# Patient Record
Sex: FEMALE | Race: WHITE | NOT HISPANIC OR LATINO | Employment: FULL TIME | URBAN - METROPOLITAN AREA
[De-identification: names, ages, dates, MRNs, and addresses within clinical notes are randomized per-mention and may not be internally consistent; named-entity substitution may affect disease eponyms.]

---

## 2017-02-03 ENCOUNTER — HOSPITAL ENCOUNTER (OUTPATIENT)
Dept: MAMMOGRAPHY | Facility: HOSPITAL | Age: 62
Discharge: HOME | End: 2017-02-03

## 2017-02-13 ENCOUNTER — HOSPITAL ENCOUNTER (OUTPATIENT)
Dept: MAMMOGRAPHY | Facility: HOSPITAL | Age: 62
Discharge: HOME | End: 2017-02-13
Attending: GENERAL PRACTICE | Admitting: GENERAL PRACTICE

## 2018-01-19 ENCOUNTER — HOSPITAL ENCOUNTER (OUTPATIENT)
Dept: MAMMOGRAPHY | Facility: HOSPITAL | Age: 63
Discharge: HOME | End: 2018-01-19

## 2018-02-09 ENCOUNTER — HOSPITAL ENCOUNTER (OUTPATIENT)
Dept: MAMMOGRAPHY | Facility: HOSPITAL | Age: 63
Discharge: HOME | End: 2018-02-09
Attending: GENERAL PRACTICE | Admitting: GENERAL PRACTICE

## 2018-04-18 ENCOUNTER — HOSPITAL ENCOUNTER (OUTPATIENT)
Dept: URGENT CARE | Facility: CLINIC | Age: 63
Discharge: HOME | End: 2018-04-18
Attending: FAMILY MEDICINE | Admitting: FAMILY MEDICINE

## 2018-08-24 ENCOUNTER — HOSPITAL ENCOUNTER (OUTPATIENT)
Dept: OTHER | Facility: HOSPITAL | Age: 63
Discharge: HOME | End: 2018-08-24
Attending: ORTHOPAEDIC SURGERY | Admitting: ORTHOPAEDIC SURGERY

## 2018-08-24 LAB
ANION GAP SERPL CALC-SCNC: 10.5 MMOL/L (ref 10–20)
BASOPHILS # BLD AUTO: 0 10*3/UL (ref 0–0.2)
BASOPHILS NFR BLD AUTO: 1 % (ref 0–2)
BUN SERPL-MCNC: 14 MG/DL (ref 8–20)
BUN/CREAT SERPL: 20 (ref 5.4–26.2)
CALCIUM SERPL-MCNC: 9.5 MG/DL (ref 8.9–10.3)
CHLORIDE SERPL-SCNC: 100 MMOL/L (ref 101–111)
CONV CO2: 29 MMOL/L (ref 22–32)
CREAT UR-MCNC: 0.7 MG/DL (ref 0.4–1)
DIFFERENTIAL METHOD BLD: (no result)
EOSINOPHIL # BLD AUTO: 0.2 10*3/UL (ref 0–0.3)
EOSINOPHIL # BLD AUTO: 2 % (ref 0–3)
ERYTHROCYTE [DISTWIDTH] IN BLOOD BY AUTOMATED COUNT: 15.2 % (ref 11.5–14.5)
GLUCOSE SERPL-MCNC: 87 MG/DL (ref 65–99)
HCT VFR BLD AUTO: 37.3 % (ref 35–49)
HGB BLD-MCNC: 12.6 G/DL (ref 12–15)
LYMPHOCYTES # BLD AUTO: 3.2 10*3/UL (ref 0.8–4.8)
LYMPHOCYTES NFR BLD AUTO: 31 % (ref 18–42)
MCH RBC QN AUTO: 29.4 PG (ref 26–32)
MCHC RBC AUTO-ENTMCNC: 33.7 G/DL (ref 32–36)
MCV RBC AUTO: 87.1 FL (ref 80–94)
MONOCYTES # BLD AUTO: 0.7 10*3/UL (ref 0.1–1.3)
MONOCYTES NFR BLD AUTO: 7 % (ref 2–11)
NEUTROPHILS # BLD AUTO: 6.1 10*3/UL (ref 2.3–8.6)
NEUTROPHILS NFR BLD AUTO: 59 % (ref 50–75)
NRBC BLD AUTO-RTO: 0 /100{WBCS}
NRBC/RBC NFR BLD MANUAL: 0 10*3/UL
PLATELET # BLD AUTO: 355 10*3/UL (ref 150–450)
PMV BLD AUTO: 7.4 FL (ref 7.4–10.4)
POTASSIUM SERPL-SCNC: 3.5 MMOL/L (ref 3.6–5.1)
RBC # BLD AUTO: 4.29 10*6/UL (ref 4–5.4)
SODIUM SERPL-SCNC: 136 MMOL/L (ref 136–144)
WBC # BLD AUTO: 10.2 10*3/UL (ref 4.5–11.5)

## 2019-01-25 ENCOUNTER — HOSPITAL ENCOUNTER (OUTPATIENT)
Dept: MAMMOGRAPHY | Facility: HOSPITAL | Age: 64
Discharge: HOME | End: 2019-01-25

## 2019-02-06 ENCOUNTER — HOSPITAL ENCOUNTER (OUTPATIENT)
Dept: MAMMOGRAPHY | Facility: HOSPITAL | Age: 64
Discharge: HOME | End: 2019-02-06
Attending: GENERAL PRACTICE | Admitting: GENERAL PRACTICE

## 2019-02-22 ENCOUNTER — HOSPITAL ENCOUNTER (OUTPATIENT)
Dept: GENERAL RADIOLOGY | Facility: HOSPITAL | Age: 64
Discharge: HOME | End: 2019-02-22
Attending: GENERAL PRACTICE | Admitting: GENERAL PRACTICE

## 2019-07-10 ENCOUNTER — ON CAMPUS - OUTPATIENT (OUTPATIENT)
Dept: RURAL HOSPITAL 3 | Facility: HOSPITAL | Age: 64
End: 2019-07-10
Payer: COMMERCIAL

## 2019-07-10 DIAGNOSIS — K52.9 NONINFECTIVE GASTROENTERITIS AND COLITIS, UNSPECIFIED: ICD-10-CM

## 2019-07-10 DIAGNOSIS — Z86.010 PERSONAL HISTORY OF COLONIC POLYPS: ICD-10-CM

## 2019-07-10 DIAGNOSIS — K64.8 OTHER HEMORRHOIDS: ICD-10-CM

## 2019-07-10 PROCEDURE — 45380 COLONOSCOPY AND BIOPSY: CPT | Performed by: INTERNAL MEDICINE

## 2020-02-21 ENCOUNTER — TRANSCRIBE ORDERS (OUTPATIENT)
Dept: ADMINISTRATIVE | Facility: HOSPITAL | Age: 65
End: 2020-02-21

## 2020-02-21 DIAGNOSIS — Z12.31 SCREENING MAMMOGRAM, ENCOUNTER FOR: Primary | ICD-10-CM

## 2020-03-02 ENCOUNTER — HOSPITAL ENCOUNTER (OUTPATIENT)
Facility: HOSPITAL | Age: 65
Discharge: HOME | End: 2020-03-05
Attending: EMERGENCY MEDICINE | Admitting: INTERNAL MEDICINE

## 2020-03-02 ENCOUNTER — APPOINTMENT (OUTPATIENT)
Dept: GENERAL RADIOLOGY | Facility: HOSPITAL | Age: 65
End: 2020-03-02

## 2020-03-02 DIAGNOSIS — E78.2 MIXED HYPERLIPIDEMIA: ICD-10-CM

## 2020-03-02 DIAGNOSIS — I10 ESSENTIAL HYPERTENSION: ICD-10-CM

## 2020-03-02 DIAGNOSIS — R94.39 ABNORMAL NUCLEAR STRESS TEST: Primary | ICD-10-CM

## 2020-03-02 DIAGNOSIS — R07.9 CHEST PAIN, UNSPECIFIED TYPE: ICD-10-CM

## 2020-03-02 PROBLEM — E78.5 HYPERLIPIDEMIA: Status: ACTIVE | Noted: 2020-03-02

## 2020-03-02 PROBLEM — E66.9 OBESITY (BMI 30-39.9): Chronic | Status: ACTIVE | Noted: 2020-03-02

## 2020-03-02 PROBLEM — M85.80 OSTEOPENIA: Chronic | Status: ACTIVE | Noted: 2020-03-02

## 2020-03-02 PROBLEM — E78.5 HYPERLIPIDEMIA: Chronic | Status: ACTIVE | Noted: 2020-03-02

## 2020-03-02 LAB
ANION GAP SERPL CALCULATED.3IONS-SCNC: 16 MMOL/L (ref 5–15)
APTT PPP: 25.2 SECONDS (ref 24–31)
BASOPHILS # BLD AUTO: 0.1 10*3/MM3 (ref 0–0.2)
BASOPHILS NFR BLD AUTO: 1 % (ref 0–1.5)
BUN BLD-MCNC: 19 MG/DL (ref 8–23)
BUN/CREAT SERPL: 26.8 (ref 7–25)
CALCIUM SPEC-SCNC: 10.4 MG/DL (ref 8.6–10.5)
CHLORIDE SERPL-SCNC: 99 MMOL/L (ref 98–107)
CO2 SERPL-SCNC: 25 MMOL/L (ref 22–29)
CREAT BLD-MCNC: 0.71 MG/DL (ref 0.57–1)
D DIMER PPP FEU-MCNC: 0.79 MCGFEU/ML (ref 0.17–0.59)
DEPRECATED RDW RBC AUTO: 46.8 FL (ref 37–54)
EOSINOPHIL # BLD AUTO: 0.2 10*3/MM3 (ref 0–0.4)
EOSINOPHIL NFR BLD AUTO: 2.1 % (ref 0.3–6.2)
ERYTHROCYTE [DISTWIDTH] IN BLOOD BY AUTOMATED COUNT: 15 % (ref 12.3–15.4)
GFR SERPL CREATININE-BSD FRML MDRD: 83 ML/MIN/1.73
GLUCOSE BLD-MCNC: 99 MG/DL (ref 65–99)
HCT VFR BLD AUTO: 38.4 % (ref 34–46.6)
HGB BLD-MCNC: 12.9 G/DL (ref 12–15.9)
INR PPP: 1.03 (ref 0.9–1.1)
LYMPHOCYTES # BLD AUTO: 4.4 10*3/MM3 (ref 0.7–3.1)
LYMPHOCYTES NFR BLD AUTO: 39.8 % (ref 19.6–45.3)
MCH RBC QN AUTO: 29.5 PG (ref 26.6–33)
MCHC RBC AUTO-ENTMCNC: 33.7 G/DL (ref 31.5–35.7)
MCV RBC AUTO: 87.6 FL (ref 79–97)
MONOCYTES # BLD AUTO: 0.6 10*3/MM3 (ref 0.1–0.9)
MONOCYTES NFR BLD AUTO: 5.6 % (ref 5–12)
NEUTROPHILS # BLD AUTO: 5.7 10*3/MM3 (ref 1.7–7)
NEUTROPHILS NFR BLD AUTO: 51.5 % (ref 42.7–76)
NRBC BLD AUTO-RTO: 0.1 /100 WBC (ref 0–0.2)
NT-PROBNP SERPL-MCNC: 39.5 PG/ML (ref 5–900)
PLATELET # BLD AUTO: 377 10*3/MM3 (ref 140–450)
PMV BLD AUTO: 6.9 FL (ref 6–12)
POTASSIUM BLD-SCNC: 3.9 MMOL/L (ref 3.5–5.2)
PROTHROMBIN TIME: 10.8 SECONDS (ref 9.6–11.7)
RBC # BLD AUTO: 4.38 10*6/MM3 (ref 3.77–5.28)
SODIUM BLD-SCNC: 140 MMOL/L (ref 136–145)
TROPONIN T SERPL-MCNC: <0.01 NG/ML (ref 0–0.03)
WBC NRBC COR # BLD: 11.1 10*3/MM3 (ref 3.4–10.8)

## 2020-03-02 PROCEDURE — 80048 BASIC METABOLIC PNL TOTAL CA: CPT | Performed by: EMERGENCY MEDICINE

## 2020-03-02 PROCEDURE — 71045 X-RAY EXAM CHEST 1 VIEW: CPT

## 2020-03-02 PROCEDURE — 85730 THROMBOPLASTIN TIME PARTIAL: CPT | Performed by: EMERGENCY MEDICINE

## 2020-03-02 PROCEDURE — G0378 HOSPITAL OBSERVATION PER HR: HCPCS

## 2020-03-02 PROCEDURE — 85025 COMPLETE CBC W/AUTO DIFF WBC: CPT | Performed by: EMERGENCY MEDICINE

## 2020-03-02 PROCEDURE — 99284 EMERGENCY DEPT VISIT MOD MDM: CPT

## 2020-03-02 PROCEDURE — 83880 ASSAY OF NATRIURETIC PEPTIDE: CPT | Performed by: PHYSICIAN ASSISTANT

## 2020-03-02 PROCEDURE — 85610 PROTHROMBIN TIME: CPT | Performed by: EMERGENCY MEDICINE

## 2020-03-02 PROCEDURE — 93005 ELECTROCARDIOGRAM TRACING: CPT

## 2020-03-02 PROCEDURE — 93005 ELECTROCARDIOGRAM TRACING: CPT | Performed by: EMERGENCY MEDICINE

## 2020-03-02 PROCEDURE — 99219 PR INITIAL OBSERVATION CARE/DAY 50 MINUTES: CPT | Performed by: INTERNAL MEDICINE

## 2020-03-02 PROCEDURE — 85379 FIBRIN DEGRADATION QUANT: CPT | Performed by: PHYSICIAN ASSISTANT

## 2020-03-02 PROCEDURE — 84484 ASSAY OF TROPONIN QUANT: CPT | Performed by: EMERGENCY MEDICINE

## 2020-03-02 RX ORDER — HYDROCHLOROTHIAZIDE 50 MG/1
50 TABLET ORAL DAILY
COMMUNITY
End: 2020-03-05 | Stop reason: HOSPADM

## 2020-03-02 RX ORDER — SODIUM CHLORIDE 0.9 % (FLUSH) 0.9 %
10 SYRINGE (ML) INJECTION AS NEEDED
Status: DISCONTINUED | OUTPATIENT
Start: 2020-03-02 | End: 2020-03-05 | Stop reason: HOSPADM

## 2020-03-02 RX ORDER — ONDANSETRON 2 MG/ML
4 INJECTION INTRAMUSCULAR; INTRAVENOUS EVERY 6 HOURS PRN
Status: DISCONTINUED | OUTPATIENT
Start: 2020-03-02 | End: 2020-03-05 | Stop reason: HOSPADM

## 2020-03-02 RX ORDER — ACETAMINOPHEN 325 MG/1
650 TABLET ORAL EVERY 4 HOURS PRN
Status: DISCONTINUED | OUTPATIENT
Start: 2020-03-02 | End: 2020-03-05 | Stop reason: HOSPADM

## 2020-03-02 RX ORDER — POTASSIUM CHLORIDE 600 MG/1
20 CAPSULE, EXTENDED RELEASE ORAL DAILY
COMMUNITY
End: 2023-01-28

## 2020-03-02 RX ORDER — ACETAMINOPHEN 650 MG/1
650 SUPPOSITORY RECTAL EVERY 4 HOURS PRN
Status: DISCONTINUED | OUTPATIENT
Start: 2020-03-02 | End: 2020-03-05 | Stop reason: HOSPADM

## 2020-03-02 RX ORDER — POTASSIUM CHLORIDE 20 MEQ/1
20 TABLET, EXTENDED RELEASE ORAL ONCE
Status: DISCONTINUED | OUTPATIENT
Start: 2020-03-02 | End: 2020-03-02

## 2020-03-02 RX ORDER — ACETAMINOPHEN 160 MG/5ML
650 SOLUTION ORAL EVERY 4 HOURS PRN
Status: DISCONTINUED | OUTPATIENT
Start: 2020-03-02 | End: 2020-03-05 | Stop reason: HOSPADM

## 2020-03-02 RX ORDER — BISACODYL 10 MG
10 SUPPOSITORY, RECTAL RECTAL DAILY PRN
Status: DISCONTINUED | OUTPATIENT
Start: 2020-03-02 | End: 2020-03-05 | Stop reason: HOSPADM

## 2020-03-02 RX ORDER — CHOLECALCIFEROL (VITAMIN D3) 125 MCG
5 CAPSULE ORAL NIGHTLY PRN
Status: DISCONTINUED | OUTPATIENT
Start: 2020-03-02 | End: 2020-03-05 | Stop reason: HOSPADM

## 2020-03-02 RX ORDER — POTASSIUM CHLORIDE 20 MEQ/1
40 TABLET, EXTENDED RELEASE ORAL AS NEEDED
Status: DISCONTINUED | OUTPATIENT
Start: 2020-03-02 | End: 2020-03-05 | Stop reason: HOSPADM

## 2020-03-02 RX ORDER — HYDRALAZINE HYDROCHLORIDE 25 MG/1
25 TABLET, FILM COATED ORAL 2 TIMES DAILY
Status: DISCONTINUED | OUTPATIENT
Start: 2020-03-02 | End: 2020-03-05 | Stop reason: HOSPADM

## 2020-03-02 RX ORDER — IBUPROFEN 400 MG/1
800 TABLET ORAL
Status: DISCONTINUED | OUTPATIENT
Start: 2020-03-03 | End: 2020-03-03

## 2020-03-02 RX ORDER — NITROGLYCERIN 0.4 MG/1
0.4 TABLET SUBLINGUAL
Status: DISCONTINUED | OUTPATIENT
Start: 2020-03-02 | End: 2020-03-05 | Stop reason: HOSPADM

## 2020-03-02 RX ORDER — ALUMINA, MAGNESIA, AND SIMETHICONE 2400; 2400; 240 MG/30ML; MG/30ML; MG/30ML
15 SUSPENSION ORAL EVERY 6 HOURS PRN
Status: DISCONTINUED | OUTPATIENT
Start: 2020-03-02 | End: 2020-03-05 | Stop reason: HOSPADM

## 2020-03-02 RX ORDER — MAGNESIUM SULFATE HEPTAHYDRATE 40 MG/ML
2 INJECTION, SOLUTION INTRAVENOUS AS NEEDED
Status: DISCONTINUED | OUTPATIENT
Start: 2020-03-02 | End: 2020-03-05 | Stop reason: HOSPADM

## 2020-03-02 RX ORDER — MAGNESIUM SULFATE 1 G/100ML
1 INJECTION INTRAVENOUS AS NEEDED
Status: DISCONTINUED | OUTPATIENT
Start: 2020-03-02 | End: 2020-03-05 | Stop reason: HOSPADM

## 2020-03-02 RX ORDER — HYDROCHLOROTHIAZIDE 25 MG/1
50 TABLET ORAL DAILY
Status: DISCONTINUED | OUTPATIENT
Start: 2020-03-03 | End: 2020-03-05 | Stop reason: HOSPADM

## 2020-03-02 RX ORDER — ONDANSETRON 4 MG/1
4 TABLET, FILM COATED ORAL EVERY 6 HOURS PRN
Status: DISCONTINUED | OUTPATIENT
Start: 2020-03-02 | End: 2020-03-05 | Stop reason: HOSPADM

## 2020-03-02 RX ORDER — ASPIRIN 325 MG
325 TABLET ORAL ONCE
Status: COMPLETED | OUTPATIENT
Start: 2020-03-02 | End: 2020-03-02

## 2020-03-02 RX ORDER — PHENOL 1.4 %
600 AEROSOL, SPRAY (ML) MUCOUS MEMBRANE DAILY
COMMUNITY

## 2020-03-02 RX ORDER — SODIUM CHLORIDE 0.9 % (FLUSH) 0.9 %
10 SYRINGE (ML) INJECTION EVERY 12 HOURS SCHEDULED
Status: DISCONTINUED | OUTPATIENT
Start: 2020-03-02 | End: 2020-03-05 | Stop reason: HOSPADM

## 2020-03-02 RX ORDER — HYDRALAZINE HYDROCHLORIDE 25 MG/1
25 TABLET, FILM COATED ORAL 2 TIMES DAILY
COMMUNITY
End: 2023-01-28 | Stop reason: SDUPTHER

## 2020-03-02 RX ADMIN — ASPIRIN 325 MG ORAL TABLET 325 MG: 325 PILL ORAL at 18:43

## 2020-03-02 RX ADMIN — Medication 10 ML: at 22:08

## 2020-03-02 RX ADMIN — Medication 10 ML: at 18:44

## 2020-03-03 ENCOUNTER — APPOINTMENT (OUTPATIENT)
Dept: NUCLEAR MEDICINE | Facility: HOSPITAL | Age: 65
End: 2020-03-03

## 2020-03-03 ENCOUNTER — APPOINTMENT (OUTPATIENT)
Dept: CT IMAGING | Facility: HOSPITAL | Age: 65
End: 2020-03-03

## 2020-03-03 LAB
ANION GAP SERPL CALCULATED.3IONS-SCNC: 13 MMOL/L (ref 5–15)
BASOPHILS # BLD AUTO: 0 10*3/MM3 (ref 0–0.2)
BASOPHILS NFR BLD AUTO: 0.3 % (ref 0–1.5)
BH CV NUCLEAR PRIOR STUDY: 3
BH CV STRESS BP STAGE 1: NORMAL
BH CV STRESS BP STAGE 2: NORMAL
BH CV STRESS BP STAGE 3: NORMAL
BH CV STRESS COMMENTS STAGE 1: NORMAL
BH CV STRESS COMMENTS STAGE 2: NORMAL
BH CV STRESS DOSE REGADENOSON STAGE 1: 0.4
BH CV STRESS DURATION MIN STAGE 1: 0
BH CV STRESS DURATION MIN STAGE 2: 4
BH CV STRESS DURATION SEC STAGE 1: 10
BH CV STRESS DURATION SEC STAGE 2: 0
BH CV STRESS HR STAGE 1: 83
BH CV STRESS HR STAGE 2: 91
BH CV STRESS HR STAGE 3: 90
BH CV STRESS PROTOCOL 1: NORMAL
BH CV STRESS RECOVERY BP: NORMAL MMHG
BH CV STRESS RECOVERY HR: 82 BPM
BH CV STRESS STAGE 1: 1
BH CV STRESS STAGE 2: 2
BH CV STRESS STAGE 3: 3
BUN BLD-MCNC: 17 MG/DL (ref 8–23)
BUN/CREAT SERPL: 27 (ref 7–25)
CALCIUM SPEC-SCNC: 9.5 MG/DL (ref 8.6–10.5)
CHLORIDE SERPL-SCNC: 97 MMOL/L (ref 98–107)
CHOLEST SERPL-MCNC: 165 MG/DL (ref 0–200)
CO2 SERPL-SCNC: 24 MMOL/L (ref 22–29)
CREAT BLD-MCNC: 0.63 MG/DL (ref 0.57–1)
DEPRECATED RDW RBC AUTO: 46.8 FL (ref 37–54)
EOSINOPHIL # BLD AUTO: 0.2 10*3/MM3 (ref 0–0.4)
EOSINOPHIL NFR BLD AUTO: 2.2 % (ref 0.3–6.2)
ERYTHROCYTE [DISTWIDTH] IN BLOOD BY AUTOMATED COUNT: 15.3 % (ref 12.3–15.4)
GFR SERPL CREATININE-BSD FRML MDRD: 95 ML/MIN/1.73
GLUCOSE BLD-MCNC: 132 MG/DL (ref 65–99)
HCT VFR BLD AUTO: 36.6 % (ref 34–46.6)
HDLC SERPL-MCNC: 34 MG/DL (ref 40–60)
HGB BLD-MCNC: 12.5 G/DL (ref 12–15.9)
LDLC SERPL CALC-MCNC: 95 MG/DL (ref 0–100)
LDLC/HDLC SERPL: 2.8 {RATIO}
LYMPHOCYTES # BLD AUTO: 3.3 10*3/MM3 (ref 0.7–3.1)
LYMPHOCYTES NFR BLD AUTO: 34.5 % (ref 19.6–45.3)
MAGNESIUM SERPL-MCNC: 1.5 MG/DL (ref 1.6–2.4)
MAXIMAL PREDICTED HEART RATE: 157 BPM
MCH RBC QN AUTO: 29.9 PG (ref 26.6–33)
MCHC RBC AUTO-ENTMCNC: 34.3 G/DL (ref 31.5–35.7)
MCV RBC AUTO: 87.3 FL (ref 79–97)
MONOCYTES # BLD AUTO: 0.7 10*3/MM3 (ref 0.1–0.9)
MONOCYTES NFR BLD AUTO: 6.9 % (ref 5–12)
NEUTROPHILS # BLD AUTO: 5.3 10*3/MM3 (ref 1.7–7)
NEUTROPHILS NFR BLD AUTO: 56.1 % (ref 42.7–76)
NRBC BLD AUTO-RTO: 0.1 /100 WBC (ref 0–0.2)
PERCENT MAX PREDICTED HR: 57.32 %
PLATELET # BLD AUTO: 335 10*3/MM3 (ref 140–450)
PMV BLD AUTO: 6.8 FL (ref 6–12)
POTASSIUM BLD-SCNC: 3.8 MMOL/L (ref 3.5–5.2)
RBC # BLD AUTO: 4.19 10*6/MM3 (ref 3.77–5.28)
SODIUM BLD-SCNC: 134 MMOL/L (ref 136–145)
STRESS BASELINE BP: NORMAL MMHG
STRESS BASELINE HR: 64 BPM
STRESS PERCENT HR: 67 %
STRESS POST PEAK BP: NORMAL MMHG
STRESS POST PEAK HR: 90 BPM
STRESS TARGET HR: 133 BPM
TRIGL SERPL-MCNC: 179 MG/DL (ref 0–150)
TROPONIN T SERPL-MCNC: <0.01 NG/ML (ref 0–0.03)
TROPONIN T SERPL-MCNC: <0.01 NG/ML (ref 0–0.03)
VLDLC SERPL-MCNC: 35.8 MG/DL
WBC NRBC COR # BLD: 9.5 10*3/MM3 (ref 3.4–10.8)

## 2020-03-03 PROCEDURE — 83735 ASSAY OF MAGNESIUM: CPT | Performed by: PHYSICIAN ASSISTANT

## 2020-03-03 PROCEDURE — G0378 HOSPITAL OBSERVATION PER HR: HCPCS

## 2020-03-03 PROCEDURE — 71275 CT ANGIOGRAPHY CHEST: CPT

## 2020-03-03 PROCEDURE — 25010000002 REGADENOSON 0.4 MG/5ML SOLUTION: Performed by: FAMILY MEDICINE

## 2020-03-03 PROCEDURE — 99244 OFF/OP CNSLTJ NEW/EST MOD 40: CPT | Performed by: INTERNAL MEDICINE

## 2020-03-03 PROCEDURE — 25010000002 MAGNESIUM SULFATE 2 GM/50ML SOLUTION: Performed by: PHYSICIAN ASSISTANT

## 2020-03-03 PROCEDURE — 93016 CV STRESS TEST SUPVJ ONLY: CPT | Performed by: NURSE PRACTITIONER

## 2020-03-03 PROCEDURE — 85025 COMPLETE CBC W/AUTO DIFF WBC: CPT | Performed by: PHYSICIAN ASSISTANT

## 2020-03-03 PROCEDURE — 80048 BASIC METABOLIC PNL TOTAL CA: CPT | Performed by: PHYSICIAN ASSISTANT

## 2020-03-03 PROCEDURE — A9500 TC99M SESTAMIBI: HCPCS | Performed by: FAMILY MEDICINE

## 2020-03-03 PROCEDURE — 78452 HT MUSCLE IMAGE SPECT MULT: CPT

## 2020-03-03 PROCEDURE — 96365 THER/PROPH/DIAG IV INF INIT: CPT

## 2020-03-03 PROCEDURE — 78452 HT MUSCLE IMAGE SPECT MULT: CPT | Performed by: INTERNAL MEDICINE

## 2020-03-03 PROCEDURE — 0 TECHNETIUM SESTAMIBI: Performed by: FAMILY MEDICINE

## 2020-03-03 PROCEDURE — 84484 ASSAY OF TROPONIN QUANT: CPT | Performed by: PHYSICIAN ASSISTANT

## 2020-03-03 PROCEDURE — 93017 CV STRESS TEST TRACING ONLY: CPT

## 2020-03-03 PROCEDURE — 99225 PR SBSQ OBSERVATION CARE/DAY 25 MINUTES: CPT | Performed by: FAMILY MEDICINE

## 2020-03-03 PROCEDURE — 25010000002 MAGNESIUM SULFATE 2 GM/50ML SOLUTION: Performed by: INTERNAL MEDICINE

## 2020-03-03 PROCEDURE — 93018 CV STRESS TEST I&R ONLY: CPT | Performed by: INTERNAL MEDICINE

## 2020-03-03 PROCEDURE — 96366 THER/PROPH/DIAG IV INF ADDON: CPT

## 2020-03-03 PROCEDURE — 80061 LIPID PANEL: CPT | Performed by: FAMILY MEDICINE

## 2020-03-03 PROCEDURE — 0 IOPAMIDOL PER 1 ML: Performed by: INTERNAL MEDICINE

## 2020-03-03 RX ORDER — SODIUM CHLORIDE 0.9 % (FLUSH) 0.9 %
3 SYRINGE (ML) INJECTION EVERY 12 HOURS SCHEDULED
Status: CANCELLED | OUTPATIENT
Start: 2020-03-03

## 2020-03-03 RX ORDER — ATORVASTATIN CALCIUM 40 MG/1
40 TABLET, FILM COATED ORAL NIGHTLY
Status: DISCONTINUED | OUTPATIENT
Start: 2020-03-03 | End: 2020-03-05 | Stop reason: HOSPADM

## 2020-03-03 RX ORDER — SODIUM CHLORIDE 0.9 % (FLUSH) 0.9 %
10 SYRINGE (ML) INJECTION AS NEEDED
Status: CANCELLED | OUTPATIENT
Start: 2020-03-03

## 2020-03-03 RX ORDER — MAGNESIUM SULFATE HEPTAHYDRATE 40 MG/ML
2 INJECTION, SOLUTION INTRAVENOUS ONCE
Status: COMPLETED | OUTPATIENT
Start: 2020-03-03 | End: 2020-03-03

## 2020-03-03 RX ORDER — ASPIRIN 81 MG/1
81 TABLET, CHEWABLE ORAL DAILY
Status: DISCONTINUED | OUTPATIENT
Start: 2020-03-03 | End: 2020-03-05 | Stop reason: HOSPADM

## 2020-03-03 RX ADMIN — ASPIRIN 81 MG 81 MG: 81 TABLET ORAL at 18:39

## 2020-03-03 RX ADMIN — Medication 10 ML: at 22:00

## 2020-03-03 RX ADMIN — Medication 10 ML: at 22:22

## 2020-03-03 RX ADMIN — TECHNETIUM TC 99M SESTAMIBI 1 DOSE: 1 INJECTION INTRAVENOUS at 10:55

## 2020-03-03 RX ADMIN — IOPAMIDOL 100 ML: 755 INJECTION, SOLUTION INTRAVENOUS at 04:30

## 2020-03-03 RX ADMIN — Medication 10 ML: at 08:39

## 2020-03-03 RX ADMIN — MAGNESIUM SULFATE HEPTAHYDRATE 2 G: 40 INJECTION, SOLUTION INTRAVENOUS at 22:42

## 2020-03-03 RX ADMIN — HYDRALAZINE HYDROCHLORIDE 25 MG: 25 TABLET, FILM COATED ORAL at 22:00

## 2020-03-03 RX ADMIN — TECHNETIUM TC 99M SESTAMIBI 1 DOSE: 1 INJECTION INTRAVENOUS at 09:20

## 2020-03-03 RX ADMIN — ATORVASTATIN CALCIUM 40 MG: 40 TABLET, FILM COATED ORAL at 22:00

## 2020-03-03 RX ADMIN — REGADENOSON 0.4 MG: 0.08 INJECTION, SOLUTION INTRAVENOUS at 10:55

## 2020-03-03 RX ADMIN — ACETAMINOPHEN 650 MG: 325 TABLET, FILM COATED ORAL at 18:39

## 2020-03-03 RX ADMIN — HYDROCHLOROTHIAZIDE 50 MG: 25 TABLET ORAL at 11:58

## 2020-03-03 RX ADMIN — MAGNESIUM SULFATE HEPTAHYDRATE 2 G: 40 INJECTION, SOLUTION INTRAVENOUS at 08:38

## 2020-03-03 RX ADMIN — HYDRALAZINE HYDROCHLORIDE 25 MG: 25 TABLET, FILM COATED ORAL at 11:58

## 2020-03-03 RX ADMIN — IBUPROFEN 800 MG: 400 TABLET ORAL at 11:58

## 2020-03-04 PROBLEM — E78.2 MIXED HYPERLIPIDEMIA: Status: ACTIVE | Noted: 2020-03-02

## 2020-03-04 PROBLEM — R94.39 ABNORMAL NUCLEAR STRESS TEST: Status: ACTIVE | Noted: 2020-03-02

## 2020-03-04 PROBLEM — I10 ESSENTIAL HYPERTENSION: Status: ACTIVE | Noted: 2020-03-02

## 2020-03-04 LAB
ANION GAP SERPL CALCULATED.3IONS-SCNC: 15 MMOL/L (ref 5–15)
BASOPHILS # BLD AUTO: 0.1 10*3/MM3 (ref 0–0.2)
BASOPHILS NFR BLD AUTO: 0.9 % (ref 0–1.5)
BUN BLD-MCNC: 15 MG/DL (ref 8–23)
BUN/CREAT SERPL: 21.7 (ref 7–25)
CALCIUM SPEC-SCNC: 9.5 MG/DL (ref 8.6–10.5)
CHLORIDE SERPL-SCNC: 99 MMOL/L (ref 98–107)
CO2 SERPL-SCNC: 25 MMOL/L (ref 22–29)
CREAT BLD-MCNC: 0.69 MG/DL (ref 0.57–1)
DEPRECATED RDW RBC AUTO: 47.3 FL (ref 37–54)
EOSINOPHIL # BLD AUTO: 0.2 10*3/MM3 (ref 0–0.4)
EOSINOPHIL NFR BLD AUTO: 2.4 % (ref 0.3–6.2)
ERYTHROCYTE [DISTWIDTH] IN BLOOD BY AUTOMATED COUNT: 15.2 % (ref 12.3–15.4)
GFR SERPL CREATININE-BSD FRML MDRD: 86 ML/MIN/1.73
GLUCOSE BLD-MCNC: 141 MG/DL (ref 65–99)
HBA1C MFR BLD: 6.2 % (ref 3.5–5.6)
HCT VFR BLD AUTO: 37.1 % (ref 34–46.6)
HGB BLD-MCNC: 12.6 G/DL (ref 12–15.9)
INR PPP: 1.06 (ref 0.9–1.1)
LYMPHOCYTES # BLD AUTO: 3.6 10*3/MM3 (ref 0.7–3.1)
LYMPHOCYTES NFR BLD AUTO: 35 % (ref 19.6–45.3)
MAGNESIUM SERPL-MCNC: 2.1 MG/DL (ref 1.6–2.4)
MCH RBC QN AUTO: 30 PG (ref 26.6–33)
MCHC RBC AUTO-ENTMCNC: 33.9 G/DL (ref 31.5–35.7)
MCV RBC AUTO: 88.3 FL (ref 79–97)
MONOCYTES # BLD AUTO: 0.6 10*3/MM3 (ref 0.1–0.9)
MONOCYTES NFR BLD AUTO: 6.3 % (ref 5–12)
NEUTROPHILS # BLD AUTO: 5.7 10*3/MM3 (ref 1.7–7)
NEUTROPHILS NFR BLD AUTO: 55.4 % (ref 42.7–76)
NRBC BLD AUTO-RTO: 0.1 /100 WBC (ref 0–0.2)
PLATELET # BLD AUTO: 356 10*3/MM3 (ref 140–450)
PMV BLD AUTO: 7.2 FL (ref 6–12)
POTASSIUM BLD-SCNC: 3.7 MMOL/L (ref 3.5–5.2)
PROTHROMBIN TIME: 11 SECONDS (ref 9.6–11.7)
RBC # BLD AUTO: 4.21 10*6/MM3 (ref 3.77–5.28)
SODIUM BLD-SCNC: 139 MMOL/L (ref 136–145)
TROPONIN T SERPL-MCNC: <0.01 NG/ML (ref 0–0.03)
TSH SERPL DL<=0.05 MIU/L-ACNC: 3.39 UIU/ML (ref 0.27–4.2)
WBC NRBC COR # BLD: 10.3 10*3/MM3 (ref 3.4–10.8)

## 2020-03-04 PROCEDURE — 83036 HEMOGLOBIN GLYCOSYLATED A1C: CPT | Performed by: FAMILY MEDICINE

## 2020-03-04 PROCEDURE — G0378 HOSPITAL OBSERVATION PER HR: HCPCS

## 2020-03-04 PROCEDURE — 85025 COMPLETE CBC W/AUTO DIFF WBC: CPT | Performed by: INTERNAL MEDICINE

## 2020-03-04 PROCEDURE — 0 IOPAMIDOL PER 1 ML: Performed by: INTERNAL MEDICINE

## 2020-03-04 PROCEDURE — 99152 MOD SED SAME PHYS/QHP 5/>YRS: CPT | Performed by: INTERNAL MEDICINE

## 2020-03-04 PROCEDURE — 93458 L HRT ARTERY/VENTRICLE ANGIO: CPT | Performed by: INTERNAL MEDICINE

## 2020-03-04 PROCEDURE — 84484 ASSAY OF TROPONIN QUANT: CPT | Performed by: INTERNAL MEDICINE

## 2020-03-04 PROCEDURE — 99153 MOD SED SAME PHYS/QHP EA: CPT | Performed by: INTERNAL MEDICINE

## 2020-03-04 PROCEDURE — 83735 ASSAY OF MAGNESIUM: CPT | Performed by: INTERNAL MEDICINE

## 2020-03-04 PROCEDURE — 99225 PR SBSQ OBSERVATION CARE/DAY 25 MINUTES: CPT | Performed by: FAMILY MEDICINE

## 2020-03-04 PROCEDURE — 84443 ASSAY THYROID STIM HORMONE: CPT | Performed by: INTERNAL MEDICINE

## 2020-03-04 PROCEDURE — 93005 ELECTROCARDIOGRAM TRACING: CPT | Performed by: FAMILY MEDICINE

## 2020-03-04 PROCEDURE — C1769 GUIDE WIRE: HCPCS | Performed by: INTERNAL MEDICINE

## 2020-03-04 PROCEDURE — 80048 BASIC METABOLIC PNL TOTAL CA: CPT | Performed by: INTERNAL MEDICINE

## 2020-03-04 PROCEDURE — 99214 OFFICE O/P EST MOD 30 MIN: CPT | Performed by: INTERNAL MEDICINE

## 2020-03-04 PROCEDURE — 85610 PROTHROMBIN TIME: CPT | Performed by: INTERNAL MEDICINE

## 2020-03-04 PROCEDURE — 93010 ELECTROCARDIOGRAM REPORT: CPT | Performed by: INTERNAL MEDICINE

## 2020-03-04 PROCEDURE — 25010000002 FENTANYL CITRATE (PF) 100 MCG/2ML SOLUTION: Performed by: INTERNAL MEDICINE

## 2020-03-04 PROCEDURE — 25010000002 MIDAZOLAM PER 1 MG: Performed by: INTERNAL MEDICINE

## 2020-03-04 RX ORDER — ONDANSETRON 4 MG/1
4 TABLET, FILM COATED ORAL EVERY 6 HOURS PRN
Status: DISCONTINUED | OUTPATIENT
Start: 2020-03-04 | End: 2020-03-05 | Stop reason: HOSPADM

## 2020-03-04 RX ORDER — MIDAZOLAM HYDROCHLORIDE 1 MG/ML
INJECTION INTRAMUSCULAR; INTRAVENOUS AS NEEDED
Status: DISCONTINUED | OUTPATIENT
Start: 2020-03-04 | End: 2020-03-04 | Stop reason: HOSPADM

## 2020-03-04 RX ORDER — LIDOCAINE HYDROCHLORIDE 20 MG/ML
INJECTION, SOLUTION INFILTRATION; PERINEURAL AS NEEDED
Status: DISCONTINUED | OUTPATIENT
Start: 2020-03-04 | End: 2020-03-04 | Stop reason: HOSPADM

## 2020-03-04 RX ORDER — BISACODYL 10 MG
10 SUPPOSITORY, RECTAL RECTAL DAILY PRN
Status: DISCONTINUED | OUTPATIENT
Start: 2020-03-04 | End: 2020-03-05 | Stop reason: HOSPADM

## 2020-03-04 RX ORDER — ATROPINE SULFATE 1 MG/ML
.5-1 INJECTION, SOLUTION INTRAMUSCULAR; INTRAVENOUS; SUBCUTANEOUS
Status: DISCONTINUED | OUTPATIENT
Start: 2020-03-04 | End: 2020-03-05 | Stop reason: HOSPADM

## 2020-03-04 RX ORDER — AMOXICILLIN 250 MG
2 CAPSULE ORAL 2 TIMES DAILY
Status: DISCONTINUED | OUTPATIENT
Start: 2020-03-04 | End: 2020-03-05 | Stop reason: HOSPADM

## 2020-03-04 RX ORDER — SODIUM CHLORIDE 9 MG/ML
250 INJECTION, SOLUTION INTRAVENOUS ONCE AS NEEDED
Status: DISCONTINUED | OUTPATIENT
Start: 2020-03-04 | End: 2020-03-05 | Stop reason: HOSPADM

## 2020-03-04 RX ORDER — BISACODYL 5 MG/1
5 TABLET, DELAYED RELEASE ORAL DAILY PRN
Status: DISCONTINUED | OUTPATIENT
Start: 2020-03-04 | End: 2020-03-05 | Stop reason: HOSPADM

## 2020-03-04 RX ORDER — ACETAMINOPHEN 325 MG/1
650 TABLET ORAL EVERY 4 HOURS PRN
Status: DISCONTINUED | OUTPATIENT
Start: 2020-03-04 | End: 2020-03-05 | Stop reason: HOSPADM

## 2020-03-04 RX ORDER — DIPHENHYDRAMINE HCL 25 MG
25 TABLET ORAL EVERY 6 HOURS PRN
Status: DISCONTINUED | OUTPATIENT
Start: 2020-03-04 | End: 2020-03-05 | Stop reason: HOSPADM

## 2020-03-04 RX ORDER — FENTANYL CITRATE 50 UG/ML
INJECTION, SOLUTION INTRAMUSCULAR; INTRAVENOUS AS NEEDED
Status: DISCONTINUED | OUTPATIENT
Start: 2020-03-04 | End: 2020-03-04 | Stop reason: HOSPADM

## 2020-03-04 RX ORDER — ONDANSETRON 2 MG/ML
4 INJECTION INTRAMUSCULAR; INTRAVENOUS EVERY 6 HOURS PRN
Status: DISCONTINUED | OUTPATIENT
Start: 2020-03-04 | End: 2020-03-05 | Stop reason: HOSPADM

## 2020-03-04 RX ADMIN — ASPIRIN 81 MG 81 MG: 81 TABLET ORAL at 09:24

## 2020-03-04 RX ADMIN — SENNOSIDES,DOCUSATE SODIUM 2 TABLET: 8.6; 5 TABLET, FILM COATED ORAL at 22:28

## 2020-03-04 RX ADMIN — Medication 10 ML: at 22:32

## 2020-03-04 RX ADMIN — ATORVASTATIN CALCIUM 40 MG: 40 TABLET, FILM COATED ORAL at 22:31

## 2020-03-04 RX ADMIN — HYDRALAZINE HYDROCHLORIDE 25 MG: 25 TABLET, FILM COATED ORAL at 09:24

## 2020-03-04 RX ADMIN — HYDRALAZINE HYDROCHLORIDE 25 MG: 25 TABLET, FILM COATED ORAL at 22:29

## 2020-03-04 RX ADMIN — Medication 10 ML: at 09:25

## 2020-03-04 RX ADMIN — HYDROCHLOROTHIAZIDE 50 MG: 25 TABLET ORAL at 09:23

## 2020-03-05 VITALS
WEIGHT: 226.41 LBS | SYSTOLIC BLOOD PRESSURE: 145 MMHG | DIASTOLIC BLOOD PRESSURE: 81 MMHG | HEART RATE: 77 BPM | HEIGHT: 63 IN | RESPIRATION RATE: 16 BRPM | BODY MASS INDEX: 40.12 KG/M2 | OXYGEN SATURATION: 97 % | TEMPERATURE: 97.8 F

## 2020-03-05 LAB
ANION GAP SERPL CALCULATED.3IONS-SCNC: 14 MMOL/L (ref 5–15)
BASOPHILS # BLD AUTO: 0 10*3/MM3 (ref 0–0.2)
BASOPHILS NFR BLD AUTO: 0.3 % (ref 0–1.5)
BUN BLD-MCNC: 18 MG/DL (ref 8–23)
BUN/CREAT SERPL: 21.2 (ref 7–25)
CALCIUM SPEC-SCNC: 9.7 MG/DL (ref 8.6–10.5)
CHLORIDE SERPL-SCNC: 97 MMOL/L (ref 98–107)
CO2 SERPL-SCNC: 27 MMOL/L (ref 22–29)
CREAT BLD-MCNC: 0.85 MG/DL (ref 0.57–1)
DEPRECATED RDW RBC AUTO: 45.1 FL (ref 37–54)
EOSINOPHIL # BLD AUTO: 0.2 10*3/MM3 (ref 0–0.4)
EOSINOPHIL NFR BLD AUTO: 1.8 % (ref 0.3–6.2)
ERYTHROCYTE [DISTWIDTH] IN BLOOD BY AUTOMATED COUNT: 14.6 % (ref 12.3–15.4)
GFR SERPL CREATININE-BSD FRML MDRD: 68 ML/MIN/1.73
GLUCOSE BLD-MCNC: 170 MG/DL (ref 65–99)
HCT VFR BLD AUTO: 35.7 % (ref 34–46.6)
HGB BLD-MCNC: 12.3 G/DL (ref 12–15.9)
LYMPHOCYTES # BLD AUTO: 2.9 10*3/MM3 (ref 0.7–3.1)
LYMPHOCYTES NFR BLD AUTO: 28.6 % (ref 19.6–45.3)
MAGNESIUM SERPL-MCNC: 1.8 MG/DL (ref 1.6–2.4)
MCH RBC QN AUTO: 30 PG (ref 26.6–33)
MCHC RBC AUTO-ENTMCNC: 34.5 G/DL (ref 31.5–35.7)
MCV RBC AUTO: 87 FL (ref 79–97)
MONOCYTES # BLD AUTO: 0.7 10*3/MM3 (ref 0.1–0.9)
MONOCYTES NFR BLD AUTO: 6.6 % (ref 5–12)
NEUTROPHILS # BLD AUTO: 6.3 10*3/MM3 (ref 1.7–7)
NEUTROPHILS NFR BLD AUTO: 62.7 % (ref 42.7–76)
NRBC BLD AUTO-RTO: 0.2 /100 WBC (ref 0–0.2)
PLATELET # BLD AUTO: 321 10*3/MM3 (ref 140–450)
PMV BLD AUTO: 7 FL (ref 6–12)
POTASSIUM BLD-SCNC: 3 MMOL/L (ref 3.5–5.2)
RBC # BLD AUTO: 4.1 10*6/MM3 (ref 3.77–5.28)
SODIUM BLD-SCNC: 138 MMOL/L (ref 136–145)
WBC NRBC COR # BLD: 10.1 10*3/MM3 (ref 3.4–10.8)

## 2020-03-05 PROCEDURE — 83735 ASSAY OF MAGNESIUM: CPT | Performed by: INTERNAL MEDICINE

## 2020-03-05 PROCEDURE — 85025 COMPLETE CBC W/AUTO DIFF WBC: CPT | Performed by: INTERNAL MEDICINE

## 2020-03-05 PROCEDURE — 99217 PR OBSERVATION CARE DISCHARGE MANAGEMENT: CPT | Performed by: FAMILY MEDICINE

## 2020-03-05 PROCEDURE — G0378 HOSPITAL OBSERVATION PER HR: HCPCS

## 2020-03-05 PROCEDURE — 80048 BASIC METABOLIC PNL TOTAL CA: CPT | Performed by: INTERNAL MEDICINE

## 2020-03-05 PROCEDURE — 99213 OFFICE O/P EST LOW 20 MIN: CPT | Performed by: INTERNAL MEDICINE

## 2020-03-05 RX ORDER — LISINOPRIL 10 MG/1
10 TABLET ORAL DAILY
Qty: 30 TABLET | Refills: 0 | Status: SHIPPED | OUTPATIENT
Start: 2020-03-05 | End: 2020-04-04

## 2020-03-05 RX ORDER — ATORVASTATIN CALCIUM 40 MG/1
40 TABLET, FILM COATED ORAL NIGHTLY
Qty: 30 TABLET | Refills: 0 | Status: SHIPPED | OUTPATIENT
Start: 2020-03-05 | End: 2020-04-04

## 2020-03-05 RX ORDER — POTASSIUM CHLORIDE 1.5 G/1.77G
40 POWDER, FOR SOLUTION ORAL ONCE
Status: COMPLETED | OUTPATIENT
Start: 2020-03-05 | End: 2020-03-05

## 2020-03-05 RX ORDER — ASPIRIN 81 MG/1
81 TABLET, CHEWABLE ORAL DAILY
Qty: 30 TABLET | Refills: 0 | Status: SHIPPED | OUTPATIENT
Start: 2020-03-06 | End: 2020-04-05

## 2020-03-05 RX ADMIN — HYDRALAZINE HYDROCHLORIDE 25 MG: 25 TABLET, FILM COATED ORAL at 08:16

## 2020-03-05 RX ADMIN — Medication 10 ML: at 08:22

## 2020-03-05 RX ADMIN — POTASSIUM CHLORIDE 40 MEQ: 1.5 POWDER, FOR SOLUTION ORAL at 08:16

## 2020-03-05 RX ADMIN — ASPIRIN 81 MG 81 MG: 81 TABLET ORAL at 08:16

## 2020-03-05 RX ADMIN — HYDROCHLOROTHIAZIDE 50 MG: 25 TABLET ORAL at 08:16

## 2020-03-06 ENCOUNTER — READMISSION MANAGEMENT (OUTPATIENT)
Dept: CALL CENTER | Facility: HOSPITAL | Age: 65
End: 2020-03-06

## 2020-03-07 ENCOUNTER — HOSPITAL ENCOUNTER (OUTPATIENT)
Dept: MAMMOGRAPHY | Facility: HOSPITAL | Age: 65
Discharge: HOME | End: 2020-03-07
Admitting: GENERAL PRACTICE

## 2020-03-07 ENCOUNTER — LAB (OUTPATIENT)
Dept: LAB | Facility: HOSPITAL | Age: 65
End: 2020-03-07

## 2020-03-07 ENCOUNTER — TRANSCRIBE ORDERS (OUTPATIENT)
Dept: ADMINISTRATIVE | Facility: HOSPITAL | Age: 65
End: 2020-03-07

## 2020-03-07 DIAGNOSIS — Z12.31 SCREENING MAMMOGRAM, ENCOUNTER FOR: ICD-10-CM

## 2020-03-07 DIAGNOSIS — R73.9 HYPERGLYCEMIA: ICD-10-CM

## 2020-03-07 DIAGNOSIS — R73.9 HYPERGLYCEMIA: Primary | ICD-10-CM

## 2020-03-07 LAB
GLUCOSE 1H P 100 G GLC PO SERPL-MCNC: 155 MG/DL (ref 74–180)
GLUCOSE 2H P 100 G GLC PO SERPL-MCNC: 184 MG/DL (ref 74–155)
GLUCOSE 3H P 100 G GLC PO SERPL-MCNC: 133 MG/DL (ref 74–140)
GLUCOSE P FAST SERPL-MCNC: 107 MG/DL (ref 65–99)

## 2020-03-07 PROCEDURE — 82951 GLUCOSE TOLERANCE TEST (GTT): CPT

## 2020-03-07 PROCEDURE — 36415 COLL VENOUS BLD VENIPUNCTURE: CPT

## 2020-03-07 PROCEDURE — 77063 BREAST TOMOSYNTHESIS BI: CPT

## 2020-03-07 PROCEDURE — 82952 GTT-ADDED SAMPLES: CPT

## 2020-03-07 PROCEDURE — 77067 SCR MAMMO BI INCL CAD: CPT

## 2020-03-07 NOTE — OUTREACH NOTE
Prep Survey      Responses   [facility] facility patient discharged from?  [facility]   Is LACE score < 7 ?  No   Eligibility  Readm Mgmt   Discharge diagnosis  chest pain, heart cath   Does the patient have one of the following disease processes/diagnoses(primary or secondary)?  Other   Does the patient have Home health ordered?  No   Is there a DME ordered?  No   Prep survey completed?  Yes          Belgica Newman RN

## 2020-03-10 ENCOUNTER — READMISSION MANAGEMENT (OUTPATIENT)
Dept: CALL CENTER | Facility: HOSPITAL | Age: 65
End: 2020-03-10

## 2020-03-10 NOTE — OUTREACH NOTE
Medical Week 1 Survey      Responses   [facility] [place] patient discharged from?  [facility]   Does the patient have one of the following disease processes/diagnoses(primary or secondary)?  Other   Is there a successful TCM telephone encounter documented?  No   Week 1 attempt successful?  Yes   Call start time  1426   Call end time  1429   Discharge diagnosis  chest pain, heart cath   Meds reviewed with patient/caregiver?  Yes   Is the patient having any side effects they believe may be caused by any medication additions or changes?  No   Does the patient have all medications ordered at discharge?  Yes   Is the patient taking all medications as directed (includes completed medication regime)?  Yes   Does the patient have a primary care provider?   Yes   Does the patient have an appointment with their PCP within 7 days of discharge?  Yes   Has the patient kept scheduled appointments due by today?  Yes   Has home health visited the patient within 72 hours of discharge?  N/A   Did the patient receive a copy of their discharge instructions?  Yes   Nursing interventions  Reviewed instructions with patient   What is the patient's perception of their health status since discharge?  Improving   Is the patient/caregiver able to teach back signs and symptoms related to disease process for when to call PCP?  Yes   Is the patient/caregiver able to teach back signs and symptoms related to disease process for when to call 911?  Yes   Is the patient/caregiver able to teach back the hierarchy of who to call/visit for symptoms/problems? PCP, Specialist, Home health nurse, Urgent Care, ED, 911  Yes   Additional teach back comments  Patient states she is doing well.  Has followed up with Dr. Beasley, had her mammogram done and glucose fasting test.     Week 1 call completed?  Yes   Graduated  Yes   Did the patient feel the follow up calls were helpful during their recovery period?  Yes   Was the number of calls appropriate?  Yes    Graduated/Revoked comments  No questions or needs at this time          Ashlyn Maradiaga, AYESHAN

## 2021-03-22 ENCOUNTER — TRANSCRIBE ORDERS (OUTPATIENT)
Dept: ADMINISTRATIVE | Facility: HOSPITAL | Age: 66
End: 2021-03-22

## 2021-03-22 DIAGNOSIS — Z12.39 SCREENING BREAST EXAMINATION: Primary | ICD-10-CM

## 2021-03-26 ENCOUNTER — HOSPITAL ENCOUNTER (OUTPATIENT)
Dept: MAMMOGRAPHY | Facility: HOSPITAL | Age: 66
Discharge: HOME | End: 2021-03-26
Admitting: GENERAL PRACTICE

## 2021-03-26 DIAGNOSIS — Z12.39 SCREENING BREAST EXAMINATION: ICD-10-CM

## 2021-03-26 PROCEDURE — 77067 SCR MAMMO BI INCL CAD: CPT

## 2021-03-26 PROCEDURE — 77063 BREAST TOMOSYNTHESIS BI: CPT

## 2021-05-07 ENCOUNTER — TRANSCRIBE ORDERS (OUTPATIENT)
Dept: ADMINISTRATIVE | Facility: HOSPITAL | Age: 66
End: 2021-05-07

## 2021-05-07 DIAGNOSIS — Z78.0 OSTEOPENIA AFTER MENOPAUSE: ICD-10-CM

## 2021-05-07 DIAGNOSIS — Z78.0 POSTMENOPAUSAL: Primary | ICD-10-CM

## 2021-05-07 DIAGNOSIS — M85.80 OSTEOPENIA AFTER MENOPAUSE: ICD-10-CM

## 2021-05-07 DIAGNOSIS — Z00.00 ROUTINE GENERAL MEDICAL EXAMINATION AT A HEALTH CARE FACILITY: ICD-10-CM

## 2021-05-14 ENCOUNTER — APPOINTMENT (OUTPATIENT)
Dept: BONE DENSITY | Facility: HOSPITAL | Age: 66
End: 2021-05-14

## 2022-03-31 ENCOUNTER — TRANSCRIBE ORDERS (OUTPATIENT)
Dept: ADMINISTRATIVE | Facility: HOSPITAL | Age: 67
End: 2022-03-31

## 2022-03-31 DIAGNOSIS — Z12.31 SCREENING MAMMOGRAM, ENCOUNTER FOR: Primary | ICD-10-CM

## 2022-04-06 ENCOUNTER — TRANSCRIBE ORDERS (OUTPATIENT)
Dept: PHYSICAL THERAPY | Facility: CLINIC | Age: 67
End: 2022-04-06

## 2022-04-06 DIAGNOSIS — M71.50 TRAUMATIC BURSITIS: ICD-10-CM

## 2022-04-06 DIAGNOSIS — S80.02XA CONTUSION OF LEFT KNEE, INITIAL ENCOUNTER: Primary | ICD-10-CM

## 2022-04-08 ENCOUNTER — HOSPITAL ENCOUNTER (OUTPATIENT)
Dept: MAMMOGRAPHY | Facility: HOSPITAL | Age: 67
Discharge: HOME | End: 2022-04-08
Admitting: GENERAL PRACTICE

## 2022-04-08 DIAGNOSIS — Z12.31 SCREENING MAMMOGRAM, ENCOUNTER FOR: ICD-10-CM

## 2022-04-08 PROCEDURE — 77063 BREAST TOMOSYNTHESIS BI: CPT

## 2022-04-08 PROCEDURE — 77067 SCR MAMMO BI INCL CAD: CPT

## 2022-05-16 ENCOUNTER — TREATMENT (OUTPATIENT)
Dept: PHYSICAL THERAPY | Facility: CLINIC | Age: 67
End: 2022-05-16

## 2022-05-16 DIAGNOSIS — M71.50 TRAUMATIC BURSITIS: ICD-10-CM

## 2022-05-16 DIAGNOSIS — S80.02XA CONTUSION OF LEFT KNEE, INITIAL ENCOUNTER: Primary | ICD-10-CM

## 2022-05-16 PROCEDURE — 97014 ELECTRIC STIMULATION THERAPY: CPT | Performed by: PHYSICAL THERAPIST

## 2022-05-16 PROCEDURE — 97161 PT EVAL LOW COMPLEX 20 MIN: CPT | Performed by: PHYSICAL THERAPIST

## 2022-05-16 PROCEDURE — 97110 THERAPEUTIC EXERCISES: CPT | Performed by: PHYSICAL THERAPIST

## 2022-05-16 NOTE — PROGRESS NOTES
Physical Therapy Initial Evaluation and Plan of Care    Patient: Scarlet Gong   : 1955  Diagnosis/ICD-10 Code:  Contusion of left knee, initial encounter [S80.02XA]  Referring practitioner: ERIKA Card  Date of Initial Visit: 2022  Today's Date: 2022  Patient seen for 1 sessions           Subjective Questionnaire: LEFS: 52/80 = 65%      Subjective Evaluation    Pain  Current pain ratin  At best pain ratin  Quality: sharp, discomfort and dull ache  Progression: no change    Diagnostic Tests  X-ray: abnormal (moderate medial compartment and mild lateral compartment and patellofemoral osteophyte formation. There is moderate narrowing of the medial compartment joint space)    Patient Goals  Patient goals for therapy: decreased edema, decreased pain, improved balance, increased motion, increased strength, independence with ADLs/IADLs, return to sport/leisure activities and return to work         Pt fell on black ice at work Feb 7. Fell on knees. L worse than R. R has resolved. L still bothering her. She felt like her knee was numb. Has stayed red at front of knee. She went to [facility], no imaging, taking ibuprofen, using heat and ice. She notes some swelling, some feeling of stiffness, sometimes camilla/feels that will give out/catches while walking. Worse with prolonged walking, causes her to limp and sometimes stop. Sitting too long aggravates. Hurts to bend and straighten knee. Nothing makes better, but it quits on its own, not constant. She reports not worse or better at time of day. Ok with dressing and socks/shoes. Ok with sleep. Getting up/down from chair is difficulty, sometimes feeling of giving out. Difficulty with stairs, 6 to get in/out of house, goes sideways; worst coming down. Kneeling and squatting and lunging limited. Some difficulty stepping over tub, no grab bar. Standing limited. Ok driving short distances. Has not tried brace or knee sleeve. No change with  shoewear.    2/10 (0/10, 4/10)    She is currently working - does some walking, desk work, a lot of driving/transportation. Has been avoiding doing too many stairs at work, coworkers help.    Previous L knee surgery years ago, cartilage. Knee pain prior to this injury but worse intensity/more often post injury. Was able to do stairs normally before.     Objective          Observations   Left Knee   Positive for abrasion.     Additional Knee Observation Details  Sit to stand: mild decrased WB L , mild UE support    Standing: mildly flexed posture, Genu valgus L> R    Redness patellar tendon L        Palpation     Additional Palpation Details  Mild tenderness patellar tendon,   negaitve medial and lateral joint line    Neurological Testing     Additional Neurological Details  Intact. Pt reports initially less sensation L anterior knee    Active Range of Motion   Left Knee   Flexion: 121 degrees   Extensor la degrees     Right Knee   Flexion: 128 degrees   Extensor la degrees     Additional Active Range of Motion Details  Mild pulling no significant discomfort  Limited ankle DF, limited hip ext L. Otherwise WFL gross screen.    Functional hip ER: tight bilat, opposite ankle    Patellar Mobility     Additional Patellar Mobility Details  Mild crepitus with LAQ L    Strength/Myotome Testing     Left Knee   Quadriceps contraction: fair    Right Knee   Quadriceps contraction: good    Additional Strength Details  LE strength all painfree sitting, 4+/5 throughout except 4-/5 hip flexor bilat. 4/5 knee ext L and knee flex. Sidelying hip abd 3+/5 L, R not tested.    Tests     Left Knee   Negative anterior drawer, Apley's compression, patellar compression, valgus stress test at 0 degrees, valgus stress test at 30 degrees, varus stress test at 0 degrees and varus stress test at 30 degrees.     Left Knee Flexibility Comments:   Mild quad muscle length restriction, mild hip flexor length restriction, hamsring not tested    "    General Comments     Knee Comments  Single leg balance: L 6\" with increased sway, R 8\" is good    Ambulation: independent without AD, mild scissoring, genu valgus,  Increased WB lateral border foot,     Stairs: not observed, pt reports step to up, sideways down.            Assessment & Plan     Assessment  Impairments: abnormal gait, abnormal muscle firing, abnormal or restricted ROM, activity intolerance, impaired balance, impaired physical strength, lacks appropriate home exercise program and pain with function  Functional Limitations: lifting, walking, uncomfortable because of pain, sitting, standing and stooping  Assessment details: Pt with onset of knee pain after a fall 2/7/22. She has mild ROM deficits, mild strength and flexibility restrictions, mild pain, mild gait impairments. Symptoms limit her walking, driving, and ability to navigate stairs which she has at her home and work. She is motivated to participate in therapy. She would benefit from skilled PT to address impairments and maximize function and return to PLOF    Barriers to therapy: asthma, HTN, hyperlipidemia, obesity, osteopenia,   Prognosis: good    Goals  Plan Goals: STG  1. Pt to demonstrate knee flexion L = R painfree in 2 weeks  2. Pt to demonstrate knee ext 0 in 2 weeks  3. Pt to demonstrate quad set independently in 2 weeks  4. Pt to demonstrate SLR without extensor lag in 3 weeks   5. Pt independent with HEP in 3 weeks    LTG  1. Pt to demonstrate gastroc/soleus length for WFL ankle DF required for desc stairs with less stress at knee  2. Pt to demonstrate LE strength 4+/5 throughout for improved stairs, squat, lunge by D/C  3. Pt to walk 6 min walk test 300 ft or better without increased L knee pain.  4. Pt to voice readiness for D/C to independent HEP and self management of symptoms  5. Pt to demonstrate good single leg stability L 15 seconds or greater by D/C    Plan  Therapy options: will be seen for skilled therapy " services  Planned modality interventions: ultrasound, thermotherapy (hydrocollator packs), dry needling, cryotherapy and electrical stimulation/Russian stimulation  Planned therapy interventions: balance/weight-bearing training, flexibility, functional ROM exercises, gait training, home exercise program, joint mobilization, manual therapy, neuromuscular re-education, postural training, soft tissue mobilization, strengthening, stretching and therapeutic activities  Other planned therapy interventions: aquatic therapy as needed  Frequency: 3x week  Duration in visits: 10  Treatment plan discussed with: patient          History # of Personal Factors and/or Comorbidities: MODERATE (1-2)  Examination of Body System(s): # of elements: LOW (1-2)  Clinical Presentation: STABLE   Clinical Decision Making: LOW     Timed:         Manual Therapy:         mins  29957;     Therapeutic Exercise:    10     mins  12888;     Neuromuscular Estevan:        mins  37453;    Therapeutic Activity:          mins  70617;     Gait Training:           mins  23382;     Ultrasound:          mins  85273;    Ionto                                   mins   86716  Self Care                            mins   88391  Aquatic                               mins 54603      Un-Timed:  Electrical Stimulation:    10     mins  74671 ( );  Dry Needling          mins self-pay  Traction          mins 68480  Low Eval      35    Mins  34996  Mod Eval          Mins  79011  High Eval                            Mins  74463  Re-Eval                               mins  30962        Timed Treatment:   10   mins   Total Treatment:     55   mins    PT SIGNATURE: Sussy Obando, LASHAY   DATE TREATMENT INITIATED: 5/17/2022    Initial Certification  Certification Period: 8/15/2022  I certify that the therapy services are furnished while this patient is under my care.  The services outlined above are required by this patient, and will be reviewed every 90 days.     PHYSICIAN:  Garcia Butler PA      DATE:     Please sign and return via fax to [phone].. Thank you, [facility].

## 2022-05-18 ENCOUNTER — TREATMENT (OUTPATIENT)
Dept: PHYSICAL THERAPY | Facility: CLINIC | Age: 67
End: 2022-05-18

## 2022-05-18 DIAGNOSIS — S80.02XA CONTUSION OF LEFT KNEE, INITIAL ENCOUNTER: Primary | ICD-10-CM

## 2022-05-18 DIAGNOSIS — M71.50 TRAUMATIC BURSITIS: ICD-10-CM

## 2022-05-18 PROCEDURE — 97035 APP MDLTY 1+ULTRASOUND EA 15: CPT | Performed by: PHYSICAL THERAPIST

## 2022-05-18 PROCEDURE — 97110 THERAPEUTIC EXERCISES: CPT | Performed by: PHYSICAL THERAPIST

## 2022-05-18 NOTE — PROGRESS NOTES
Physical Therapy Daily Progress Note    VISIT#: 2     Diagnosis Plan   1. Contusion of left knee, initial encounter     2. Traumatic bursitis       Subjective   Scarlet Gong reports: Not hurting this morning so far. Bothered her yesterday, did a lot of driving. IFC felt good last visit, no issues with HEP.    Objective     See Exercise, Manual, and Modality Logs for complete treatment.     Reviewed HEP.  1 episode cramp L hamstring, resolved with rest and massage, stretching.    Assessment/Plan Pt out of breath quickly with RCB, reports has been an issue previous covid pnemonia. Good tolerance to exercise, mild fatigue. Positive response to ultrasound without adverse effect.    Progress per Plan of Care Continue to progress as tolerated.           Timed:         Manual Therapy:         mins  89409;     Therapeutic Exercise:    38     mins  75294;     Neuromuscular Estevan:        mins  52589;    Therapeutic Activity:          mins  74176;     Gait Training:           mins  15120;     Ultrasound:       8   mins  26562;    Ionto                                   mins   79116  Self Care                            mins   81349  Canalith Repos                   mins  4209  Aquatic                               mins 76103    Un-Timed:  Electrical Stimulation:         mins  57858 ( );  Dry Needling          mins self-pay  Traction          mins 39724  Low Eval          Mins  43815  Mod Eval          Mins  27748  High Eval                            Mins  15177  Re-Eval                               mins  88584    Timed Treatment:   46   mins   Total Treatment:     46  mins    Sussy Obando, PT

## 2022-05-23 ENCOUNTER — TREATMENT (OUTPATIENT)
Dept: PHYSICAL THERAPY | Facility: CLINIC | Age: 67
End: 2022-05-23

## 2022-05-23 DIAGNOSIS — M71.50 TRAUMATIC BURSITIS: ICD-10-CM

## 2022-05-23 DIAGNOSIS — S80.02XA CONTUSION OF LEFT KNEE, INITIAL ENCOUNTER: Primary | ICD-10-CM

## 2022-05-23 PROCEDURE — 97035 APP MDLTY 1+ULTRASOUND EA 15: CPT | Performed by: PHYSICAL THERAPIST

## 2022-05-23 PROCEDURE — 97110 THERAPEUTIC EXERCISES: CPT | Performed by: PHYSICAL THERAPIST

## 2022-05-23 NOTE — PROGRESS NOTES
Physical Therapy Daily Progress Note    VISIT#: 3     Diagnosis Plan   1. Contusion of left knee, initial encounter     2. Traumatic bursitis       Subjective   Scarlet Gong reports: busy day today, coworker is out. Pain same. Not too tired last visit, tolerated initial session well. Stairs and walking too long aggravates.    Objective     See Exercise, Manual, and Modality Logs for complete treatment.     Patient Education: discussed interval walking.    Assessment/Plan Pt reports feeling a little better post treatment. Initial discomfort L anteromedial knee during LAQ but resolved with change in positioning.      Progress per Plan of Care Continue to work on knee ext, add hip flexor stretch.             Timed:         Manual Therapy:         mins  54605;     Therapeutic Exercise:   45      mins  83535;     Neuromuscular Estevan:        mins  78303;    Therapeutic Activity:          mins  14773;     Gait Training:           mins  81710;     Ultrasound:     8     mins  98667;    Ionto                                   mins   41777  Self Care                            mins   35258  Canalith Repos                   mins  4209  Aquatic                               mins 87026    Un-Timed:  Electrical Stimulation:         mins  07688 ( );  Dry Needling          mins self-pay  Traction          mins 25573  Low Eval          Mins  01509  Mod Eval          Mins  59789  High Eval                            Mins  01498  Re-Eval                               mins  98698    Timed Treatment:   53   mins   Total Treatment:     53   mins    Sussy Obando, PT

## 2022-05-25 ENCOUNTER — TREATMENT (OUTPATIENT)
Dept: PHYSICAL THERAPY | Facility: CLINIC | Age: 67
End: 2022-05-25

## 2022-05-25 DIAGNOSIS — M71.50 TRAUMATIC BURSITIS: ICD-10-CM

## 2022-05-25 DIAGNOSIS — S80.02XA CONTUSION OF LEFT KNEE, INITIAL ENCOUNTER: Primary | ICD-10-CM

## 2022-05-25 PROCEDURE — 97110 THERAPEUTIC EXERCISES: CPT | Performed by: PHYSICAL THERAPIST

## 2022-05-25 NOTE — PROGRESS NOTES
Physical Therapy Daily Progress Note    VISIT#: 4     Diagnosis Plan   1. Contusion of left knee, initial encounter     2. Traumatic bursitis       Subjective   Scarlet Gong reports: tired today. 3/10 knee pain.    Pt plans to make an appt with pulmonologist re: shortness of breath and L shoulder pain, all cardiac tests negative and PCP aware per pt. Not new issues, been present since covid 19 pnemonia. US feels good, no improvement with redness at patellar tendon.    Objective     See Exercise, Manual, and Modality Logs for complete treatment.     Cryo vs. us today. Pt reports good response to cryo.    Assessment/Plan Pt demonstrating mild strength and ROM increases Good tolerance to exercise progression    Progress per Plan of Care Continue to progress as tolerated. Progress resistance of HS curl.             Timed:         Manual Therapy:         mins  95324;     Therapeutic Exercise:    40     mins  89066;     Neuromuscular Estevan:        mins  48037;    Therapeutic Activity:          mins  70613;     Gait Training:           mins  44404;     Ultrasound:          mins  98408;    Ionto                                   mins   96953  Self Care                            mins   03446  Canalith Repos                   mins  4209  Aquatic                               mins 38822    Un-Timed:  Electrical Stimulation:         mins  40688 ( );  Dry Needling          mins self-pay  Traction          mins 36584  Low Eval          Mins  09345  Mod Eval          Mins  68112  High Eval                            Mins  17393  Re-Eval                               mins  06066    Timed Treatment:   40   mins   Total Treatment:     40   mins    Sussy Obando, PT

## 2022-05-26 ENCOUNTER — TREATMENT (OUTPATIENT)
Dept: PHYSICAL THERAPY | Facility: CLINIC | Age: 67
End: 2022-05-26

## 2022-05-26 DIAGNOSIS — S80.02XA CONTUSION OF LEFT KNEE, INITIAL ENCOUNTER: Primary | ICD-10-CM

## 2022-05-26 DIAGNOSIS — M71.50 TRAUMATIC BURSITIS: ICD-10-CM

## 2022-05-26 PROCEDURE — 97110 THERAPEUTIC EXERCISES: CPT | Performed by: PHYSICAL THERAPIST

## 2022-05-26 NOTE — PROGRESS NOTES
Physical Therapy Daily Progress Note    VISIT#: 5     Diagnosis Plan   1. Contusion of left knee, initial encounter     2. Traumatic bursitis       Subjective   Scarlet Gong reports: did fine with treatment yesterday. Liked ice better than US.       Objective     See Exercise, Manual, and Modality Logs for complete treatment.     Patient Education:    Assessment/Plan Pt continues to participate well in therapy and tolerating progression very well. Demonstrating improvement in quad control/recruitment. Quick fatigue heel raise toe raise.      Progress per Plan of Care Reassess next visit.            Timed:         Manual Therapy:  5       mins  24494;     Therapeutic Exercise:   45      mins  27212;     Neuromuscular Estevan:        mins  23076;    Therapeutic Activity:          mins  20546;     Gait Training:           mins  87386;     Ultrasound:         mins  64048;    Ionto                                   mins   55962  Self Care                            mins   86646  Canalith Repos                   mins  4209  Aquatic                               mins 54611    Un-Timed:  Electrical Stimulation:         mins  37477 ( );  Dry Needling          mins self-pay  Traction          mins 18383  Low Eval          Mins  51852  Mod Eval          Mins  26004  High Eval                            Mins  58997  Re-Eval                               mins  70004    Timed Treatment:   50   mins   Total Treatment:     50   mins    Sussy Obando, PT

## 2022-06-01 ENCOUNTER — TREATMENT (OUTPATIENT)
Dept: PHYSICAL THERAPY | Facility: CLINIC | Age: 67
End: 2022-06-01

## 2022-06-01 DIAGNOSIS — M71.50 TRAUMATIC BURSITIS: ICD-10-CM

## 2022-06-01 DIAGNOSIS — S80.02XA CONTUSION OF LEFT KNEE, INITIAL ENCOUNTER: Primary | ICD-10-CM

## 2022-06-01 PROCEDURE — 97110 THERAPEUTIC EXERCISES: CPT | Performed by: PHYSICAL THERAPIST

## 2022-06-01 PROCEDURE — 97140 MANUAL THERAPY 1/> REGIONS: CPT | Performed by: PHYSICAL THERAPIST

## 2022-06-01 NOTE — PROGRESS NOTES
"Re-Assessment / Re-Certification        Patient: Scarlet Gong   : 1955  Diagnosis/ICD-10 Code:  Contusion of left knee, initial encounter [S80.02XA]  Referring practitioner: ERIKA Card  Date of Initial Visit: Type: THERAPY  Noted: 2022  Today's Date: 2022  Patient seen for 6 sessions      Subjective:   Scarlet Gong reports: better than was, still problems with stairs. Had sharp pain sitting watching TV on . Hitting knee also hurts. Superior knee and patellar tendon. Cant walk long distances. Tolerating therapy well, is helping. Has not yet retured to PLOF. 3-4/10    Subjective Questionnaire: LEFS: 77/80 = 96%  Clinical Progress: improved  Home Program Compliance: Yes  Treatment has included: therapeutic exercise, neuromuscular re-education, manual therapy, gait training, electrical stimulation, ultrasound, moist heat and cryotherapy    Subjective   Objective     LE strength sitting 4+/5 throughout except mild posterior lean with hip flex 4/5 bilat, 4/5 knee ext, 4+/5 knee flexion, sidelying hip abd 4/5 bilat. Prone knee flex 4/5, prone hip ext 4/5. Independent quad set. SLR with mlid extensor lag, quick fatigue especially VMO    Functional hip ER: posterior lean limited mid shin bilat    Prone quad stretch L> R    L knee AROM:  flex 122 painfree, knee ext -6  Hip PROM  IR 20, ER 50, flex is WFL, Mild Hip flexor tightness bilat     Appley negative    2 episode popping painfree L knee    Mild redness patellar tendon; decreased size and intensity from eval    Single leg balance not tested    Ambulation genu valgus, longer stride length, less scissoring than initial  Stairs: ok with 4\" stairs, pain with 6\" stairs.    Assessment/Plan Pt making progress toward functional goals and would benefit from continued skilled PT.     1. Pt to demonstrate knee flexion L = R painfree in 2 weeks- PARTIALLY MET  2. Pt to demonstrate knee ext 0 in 2 weeks - PARTIALLY MET  3. Pt to demonstrate quad " set independently in 2 weeks - MET  4. Pt to demonstrate SLR without extensor lag in 3 weeks  - PARTIALLY MET  5. Pt independent with HEP in 3 weeks - MET    LTG  1. Pt to demonstrate gastroc/soleus length for WFL ankle DF required for desc stairs with less stress at knee - PARTIALLY MET  2. Pt to demonstrate LE strength 4+/5 thro ughout for improved stairs, squat, lunge by D/C - PARTIALLY MET  3. Pt to walk 6 min walk test 300 ft or better without increased L knee pain. - NOT TESTED  4. Pt to voice readiness for D/C to independent HEP and self management of symptoms - NOT MET  5. Pt to demonstrate good single leg stability L 15 seconds or greater by D/C - NOT TESTED.    Progress toward previous goals: Partially Met        Recommendations: Continue as planned, pt with 4 remaining visits in current POC.    Prognosis to achieve goals: good    PT Signature: Sussy Obando, PT      Based upon review of the patient's progress and continued therapy plan, it is my medical opinion that Scarlet Gong should continue physical therapy treatment at [facility]  [address] [place] [address] [place] [address] [phone].    Signature: __________________________________  Garcia Butler PA    Timed:         Manual Therapy:    8     mins  84548;     Therapeutic Exercise:    35     mins  00869;     Neuromuscular Estevan:        mins  86192;    Therapeutic Activity:     2     mins  46034;     Gait Training:           mins  89464;     Ultrasound:          mins  40353;    Ionto                                   mins   48090  Self Care                            mins   59836  Aquatic                               mins 37990      Un-Timed:  Electrical Stimulation:         mins  08172 ( );  Dry Needling          mins self-pay  Traction          mins 95043  Low Eval          Mins  66274  Mod Eval          Mins  44859  High Eval                            Mins  59644  Re-Eval                                mins  95953      Timed Treatment:   45   mins   Total Treatment:     45   mins

## 2022-06-03 ENCOUNTER — TELEPHONE (OUTPATIENT)
Dept: PHYSICAL THERAPY | Facility: CLINIC | Age: 67
End: 2022-06-03

## 2022-06-30 ENCOUNTER — TELEPHONE (OUTPATIENT)
Dept: ORTHOPEDIC SURGERY | Facility: CLINIC | Age: 67
End: 2022-06-30

## 2022-06-30 ENCOUNTER — OFFICE VISIT (OUTPATIENT)
Dept: ORTHOPEDIC SURGERY | Facility: CLINIC | Age: 67
End: 2022-06-30

## 2022-06-30 VITALS
WEIGHT: 218.4 LBS | SYSTOLIC BLOOD PRESSURE: 146 MMHG | HEIGHT: 63 IN | BODY MASS INDEX: 38.7 KG/M2 | DIASTOLIC BLOOD PRESSURE: 69 MMHG | HEART RATE: 85 BPM

## 2022-06-30 DIAGNOSIS — M25.562 ACUTE PAIN OF LEFT KNEE: Primary | ICD-10-CM

## 2022-06-30 PROCEDURE — 20610 DRAIN/INJ JOINT/BURSA W/O US: CPT | Performed by: ORTHOPAEDIC SURGERY

## 2022-06-30 PROCEDURE — 97760 ORTHOTIC MGMT&TRAING 1ST ENC: CPT | Performed by: ORTHOPAEDIC SURGERY

## 2022-06-30 PROCEDURE — 99203 OFFICE O/P NEW LOW 30 MIN: CPT | Performed by: ORTHOPAEDIC SURGERY

## 2022-06-30 RX ORDER — ASPIRIN 81 MG/1
81 TABLET ORAL DAILY
COMMUNITY

## 2022-06-30 RX ORDER — TRIAMCINOLONE ACETONIDE 40 MG/ML
80 INJECTION, SUSPENSION INTRA-ARTICULAR; INTRAMUSCULAR ONCE
Status: COMPLETED | OUTPATIENT
Start: 2022-06-30 | End: 2022-07-01

## 2022-06-30 NOTE — TELEPHONE ENCOUNTER
CALLED AND SPOKE TO [facility], VERIFIED FAX NUMBER AND FAXED OFFICE NOTES FROM TODAY 06/30/2022 TO FAX PROVIDED.

## 2022-06-30 NOTE — PROGRESS NOTES
"     Patient ID: Scarlet Gong is a 67 y.o. female.    Chief Complaint:    Chief Complaint   Patient presents with   • Left Knee - Initial Evaluation, Pain     Pain 5       HPI:  This is a 67-year-old female who fell at work earlier this year.  She banged her left knee and since then has had discoloration as well as bruising to the area.  She has had 8 sessions of physical therapy with mild results.  She has not worn her brace.  She is working currently.  She has continued swelling and pain  Past Medical History:   Diagnosis Date   • Asthma 04/27/2016   • Hyperlipidemia 03/02/2020   • Hypertension 03/02/2020   • Obesity (BMI 30-39.9) 03/02/2020   • Osteopenia 03/02/2020       Past Surgical History:   Procedure Laterality Date   • CARDIAC CATHETERIZATION N/A 03/04/2020    Procedure: Left Heart Cath and coronary angiogram;  Surgeon: Hien Camacho MD;  Location: [facility] CATH INVASIVE LOCATION;  Service: Cardiovascular;  Laterality: N/A;   • CARDIAC CATHETERIZATION N/A 03/04/2020    Procedure: Left ventriculography;  Surgeon: Hien Camacho MD;  Location: [facility] CATH INVASIVE LOCATION;  Service: Cardiovascular;  Laterality: N/A;       Family History   Problem Relation Age of Onset   • Hypertension Mother    • Heart disease Sister    • Heart disease Brother    • Colon cancer Maternal Grandmother           Social History     Occupational History   • Not on file   Tobacco Use   • Smoking status: Former Smoker     Packs/day: 1.50     Years: 32.00     Pack years: 48.00     Types: Cigarettes   • Smokeless tobacco: Not on file   Substance and Sexual Activity   • Alcohol use: Never   • Drug use: Never   • Sexual activity: Defer      Review of Systems   Cardiovascular: Negative for chest pain.   Musculoskeletal: Positive for arthralgias.       Objective:    /69   Pulse 85   Ht 160 cm (63\")   Wt 99.1 kg (218 lb 6.4 oz)   BMI 38.69 kg/m²     Physical Examination:  Left knee demonstrates intact skin.  She has a " residual skin change over the tibial tubercle but intact assessment mechanism.  No sign of infection.  Mild knee effusion.  Knee range of motion 0-1 25 no varus valgus laxity.  Lachman anterior posterior drawer negative.  Sensory and motor exam are intact in all distributions. Dorsalis pedis and posterior tibialis pulses are palpable and capillary refill is less than two seconds to all digits.    Imaging:  Previous x-rays demonstrate no fracture moderate medial compartment arthritis    Assessment:  Left knee contusion degenerative joint disease    Plan:  Treatment options discussed, recommend a patella tracking brace which was dispensed today.  Also I recomend injection after today's evaluation.  Risks and benefits were discussed. Under sterile technique and after timeout and verbal consent I injected 80 mg of Kenalog and 2 mL of 1% Lidocaine plain into the knee. It was well tolerated. Postinjection instructions were given.  Resume therapy, continue work see me in a month  Greater than 15 minutes was spent demonstrating proper fit and use of the device and signs to monitor for complications      Procedures         Disclaimer: Part of this note may be an electronic transcription/translation of spoken language to printed text using the Dragon Dictation System

## 2022-06-30 NOTE — TELEPHONE ENCOUNTER
Caller: VINICIUS    Relationship:     Best call back number: [phone]    What form or medical record are you requesting: OFFICE NOTES 6/30    Who is requesting this form or medical record from you: EMPLOYER [employer]    How would you like to receive the form or medical records (pick-up, mail, fax): FAX  If fax, what is the fax number: [phone]    Timeframe paperwork needed: 7/1    Additional notes: HAS 24 HOURS TO SUBMIT THE DOCUMENTATION TO [employer], SO NEEDS THIS ASAP

## 2022-07-01 RX ADMIN — TRIAMCINOLONE ACETONIDE 80 MG: 40 INJECTION, SUSPENSION INTRA-ARTICULAR; INTRAMUSCULAR at 07:13

## 2022-07-05 ENCOUNTER — TELEPHONE (OUTPATIENT)
Dept: ORTHOPEDIC SURGERY | Facility: CLINIC | Age: 67
End: 2022-07-05

## 2022-07-05 DIAGNOSIS — M25.562 ACUTE PAIN OF LEFT KNEE: Primary | ICD-10-CM

## 2022-07-05 NOTE — TELEPHONE ENCOUNTER
Caller: MATEO GREGORY    Relationship: SELF    Best call back number: [phone]    What orders are you requesting (i.e. lab or imaging): PHYSICAL THERAPY FOR LEFT KNEE    In what timeframe would the patient need to come in: ASAP    Where will you receive your lab/imaging services:  PHYSICAL/OCCUPATIONAL THERAPY    Additional notes: NEED ORDERS SENT; PT OFFICE SAYS THEY HAVE NOT RECEIVED ANY

## 2022-07-05 NOTE — TELEPHONE ENCOUNTER
CALLED PATIENT, I DID NOT SEE ANY ORDER FOR PT TO SEND. PATIENT STATES AT HER LAST APPOINTMENT DR MCHUGH WAS SENDING HER BACK TO PT WITH [address]. PLEASE ADVISE.

## 2022-07-14 ENCOUNTER — TREATMENT (OUTPATIENT)
Dept: PHYSICAL THERAPY | Facility: CLINIC | Age: 67
End: 2022-07-14

## 2022-07-14 DIAGNOSIS — M71.50 TRAUMATIC BURSITIS: ICD-10-CM

## 2022-07-14 DIAGNOSIS — M25.562 ACUTE PAIN OF LEFT KNEE: ICD-10-CM

## 2022-07-14 DIAGNOSIS — S80.02XA CONTUSION OF LEFT KNEE, INITIAL ENCOUNTER: Primary | ICD-10-CM

## 2022-07-14 PROCEDURE — 97110 THERAPEUTIC EXERCISES: CPT | Performed by: PHYSICAL THERAPIST

## 2022-07-14 NOTE — PROGRESS NOTES
Re-Assessment / Re-Certification        Patient: Scarlet Gong   : 1955  Diagnosis/ICD-10 Code:  Contusion of left knee, initial encounter [S80.02XA]  Referring practitioner: Christoph Casas, *  Date of Initial Visit: Type: THERAPY  Noted: 2022  Today's Date: 2022  Patient seen for 7 sessions      Subjective:   Sacrlet Gong reports: referred to ortho. Saw Dr Casas, got injection in knee and knee brace, been helping. 0/10.  Intermittent painfree popping. Gets crepitus. Ok going up 2 steps, but hurts if does more than that; pressure bottom of knee with prolonged shopping up to 6/10. Doing HEP some, though has been sick with pnemonia and bronchitis.   Subjective Questionnaire: LEFS: 56/64 = 88%  Clinical Progress: improved  Home Program Compliance: fair  Treatment has included: therapeutic exercise, manual therapy, therapeutic activity, ultrasound and cryotherapy    Subjective   Objective   Positive for abrasion.     Additional Knee Observation Details  Sit to stand: unremarkable    Standing: mildly flexed posture, Genu valgus L> R    Redness patellar tendon L  Pt wearing patellar support brace     Palpation     Additional Palpation Details  Mild tenderness patellar tendon,   negaitve medial and lateral joint line     Neurological Testing     Additional Neurological Details  Intact. Pt reports initially less sensation L anterior knee     Active Range of Motion   Left Knee   Flexion: 125 degrees   Extensor la degrees       Additional Active Range of Motion Details  Mild pulling no significant discomfort    Functional hip ER: tight bilat, opposite shin L and opposite patella R.    Gastroc is near WFL, limited hip ext -5 R. Hip ER with mild restriction R.     Patellar Mobility      Additional Patellar Mobility Details  Mild crepitus with LAQ L     Strength/Myotome Testing      Left Knee   Quadriceps contraction: fair     Right Knee   Quadriceps contraction: good    Additional Strength  "Details  LE strength all painfree sitting, 4+/5 throughout except 4/5 hip flexor bilat. 4/5 knee ext L and knee flex 4+ . Sidelying hip abd 4/5 L, R not tested. Prone hip ext not tested.     Tests      Left Knee Flexibility Comments:   Modquad muscle length restriction, mild hip flexor length restriction, mild hamsring restriction, mild piriformis restriction.     Genu valgus, scissoring L       General Comments      Knee Comments  Single leg balance: L 6\" with brace     Ambulation: independent without AD, mild scissoring, genu valgus,  Increased WB lateral border foot,      Stairs: not observed, pt reports step to up, sideways down.        Assessment/Plan Pt being seen for same diagnosis, has new order from specialist to continue PT.   Progress toward previous goals: Partially Met    1. Pt to demonstrate knee flexion L = R painfree in 2 weeks- PARTIALLY MET  2. Pt to demonstrate knee ext 0 in 2 weeks - PARTIALLY MET  3. Pt to demonstrate quad set independently in 2 weeks - MET  4. Pt to demonstrate SLR without extensor lag in 3 weeks  - PARTIALLY MET  5. Pt independent with HEP in 3 weeks - MET    LTG  1. Pt to demonstrate gastroc/soleus length for WFL ankle DF required for desc stairs with less stress at knee - PARTIALLY MET  2. Pt to demonstrate LE strength 4+/5 thro ughout for improved stairs, squat, lunge by D/C - PARTIALLY MET  3. Pt to walk 6 min walk test 300 ft or better without increased L knee pain. - NOT TESTED  4. Pt to voice readiness for D/C to independent HEP and self management of symptoms - NOT MET  5. Pt to demonstrate good single leg stability L 15 seconds or greater by D/C - NOT TESTED.    Access Code: [email or web address]  URL: [email or web address]  Date: 07/14/2022  Prepared by: Sussy Obando    Exercises  Gastroc Stretch on Wall - 3 x daily - 7 x weekly - 3 sets - 1 reps - 20\" hold  Supine Quadricep Sets - 3 x daily - 7 x weekly - 2 sets - 10 reps - 5\" hold  Small Range Straight Leg Raise - 1 x " "daily - 4 x weekly - 2 sets - 10 reps  Sidelying Hip Abduction - 1 x daily - 4 x weekly - 2 sets - 10 reps  Sidelying Hip Adduction - 1 x daily - 4 x weekly - 2 sets - 10 reps  Prone Hip Extension - 1 x daily - 4 x weekly - 2 sets - 10 reps  Prone Quadriceps Stretch with Strap - 2 x daily - 7 x weekly - 3 sets - 1 reps - 20\" hold  Seated Leg Press with Resistance - 1 x daily - 4 x weekly - 2 sets - 10 reps    Patient Education  Going Up and Down Stairs with Cane     Recommendations: Continue as planned, intitial POC 10 visits, extend POC 5 additional visits for total 15 visits.    Prognosis to achieve goals: good    PT Signature: Sussy Obando, PT    Based upon review of the patient's progress and continued therapy plan, it is my medical opinion that Scarlet Gong should continue physical therapy treatment at [facility]  [address] [place] [address] [place] [address] [phone].    Signature: __________________________________  Christoph Casas MD    Timed:         Manual Therapy:         mins  52467;     Therapeutic Exercise:    45     mins  94972;     Neuromuscular Estevan:        mins  38605;    Therapeutic Activity:          mins  07853;     Gait Training:           mins  25867;     Ultrasound:          mins  16814;    Ionto                                   mins   39730  Self Care                            mins   75911  Aquatic                               mins 97022      Un-Timed:  Electrical Stimulation:         mins  47484 ( );  Dry Needling          mins self-pay  Traction          mins 69869  Low Eval          Mins  08222  Mod Eval          Mins  51980  High Eval                            Mins  07079  Re-Eval                               mins  72689      Timed Treatment:   45   mins   Total Treatment:     45   mins  "

## 2022-07-18 ENCOUNTER — TREATMENT (OUTPATIENT)
Dept: PHYSICAL THERAPY | Facility: CLINIC | Age: 67
End: 2022-07-18

## 2022-07-18 DIAGNOSIS — M25.562 ACUTE PAIN OF LEFT KNEE: ICD-10-CM

## 2022-07-18 DIAGNOSIS — M71.50 TRAUMATIC BURSITIS: ICD-10-CM

## 2022-07-18 DIAGNOSIS — S80.02XA CONTUSION OF LEFT KNEE, INITIAL ENCOUNTER: Primary | ICD-10-CM

## 2022-07-18 PROCEDURE — 97110 THERAPEUTIC EXERCISES: CPT | Performed by: PHYSICAL THERAPIST

## 2022-07-18 NOTE — PROGRESS NOTES
Physical Therapy Treatment Note    VISIT#: 8     Diagnosis Plan   1. Contusion of left knee, initial encounter     2. Traumatic bursitis     3. Acute pain of left knee       Subjective   Scarlet Gong reports: tolerated last visit fine. Getting better, shot has been helping.     Objective     See Exercise, Manual, and Modality Logs for complete treatment.       Assessment/Plan Mild progression of strengthening with good tolerance.       Progress per Plan of Care Monitor and progress as tolerated.            Timed:         Manual Therapy:         mins  56630;     Therapeutic Exercise:    43     mins  64543;     Neuromuscular Estevan:        mins  01170;    Therapeutic Activity:          mins  97790;     Gait Training:           mins  84355;     Ultrasound:          mins  14902;    Ionto                                   mins   87221  Self Care                            mins   85164  Canalith Repos                   mins  4209  Aquatic                               mins 32574    Un-Timed:  Electrical Stimulation:         mins  29766 ( );  Dry Needling          mins self-pay  Traction          mins 41653  Low Eval          Mins  01420  Mod Eval          Mins  26056  High Eval                            Mins  89021  Re-Eval                               mins  11098    Timed Treatment:   43   mins   Total Treatment:     43   mins    Sussy Obando PT

## 2022-07-21 ENCOUNTER — TREATMENT (OUTPATIENT)
Dept: PHYSICAL THERAPY | Facility: CLINIC | Age: 67
End: 2022-07-21

## 2022-07-21 DIAGNOSIS — M25.562 ACUTE PAIN OF LEFT KNEE: ICD-10-CM

## 2022-07-21 DIAGNOSIS — M71.50 TRAUMATIC BURSITIS: Primary | ICD-10-CM

## 2022-07-21 DIAGNOSIS — S80.02XA CONTUSION OF LEFT KNEE, INITIAL ENCOUNTER: ICD-10-CM

## 2022-07-21 PROCEDURE — 97110 THERAPEUTIC EXERCISES: CPT | Performed by: PHYSICAL THERAPIST

## 2022-07-21 NOTE — PROGRESS NOTES
Physical Therapy Treatment Note    VISIT#: 9     Diagnosis Plan   1. Traumatic bursitis     2. Contusion of left knee, initial encounter     3. Acute pain of left knee       Subjective   Scarlet Gong reports: knee not been hurting.       Objective     See Exercise, Manual, and Modality Logs for complete treatment.     Cues to encourage heel strike during gait.    Assessment/Plan Pt demonstrating longer stride length, improved gait mechanics. Continues to tolerate slow progression of LE strengthening      Progress per Plan of Care Monitor and progress as tolerated. Resume hip IR/ER stretching.            Timed:         Manual Therapy:         mins  79576;     Therapeutic Exercise:    43     mins  60783;     Neuromuscular Estevan:        mins  72145;    Therapeutic Activity:          mins  29030;     Gait Training:           mins  58858;     Ultrasound:          mins  18866;    Ionto                                   mins   54892  Self Care                            mins   24863  Canalith Repos                   mins  4209  Aquatic                               mins 32473    Un-Timed:  Electrical Stimulation:         mins  24779 ( );  Dry Needling          mins self-pay  Traction          mins 29599  Low Eval          Mins  61396  Mod Eval          Mins  59327  High Eval                            Mins  51423  Re-Eval                               mins  05223    Timed Treatment:   43   mins   Total Treatment:     43   mins    Sussy Obando, PT

## 2022-07-25 ENCOUNTER — TREATMENT (OUTPATIENT)
Dept: PHYSICAL THERAPY | Facility: CLINIC | Age: 67
End: 2022-07-25

## 2022-07-25 DIAGNOSIS — S80.02XA CONTUSION OF LEFT KNEE, INITIAL ENCOUNTER: ICD-10-CM

## 2022-07-25 DIAGNOSIS — M25.562 ACUTE PAIN OF LEFT KNEE: ICD-10-CM

## 2022-07-25 DIAGNOSIS — M71.50 TRAUMATIC BURSITIS: Primary | ICD-10-CM

## 2022-07-25 PROCEDURE — 97110 THERAPEUTIC EXERCISES: CPT | Performed by: PHYSICAL THERAPIST

## 2022-07-25 NOTE — PROGRESS NOTES
Physical Therapy Treatment Note    VISIT#: 10     Diagnosis Plan   1. Traumatic bursitis     2. Contusion of left knee, initial encounter     3. Acute pain of left knee       Subjective   Scarlet Gong reports: no knee pain currently. Knee way better even at work.     Objective     See Exercise, Manual, and Modality Logs for complete treatment.       Assessment/Plan Pt demonstrating increased LE strength and increased functional tolerance. All exercises without increased pain.       Progress per Plan of Care Progress as tolerated. Trial stairs.            Timed:         Manual Therapy:         mins  97744;     Therapeutic Exercise:    40     mins  37376;     Neuromuscular Estevan:        mins  11171;    Therapeutic Activity:          mins  33098;     Gait Training:           mins  44665;     Ultrasound:          mins  59572;    Ionto                                   mins   57570  Self Care                            mins   16380  Canalith Repos                   mins  4209  Aquatic                               mins 28948    Un-Timed:  Electrical Stimulation:         mins  01129 ( );  Dry Needling          mins self-pay  Traction          mins 33928  Low Eval          Mins  05698  Mod Eval          Mins  07391  High Eval                            Mins  95131  Re-Eval                               mins  17274    Timed Treatment:   40   mins   Total Treatment:     40   mins    Sussy Obando, PT

## 2022-07-28 ENCOUNTER — OFFICE VISIT (OUTPATIENT)
Dept: ORTHOPEDIC SURGERY | Facility: CLINIC | Age: 67
End: 2022-07-28

## 2022-07-28 VITALS — HEIGHT: 63 IN | HEART RATE: 79 BPM | BODY MASS INDEX: 38.62 KG/M2 | WEIGHT: 218 LBS

## 2022-07-28 DIAGNOSIS — M25.562 ACUTE PAIN OF LEFT KNEE: Primary | ICD-10-CM

## 2022-07-28 PROCEDURE — 99212 OFFICE O/P EST SF 10 MIN: CPT | Performed by: ORTHOPAEDIC SURGERY

## 2022-07-28 RX ORDER — POTASSIUM CHLORIDE 20 MEQ/1
20 TABLET, EXTENDED RELEASE ORAL 2 TIMES DAILY
COMMUNITY
Start: 2022-07-05 | End: 2023-01-06

## 2022-07-28 RX ORDER — ALBUTEROL SULFATE 90 UG/1
2 AEROSOL, METERED RESPIRATORY (INHALATION) EVERY 6 HOURS PRN
COMMUNITY
Start: 2022-06-23

## 2022-07-28 RX ORDER — HYDRALAZINE HYDROCHLORIDE 50 MG/1
75 TABLET, FILM COATED ORAL 2 TIMES DAILY
COMMUNITY
Start: 2022-07-06

## 2022-07-28 RX ORDER — ASCORBIC ACID 500 MG
500 TABLET ORAL DAILY
COMMUNITY
Start: 2020-12-05

## 2022-07-28 RX ORDER — ZINC GLUCONATE 50 MG
50 TABLET ORAL DAILY
COMMUNITY
Start: 2020-12-05

## 2022-07-28 RX ORDER — HYDROCHLOROTHIAZIDE 50 MG/1
50 TABLET ORAL DAILY
COMMUNITY
Start: 2022-07-06

## 2022-07-28 RX ORDER — LEVOFLOXACIN 500 MG/1
TABLET, FILM COATED ORAL
COMMUNITY
Start: 2022-06-23 | End: 2023-01-28

## 2022-07-28 NOTE — PROGRESS NOTES
"     Patient ID: Scarlet Gong is a 67 y.o. female.  Left knee pain  Returns after injection and bracing began earlier this month pain completely resolved    Review of Systems:    Left knee pain resolved    Objective:    Pulse 79   Ht 160 cm (63\")   Wt 98.9 kg (218 lb)   BMI 38.62 kg/m²     Physical Examination:      Left knee demonstrates intact skin.  She has a residual skin change over the tibial tubercle but intact extensor mechanism.  No sign of infection.  Mild knee effusion.  Knee range of motion 0-1 25 no varus valgus laxity.  Lachman anterior posterior drawer negative.  Sensory and motor exam are intact in all distributions. Dorsalis pedis and posterior tibialis pulses are palpable and capillary refill is less than two seconds to all digits.    Imaging:       Assessment:    Resolved knee pain    Plan:   Activity as tolerated and see me as needed  No work restrictions as of tomorrow      Procedures          Disclaimer: Part of this note may be an electronic transcription/translation of spoken language to printed text using the Dragon Dictation System  "

## 2022-08-15 ENCOUNTER — DOCUMENTATION (OUTPATIENT)
Dept: PHYSICAL THERAPY | Facility: CLINIC | Age: 67
End: 2022-08-15

## 2022-08-15 NOTE — PROGRESS NOTES
Discharge Summary  Discharge Summary from Physical Therapy Report    Scarlet Gong  [birth year]      Dates  PT visit: 5/16/22 to 7/25/22  Number of Visits: 10    Discharge Status of Patient: Pt reported to clinic that she is ready for D/C. Has been cleared by MD.    Goals: Partially Met    Discharge Plan: Continue with current home exercise program as instructed    Comments Unable to formally reassess. Pt 0/10 at last appt and demonstrating good tolerance to therapy and increasing LE strength and function.     Date of Discharge 8/15/22      Sussy Obando, PT  Physical Therapist

## 2022-10-18 ENCOUNTER — HOSPITAL ENCOUNTER (EMERGENCY)
Facility: HOSPITAL | Age: 67
Discharge: HOME | End: 2022-10-18
Attending: EMERGENCY MEDICINE | Admitting: EMERGENCY MEDICINE

## 2022-10-18 ENCOUNTER — APPOINTMENT (OUTPATIENT)
Dept: GENERAL RADIOLOGY | Facility: HOSPITAL | Age: 67
End: 2022-10-18

## 2022-10-18 VITALS
HEIGHT: 62 IN | BODY MASS INDEX: 39.92 KG/M2 | SYSTOLIC BLOOD PRESSURE: 127 MMHG | TEMPERATURE: 97.7 F | DIASTOLIC BLOOD PRESSURE: 55 MMHG | HEART RATE: 89 BPM | WEIGHT: 216.93 LBS | OXYGEN SATURATION: 98 % | RESPIRATION RATE: 22 BRPM

## 2022-10-18 DIAGNOSIS — J20.6 ACUTE BRONCHITIS DUE TO RHINOVIRUS: Primary | ICD-10-CM

## 2022-10-18 DIAGNOSIS — J45.901 MODERATE ASTHMA WITH EXACERBATION, UNSPECIFIED WHETHER PERSISTENT: ICD-10-CM

## 2022-10-18 LAB
ALBUMIN SERPL-MCNC: 4.2 G/DL (ref 3.5–5.2)
ALBUMIN/GLOB SERPL: 1.4 G/DL
ALP SERPL-CCNC: 76 U/L (ref 39–117)
ALT SERPL W P-5'-P-CCNC: 16 U/L (ref 1–33)
ANION GAP SERPL CALCULATED.3IONS-SCNC: 14 MMOL/L (ref 5–15)
AST SERPL-CCNC: 17 U/L (ref 1–32)
B PARAPERT DNA SPEC QL NAA+PROBE: NOT DETECTED
B PERT DNA SPEC QL NAA+PROBE: NOT DETECTED
BASOPHILS # BLD AUTO: 0 10*3/MM3 (ref 0–0.2)
BASOPHILS NFR BLD AUTO: 0.3 % (ref 0–1.5)
BILIRUB SERPL-MCNC: 0.2 MG/DL (ref 0–1.2)
BUN SERPL-MCNC: 19 MG/DL (ref 8–23)
BUN/CREAT SERPL: 29.2 (ref 7–25)
C PNEUM DNA NPH QL NAA+NON-PROBE: NOT DETECTED
CALCIUM SPEC-SCNC: 9.5 MG/DL (ref 8.6–10.5)
CHLORIDE SERPL-SCNC: 98 MMOL/L (ref 98–107)
CO2 SERPL-SCNC: 27 MMOL/L (ref 22–29)
CREAT SERPL-MCNC: 0.65 MG/DL (ref 0.57–1)
D DIMER PPP FEU-MCNC: 0.62 MG/L (FEU) (ref 0–0.59)
D-LACTATE SERPL-SCNC: 1.6 MMOL/L (ref 0.5–2)
D-LACTATE SERPL-SCNC: 2.3 MMOL/L (ref 0.5–2)
DEPRECATED RDW RBC AUTO: 44.2 FL (ref 37–54)
EGFRCR SERPLBLD CKD-EPI 2021: 97.2 ML/MIN/1.73
EOSINOPHIL # BLD AUTO: 0.3 10*3/MM3 (ref 0–0.4)
EOSINOPHIL NFR BLD AUTO: 2.5 % (ref 0.3–6.2)
ERYTHROCYTE [DISTWIDTH] IN BLOOD BY AUTOMATED COUNT: 14.5 % (ref 12.3–15.4)
FLUAV SUBTYP SPEC NAA+PROBE: NOT DETECTED
FLUBV RNA ISLT QL NAA+PROBE: NOT DETECTED
GLOBULIN UR ELPH-MCNC: 3 GM/DL
GLUCOSE SERPL-MCNC: 138 MG/DL (ref 65–99)
HADV DNA SPEC NAA+PROBE: NOT DETECTED
HCOV 229E RNA SPEC QL NAA+PROBE: NOT DETECTED
HCOV HKU1 RNA SPEC QL NAA+PROBE: NOT DETECTED
HCOV NL63 RNA SPEC QL NAA+PROBE: NOT DETECTED
HCOV OC43 RNA SPEC QL NAA+PROBE: NOT DETECTED
HCT VFR BLD AUTO: 39 % (ref 34–46.6)
HGB BLD-MCNC: 13.1 G/DL (ref 12–15.9)
HMPV RNA NPH QL NAA+NON-PROBE: NOT DETECTED
HOLD SPECIMEN: NORMAL
HOLD SPECIMEN: NORMAL
HPIV1 RNA ISLT QL NAA+PROBE: NOT DETECTED
HPIV2 RNA SPEC QL NAA+PROBE: NOT DETECTED
HPIV3 RNA NPH QL NAA+PROBE: NOT DETECTED
HPIV4 P GENE NPH QL NAA+PROBE: NOT DETECTED
LYMPHOCYTES # BLD AUTO: 1.7 10*3/MM3 (ref 0.7–3.1)
LYMPHOCYTES NFR BLD AUTO: 15.1 % (ref 19.6–45.3)
M PNEUMO IGG SER IA-ACNC: NOT DETECTED
MCH RBC QN AUTO: 29.1 PG (ref 26.6–33)
MCHC RBC AUTO-ENTMCNC: 33.6 G/DL (ref 31.5–35.7)
MCV RBC AUTO: 86.8 FL (ref 79–97)
MONOCYTES # BLD AUTO: 0.8 10*3/MM3 (ref 0.1–0.9)
MONOCYTES NFR BLD AUTO: 7.1 % (ref 5–12)
NEUTROPHILS NFR BLD AUTO: 75 % (ref 42.7–76)
NEUTROPHILS NFR BLD AUTO: 8.4 10*3/MM3 (ref 1.7–7)
NRBC BLD AUTO-RTO: 0.2 /100 WBC (ref 0–0.2)
PLATELET # BLD AUTO: 322 10*3/MM3 (ref 140–450)
PMV BLD AUTO: 6.9 FL (ref 6–12)
POTASSIUM SERPL-SCNC: 3 MMOL/L (ref 3.5–5.2)
PROCALCITONIN SERPL-MCNC: 0.06 NG/ML (ref 0–0.25)
PROT SERPL-MCNC: 7.2 G/DL (ref 6–8.5)
RBC # BLD AUTO: 4.5 10*6/MM3 (ref 3.77–5.28)
RHINOVIRUS RNA SPEC NAA+PROBE: DETECTED
RSV RNA NPH QL NAA+NON-PROBE: NOT DETECTED
SARS-COV-2 RNA NPH QL NAA+NON-PROBE: NOT DETECTED
SODIUM SERPL-SCNC: 139 MMOL/L (ref 136–145)
TROPONIN T SERPL-MCNC: <0.01 NG/ML (ref 0–0.03)
WBC NRBC COR # BLD: 11.2 10*3/MM3 (ref 3.4–10.8)
WHOLE BLOOD HOLD COAG: NORMAL
WHOLE BLOOD HOLD SPECIMEN: NORMAL

## 2022-10-18 PROCEDURE — 80053 COMPREHEN METABOLIC PANEL: CPT | Performed by: EMERGENCY MEDICINE

## 2022-10-18 PROCEDURE — 93005 ELECTROCARDIOGRAM TRACING: CPT | Performed by: EMERGENCY MEDICINE

## 2022-10-18 PROCEDURE — 85379 FIBRIN DEGRADATION QUANT: CPT | Performed by: EMERGENCY MEDICINE

## 2022-10-18 PROCEDURE — 94799 UNLISTED PULMONARY SVC/PX: CPT

## 2022-10-18 PROCEDURE — 96374 THER/PROPH/DIAG INJ IV PUSH: CPT

## 2022-10-18 PROCEDURE — 85025 COMPLETE CBC W/AUTO DIFF WBC: CPT | Performed by: EMERGENCY MEDICINE

## 2022-10-18 PROCEDURE — 25010000002 DEXAMETHASONE PER 1 MG: Performed by: EMERGENCY MEDICINE

## 2022-10-18 PROCEDURE — 83605 ASSAY OF LACTIC ACID: CPT

## 2022-10-18 PROCEDURE — 71045 X-RAY EXAM CHEST 1 VIEW: CPT

## 2022-10-18 PROCEDURE — 99284 EMERGENCY DEPT VISIT MOD MDM: CPT

## 2022-10-18 PROCEDURE — 94761 N-INVAS EAR/PLS OXIMETRY MLT: CPT

## 2022-10-18 PROCEDURE — 84484 ASSAY OF TROPONIN QUANT: CPT | Performed by: EMERGENCY MEDICINE

## 2022-10-18 PROCEDURE — 84145 PROCALCITONIN (PCT): CPT | Performed by: EMERGENCY MEDICINE

## 2022-10-18 PROCEDURE — 94640 AIRWAY INHALATION TREATMENT: CPT

## 2022-10-18 PROCEDURE — 0202U NFCT DS 22 TRGT SARS-COV-2: CPT | Performed by: EMERGENCY MEDICINE

## 2022-10-18 PROCEDURE — 87040 BLOOD CULTURE FOR BACTERIA: CPT | Performed by: EMERGENCY MEDICINE

## 2022-10-18 RX ORDER — DEXAMETHASONE SODIUM PHOSPHATE 4 MG/ML
10 INJECTION, SOLUTION INTRA-ARTICULAR; INTRALESIONAL; INTRAMUSCULAR; INTRAVENOUS; SOFT TISSUE ONCE
Status: COMPLETED | OUTPATIENT
Start: 2022-10-18 | End: 2022-10-18

## 2022-10-18 RX ORDER — SODIUM CHLORIDE 0.9 % (FLUSH) 0.9 %
10 SYRINGE (ML) INJECTION AS NEEDED
Status: DISCONTINUED | OUTPATIENT
Start: 2022-10-18 | End: 2022-10-18 | Stop reason: HOSPADM

## 2022-10-18 RX ORDER — POTASSIUM CHLORIDE 20 MEQ/1
40 TABLET, EXTENDED RELEASE ORAL ONCE
Status: COMPLETED | OUTPATIENT
Start: 2022-10-18 | End: 2022-10-18

## 2022-10-18 RX ORDER — ACETAMINOPHEN 500 MG
1000 TABLET ORAL ONCE
Status: COMPLETED | OUTPATIENT
Start: 2022-10-18 | End: 2022-10-18

## 2022-10-18 RX ORDER — ALBUTEROL SULFATE 2.5 MG/3ML
2.5 SOLUTION RESPIRATORY (INHALATION) ONCE
Status: COMPLETED | OUTPATIENT
Start: 2022-10-18 | End: 2022-10-18

## 2022-10-18 RX ADMIN — ACETAMINOPHEN 1000 MG: 500 TABLET ORAL at 05:19

## 2022-10-18 RX ADMIN — POTASSIUM CHLORIDE 40 MEQ: 1500 TABLET, EXTENDED RELEASE ORAL at 06:37

## 2022-10-18 RX ADMIN — DEXAMETHASONE SODIUM PHOSPHATE 10 MG: 4 INJECTION, SOLUTION INTRAMUSCULAR; INTRAVENOUS at 06:37

## 2022-10-18 RX ADMIN — ALBUTEROL SULFATE 2.5 MG: 2.5 SOLUTION RESPIRATORY (INHALATION) at 06:28

## 2022-10-18 NOTE — ED PROVIDER NOTES
"Subjective   History of Present Illness  67-year-old female with history of asthma complains of onset Saturday of nonproductive cough, subjective fever and chills, moderate associated with left shoulder and chest discomfort.  Patient states that shoulder and chest discomfort have been a chronic phenomenon and she has had negative cardiac testing for this.  No history of DVT PE, no calf.  No recent trips or travel.        Review of Systems   Respiratory: Positive for cough and shortness of breath.    Cardiovascular: Positive for chest pain.   Musculoskeletal:        As per HPI   All other systems reviewed and are negative.      Past Medical History:   Diagnosis Date   • Asthma 04/27/2016   • Hyperlipidemia 03/02/2020   • Hypertension 03/02/2020   • Obesity (BMI 30-39.9) 03/02/2020   • Osteopenia 03/02/2020       Allergies   Allergen Reactions   • Prednisone Rash   • Atorvastatin Itching   • Lisinopril Cough   • Azithromycin Rash     Re-entered from allergy \"Z-Pack\"       Past Surgical History:   Procedure Laterality Date   • CARDIAC CATHETERIZATION N/A 03/04/2020    Procedure: Left Heart Cath and coronary angiogram;  Surgeon: Hien Camacho MD;  Location: [facility] CATH INVASIVE LOCATION;  Service: Cardiovascular;  Laterality: N/A;   • CARDIAC CATHETERIZATION N/A 03/04/2020    Procedure: Left ventriculography;  Surgeon: Hien Camacho MD;  Location: [facility] CATH INVASIVE LOCATION;  Service: Cardiovascular;  Laterality: N/A;       Family History   Problem Relation Age of Onset   • Hypertension Mother    • Heart disease Sister    • Heart disease Brother    • Colon cancer Maternal Grandmother        Social History     Socioeconomic History   • Marital status:    Tobacco Use   • Smoking status: Former     Packs/day: 1.50     Years: 32.00     Pack years: 48.00     Types: Cigarettes   Vaping Use   • Vaping Use: Never used   Substance and Sexual Activity   • Alcohol use: Never   • Drug use: Never   • Sexual " activity: Defer           Objective   Physical Exam  Constitutional:       Appearance: She is well-developed.   HENT:      Head: Normocephalic and atraumatic.      Mouth/Throat:      Mouth: Mucous membranes are moist.      Pharynx: Oropharynx is clear.   Eyes:      Conjunctiva/sclera: Conjunctivae normal.      Pupils: Pupils are equal, round, and reactive to light.   Cardiovascular:      Rate and Rhythm: Tachycardia present.      Heart sounds: Normal heart sounds.   Pulmonary:      Effort: Pulmonary effort is normal.      Comments: Frequent cough, scant rhonchi and wheezes  Abdominal:      General: Bowel sounds are normal. There is no distension.      Palpations: Abdomen is soft.      Tenderness: There is no abdominal tenderness.   Musculoskeletal:         General: No swelling or tenderness. Normal range of motion.   Skin:     General: Skin is warm and dry.      Capillary Refill: Capillary refill takes less than 2 seconds.   Neurological:      General: No focal deficit present.      Mental Status: She is alert and oriented to person, place, and time.   Psychiatric:         Mood and Affect: Mood normal.         Behavior: Behavior normal.         Procedures           ED Course  ED Course as of 10/18/22 0703   Tue Oct 18, 2022   0425 EKG interpretation: Sinus tachycardia, rate 108, no acute ST change [JR]      ED Course User Index  [JR] Garcia Morgan MD                                           MDM  Number of Diagnoses or Management Options  Acute bronchitis due to Rhinovirus  Moderate asthma with exacerbation, unspecified whether persistent  Diagnosis management comments: Results for orders placed or performed during the hospital encounter of 10/18/22  -Respiratory Panel PCR w/COVID-19(SARS-CoV-2) ARMANDO/RICHARD/ERASMO/PAD/COR/MAD/TAYLA In-House, NP Swab in UTM/VTM, 3-4 HR TAT - Swab, Nasopharynx:   Specimen: Nasopharynx; Swab       Result                      Value             Ref Range           ADENOVIRUS, PCR              Not Detected      Not Detected        Coronavirus 229E            Not Detected      Not Detected        Coronavirus HKU1            Not Detected      Not Detected        Coronavirus NL63            Not Detected      Not Detected        Coronavirus OC43            Not Detected      Not Detected        COVID19                     Not Detected      Not Detected*       Human Metapneumovirus       Not Detected      Not Detected        Human Rhinovirus/Enter*     Detected (A)      Not Detected        Influenza A PCR             Not Detected      Not Detected        Influenza B PCR             Not Detected      Not Detected        Parainfluenza Virus 1       Not Detected      Not Detected        Parainfluenza Virus 2       Not Detected      Not Detected        Parainfluenza Virus 3       Not Detected      Not Detected        Parainfluenza Virus 4       Not Detected      Not Detected        RSV, PCR                    Not Detected      Not Detected        Bordetella pertussis p*     Not Detected      Not Detected        Bordetella parapertuss*     Not Detected      Not Detected        Chlamydophila pneumoni*     Not Detected      Not Detected        Mycoplasma pneumo by P*     Not Detected      Not Detected   -Comprehensive Metabolic Panel:   Specimen: Blood       Result                      Value             Ref Range           Glucose                     138 (H)           65 - 99 mg/dL       BUN                         19                8 - 23 mg/dL        Creatinine                  0.65              0.57 - 1.00 *       Sodium                      139               136 - 145 mm*       Potassium                   3.0 (L)           3.5 - 5.2 mm*       Chloride                    98                98 - 107 mmo*       CO2                         27.0              22.0 - 29.0 *       Calcium                     9.5               8.6 - 10.5 m*       Total Protein               7.2               6.0 - 8.5 g/*       Albumin                      4.20              3.50 - 5.20 *       ALT (SGPT)                  16                1 - 33 U/L          AST (SGOT)                  17                1 - 32 U/L          Alkaline Phosphatase        76                39 - 117 U/L        Total Bilirubin             0.2               0.0 - 1.2 mg*       Globulin                    3.0               gm/dL               A/G Ratio                   1.4               g/dL                BUN/Creatinine Ratio        29.2 (H)          7.0 - 25.0          Anion Gap                   14.0              5.0 - 15.0 m*       eGFR                        97.2              >60.0 mL/min*  -CBC Auto Differential:   Specimen: Blood       Result                      Value             Ref Range           WBC                         11.20 (H)         3.40 - 10.80*       RBC                         4.50              3.77 - 5.28 *       Hemoglobin                  13.1              12.0 - 15.9 *       Hematocrit                  39.0              34.0 - 46.6 %       MCV                         86.8              79.0 - 97.0 *       MCH                         29.1              26.6 - 33.0 *       MCHC                        33.6              31.5 - 35.7 *       RDW                         14.5              12.3 - 15.4 %       RDW-SD                      44.2              37.0 - 54.0 *       MPV                         6.9               6.0 - 12.0 fL       Platelets                   322               140 - 450 10*       Neutrophil %                75.0              42.7 - 76.0 %       Lymphocyte %                15.1 (L)          19.6 - 45.3 %       Monocyte %                  7.1               5.0 - 12.0 %        Eosinophil %                2.5               0.3 - 6.2 %         Basophil %                  0.3               0.0 - 1.5 %         Neutrophils, Absolute       8.40 (H)          1.70 - 7.00 *       Lymphocytes, Absolute       1.70              0.70 - 3.10 *        Monocytes, Absolute         0.80              0.10 - 0.90 *       Eosinophils, Absolute       0.30              0.00 - 0.40 *       Basophils, Absolute         0.00              0.00 - 0.20 *       nRBC                        0.2               0.0 - 0.2 /1*  -Procalcitonin:   Specimen: Blood       Result                      Value             Ref Range           Procalcitonin               0.06              0.00 - 0.25 *  -D-dimer, Quantitative:   Specimen: Blood       Result                      Value             Ref Range           D-Dimer, Quantitative       0.62 (H)          0.00 - 0.59 *  -Troponin:   Specimen: Blood       Result                      Value             Ref Range           Troponin T                  <0.010            0.000 - 0.03*  -POC Lactate:   Specimen: Blood       Result                      Value             Ref Range           Lactate                     2.3 (C)           0.5 - 2.0 mm*  -POC Lactate:   Specimen: Blood       Result                      Value             Ref Range           Lactate                     1.6               0.5 - 2.0 mm*  -ECG 12 Lead:        Result                      Value             Ref Range           QT Interval                 335               ms             -Green Top (Gel):        Result                      Value             Ref Range           Extra Tube                  done                             -Lavender Top:        Result                      Value             Ref Range           Extra Tube                                                    hold for add-on  -Gold Top - SST:        Result                      Value             Ref Range           Extra Tube                                                    Hold for add-ons.  -Light Blue Top:        Result                      Value             Ref Range           Extra Tube                                                    Hold for add-ons.    Patient with rhinovirus causing asthma  exacerbation and chest soreness, unremarkable work-up otherwise the exception of low potassium, replaced orally, return precautions reviewed.  Patient has an issue with oral steroids causing a severe red rash that she does well with IV steroids, 1 dose of dexamethasone given.  Patient encouraged to use her breathing treatment every 4 hours while awake for the next 48 hours and return if worse at any time.       Amount and/or Complexity of Data Reviewed  Clinical lab tests: ordered and reviewed  Tests in the radiology section of CPT®: ordered and reviewed  Tests in the medicine section of CPT®: reviewed and ordered  Independent visualization of images, tracings, or specimens: yes        Final diagnoses:   Acute bronchitis due to Rhinovirus   Moderate asthma with exacerbation, unspecified whether persistent       ED Disposition  ED Disposition     ED Disposition   Discharge    Condition   Stable    Comment   --             Garcia Real MD  [address] [facility] [place] [address] [phone]    Schedule an appointment as soon as possible for a visit            Medication List      No changes were made to your prescriptions during this visit.          Garcia Morgan MD  10/18/22 0703

## 2022-10-18 NOTE — ED NOTES
Pt states Saturday started with H/A, cp and soa and this am pt states she woke up this am feeling really soa chest pain and left shoulder pain pt A&O x 4 VSS

## 2022-10-23 LAB
BACTERIA SPEC AEROBE CULT: NORMAL
BACTERIA SPEC AEROBE CULT: NORMAL

## 2022-10-27 LAB — QT INTERVAL: 335 MS

## 2023-01-06 ENCOUNTER — OFFICE VISIT (OUTPATIENT)
Dept: FAMILY MEDICINE CLINIC | Facility: CLINIC | Age: 68
End: 2023-01-06
Payer: MEDICARE

## 2023-01-06 VITALS
HEART RATE: 90 BPM | OXYGEN SATURATION: 97 % | SYSTOLIC BLOOD PRESSURE: 130 MMHG | RESPIRATION RATE: 18 BRPM | DIASTOLIC BLOOD PRESSURE: 80 MMHG | BODY MASS INDEX: 39.87 KG/M2 | WEIGHT: 218 LBS

## 2023-01-06 DIAGNOSIS — Z76.89 ENCOUNTER TO ESTABLISH CARE: Primary | ICD-10-CM

## 2023-01-06 DIAGNOSIS — Z12.31 ENCOUNTER FOR SCREENING MAMMOGRAM FOR MALIGNANT NEOPLASM OF BREAST: ICD-10-CM

## 2023-01-06 PROBLEM — G47.39 OTHER SLEEP APNEA: Status: ACTIVE | Noted: 2023-01-06

## 2023-01-06 PROCEDURE — 99213 OFFICE O/P EST LOW 20 MIN: CPT | Performed by: NURSE PRACTITIONER

## 2023-01-06 NOTE — PROGRESS NOTES
Chief Complaint  Establish Care, Rash, and Knee Pain    Subjective          Scarlet Gong presents to [facility]  History of Present Illness    Is a new patient, here today to establish care  Has been seeing a DrKathy [place] - she has moved to [place]    Has h/o hyperlipidemia, htn, obesity, osteopenia, and asthma    Asthma is mild intermittent, uses a rescue inhaler as needed    Has seen a pulmonologist (Dr. Fontana) for shortness of breath and is recently diagnosed with sleep apnea and has a cpap on the way  Has had cardiac work-up completed    Has had several colonoscopies at [facility]    BP is managed with hydralazine 75mg twice daily - takes 1.5 of the 50mg tabs bid; hydrochlorothiazide 50mg qd  She also takes an otc potassium supplement    She does not needs any medicines refilled today    She is uncertain as to where the colonoscopy was done, she thinks either [facility] or [facility]     Review of Systems   Constitutional: Positive for fatigue. Negative for appetite change and fever.   Respiratory: Positive for apnea, chest tightness and shortness of breath. Negative for cough and wheezing.         Is seeing pulmonologist and has been diagnosed as of yesterday for sleep apnea   Cardiovascular: Negative for chest pain, palpitations and leg swelling.   Gastrointestinal: Negative.  Negative for abdominal pain, blood in stool, constipation, diarrhea, nausea and vomiting.   Genitourinary: Negative.  Negative for dysuria, frequency and urgency.   Musculoskeletal: Positive for arthralgias.        Endorses arthritis in her hips and knees, has had surgery on the left knee per Dr. Gomez     Psychiatric/Behavioral: Positive for sleep disturbance. Negative for dysphoric mood. The patient is not nervous/anxious.         New dx of sleep apnea       Objective   Vital Signs:  /80 (BP Location: Right arm, Patient Position: Sitting)   Pulse 90   Resp 18   Wt 98.9 kg (218 lb)    SpO2 97%   BMI 39.87 kg/m²     BP Readings from Last 3 Encounters:   01/06/23 130/80   10/18/22 127/55   06/30/22 146/69        Wt Readings from Last 3 Encounters:   01/06/23 98.9 kg (218 lb)   10/18/22 98.4 kg (216 lb 14.9 oz)   07/28/22 98.9 kg (218 lb)              Physical Exam  Vitals reviewed.   Constitutional:       Appearance: Normal appearance.   Neck:      Vascular: No carotid bruit.   Cardiovascular:      Rate and Rhythm: Normal rate and regular rhythm.      Pulses: Normal pulses.      Heart sounds: Normal heart sounds.   Pulmonary:      Effort: Pulmonary effort is normal.      Breath sounds: Normal breath sounds.   Musculoskeletal:      Cervical back: Neck supple.      Right lower leg: No edema.      Left lower leg: No edema.   Skin:     General: Skin is warm.   Neurological:      Mental Status: She is alert and oriented to person, place, and time.        Result Review :     CMP    CMP 10/18/22   Glucose 138 (A)   BUN 19   Creatinine 0.65   eGFR 97.2   Sodium 139   Potassium 3.0 (A)   Chloride 98   Calcium 9.5   Total Protein 7.2   Albumin 4.20   Globulin 3.0   Total Bilirubin 0.2   Alkaline Phosphatase 76   AST (SGOT) 17   ALT (SGPT) 16   Albumin/Globulin Ratio 1.4   BUN/Creatinine Ratio 29.2 (A)   Anion Gap 14.0   (A) Abnormal value       Comments are available for some flowsheets but are not being displayed.           CBC    CBC 10/18/22   WBC 11.20 (A)   RBC 4.50   Hemoglobin 13.1   Hematocrit 39.0   MCV 86.8   MCH 29.1   MCHC 33.6   RDW 14.5   Platelets 322   (A) Abnormal value                      Assessment and Plan    Diagnoses and all orders for this visit:    1. Encounter to establish care (Primary)    2. Encounter for screening mammogram for malignant neoplasm of breast  -     Mammo Screening Digital Tomosynthesis Bilateral With CAD; Future             Follow Up   Return in about 4 months (around 5/8/2023) for Annual physical, recheck BP.  Patient was given instructions and counseling  regarding her condition or for health maintenance advice. Please see specific information pulled into the AVS if appropriate.

## 2023-01-28 ENCOUNTER — HOSPITAL ENCOUNTER (OUTPATIENT)
Facility: HOSPITAL | Age: 68
Discharge: HOME | End: 2023-01-30
Attending: EMERGENCY MEDICINE | Admitting: INTERNAL MEDICINE
Payer: MEDICARE

## 2023-01-28 ENCOUNTER — APPOINTMENT (OUTPATIENT)
Dept: GENERAL RADIOLOGY | Facility: HOSPITAL | Age: 68
End: 2023-01-28
Payer: MEDICARE

## 2023-01-28 DIAGNOSIS — R07.89 CHEST DISCOMFORT: Primary | ICD-10-CM

## 2023-01-28 DIAGNOSIS — R07.9 CHEST PAIN, UNSPECIFIED TYPE: ICD-10-CM

## 2023-01-28 DIAGNOSIS — R06.02 SHORTNESS OF BREATH: ICD-10-CM

## 2023-01-28 DIAGNOSIS — E78.2 MIXED HYPERLIPIDEMIA: ICD-10-CM

## 2023-01-28 DIAGNOSIS — I10 ESSENTIAL HYPERTENSION: ICD-10-CM

## 2023-01-28 LAB
ALBUMIN SERPL-MCNC: 4.2 G/DL (ref 3.5–5.2)
ALBUMIN/GLOB SERPL: 1.5 G/DL
ALP SERPL-CCNC: 87 U/L (ref 39–117)
ALT SERPL W P-5'-P-CCNC: 22 U/L (ref 1–33)
ANION GAP SERPL CALCULATED.3IONS-SCNC: 11 MMOL/L (ref 5–15)
APTT PPP: 28.4 SECONDS (ref 24–31)
AST SERPL-CCNC: 22 U/L (ref 1–32)
B PARAPERT DNA SPEC QL NAA+PROBE: NOT DETECTED
B PERT DNA SPEC QL NAA+PROBE: NOT DETECTED
BASOPHILS # BLD AUTO: 0 10*3/MM3 (ref 0–0.2)
BASOPHILS NFR BLD AUTO: 0.4 % (ref 0–1.5)
BILIRUB SERPL-MCNC: 0.3 MG/DL (ref 0–1.2)
BUN SERPL-MCNC: 18 MG/DL (ref 8–23)
BUN/CREAT SERPL: 24.7 (ref 7–25)
C PNEUM DNA NPH QL NAA+NON-PROBE: NOT DETECTED
CALCIUM SPEC-SCNC: 9.9 MG/DL (ref 8.6–10.5)
CHLORIDE SERPL-SCNC: 102 MMOL/L (ref 98–107)
CHOLEST SERPL-MCNC: 168 MG/DL (ref 0–200)
CO2 SERPL-SCNC: 27 MMOL/L (ref 22–29)
CREAT SERPL-MCNC: 0.73 MG/DL (ref 0.57–1)
D DIMER PPP FEU-MCNC: 0.63 MG/L (FEU) (ref 0–0.66)
DEPRECATED RDW RBC AUTO: 45.5 FL (ref 37–54)
EGFRCR SERPLBLD CKD-EPI 2021: 90.8 ML/MIN/1.73
EOSINOPHIL # BLD AUTO: 0.2 10*3/MM3 (ref 0–0.4)
EOSINOPHIL NFR BLD AUTO: 2.2 % (ref 0.3–6.2)
ERYTHROCYTE [DISTWIDTH] IN BLOOD BY AUTOMATED COUNT: 14.7 % (ref 12.3–15.4)
FLUAV SUBTYP SPEC NAA+PROBE: NOT DETECTED
FLUBV RNA ISLT QL NAA+PROBE: NOT DETECTED
GLOBULIN UR ELPH-MCNC: 2.8 GM/DL
GLUCOSE SERPL-MCNC: 121 MG/DL (ref 65–99)
HADV DNA SPEC NAA+PROBE: NOT DETECTED
HCOV 229E RNA SPEC QL NAA+PROBE: NOT DETECTED
HCOV HKU1 RNA SPEC QL NAA+PROBE: NOT DETECTED
HCOV NL63 RNA SPEC QL NAA+PROBE: NOT DETECTED
HCOV OC43 RNA SPEC QL NAA+PROBE: NOT DETECTED
HCT VFR BLD AUTO: 40.9 % (ref 34–46.6)
HDLC SERPL-MCNC: 32 MG/DL (ref 40–60)
HGB BLD-MCNC: 13.2 G/DL (ref 12–15.9)
HMPV RNA NPH QL NAA+NON-PROBE: NOT DETECTED
HPIV1 RNA ISLT QL NAA+PROBE: NOT DETECTED
HPIV2 RNA SPEC QL NAA+PROBE: NOT DETECTED
HPIV3 RNA NPH QL NAA+PROBE: NOT DETECTED
HPIV4 P GENE NPH QL NAA+PROBE: NOT DETECTED
INR PPP: 1.04 (ref 0.93–1.1)
LDLC SERPL CALC-MCNC: 100 MG/DL (ref 0–100)
LDLC/HDLC SERPL: 2.94 {RATIO}
LYMPHOCYTES # BLD AUTO: 2.3 10*3/MM3 (ref 0.7–3.1)
LYMPHOCYTES NFR BLD AUTO: 23.7 % (ref 19.6–45.3)
M PNEUMO IGG SER IA-ACNC: NOT DETECTED
MAGNESIUM SERPL-MCNC: 1.8 MG/DL (ref 1.6–2.4)
MCH RBC QN AUTO: 28.6 PG (ref 26.6–33)
MCHC RBC AUTO-ENTMCNC: 32.2 G/DL (ref 31.5–35.7)
MCV RBC AUTO: 88.8 FL (ref 79–97)
MONOCYTES # BLD AUTO: 0.5 10*3/MM3 (ref 0.1–0.9)
MONOCYTES NFR BLD AUTO: 5.5 % (ref 5–12)
NEUTROPHILS NFR BLD AUTO: 6.6 10*3/MM3 (ref 1.7–7)
NEUTROPHILS NFR BLD AUTO: 68.2 % (ref 42.7–76)
NRBC BLD AUTO-RTO: 0.1 /100 WBC (ref 0–0.2)
NT-PROBNP SERPL-MCNC: 71.5 PG/ML (ref 0–900)
PLATELET # BLD AUTO: 349 10*3/MM3 (ref 140–450)
PMV BLD AUTO: 7.4 FL (ref 6–12)
POTASSIUM SERPL-SCNC: 3.5 MMOL/L (ref 3.5–5.2)
PROT SERPL-MCNC: 7 G/DL (ref 6–8.5)
PROTHROMBIN TIME: 10.7 SECONDS (ref 9.6–11.7)
RBC # BLD AUTO: 4.61 10*6/MM3 (ref 3.77–5.28)
RHINOVIRUS RNA SPEC NAA+PROBE: NOT DETECTED
RSV RNA NPH QL NAA+NON-PROBE: NOT DETECTED
SARS-COV-2 RNA NPH QL NAA+NON-PROBE: NOT DETECTED
SODIUM SERPL-SCNC: 140 MMOL/L (ref 136–145)
TRIGL SERPL-MCNC: 210 MG/DL (ref 0–150)
TROPONIN T SERPL-MCNC: <0.01 NG/ML (ref 0–0.03)
TROPONIN T SERPL-MCNC: <0.01 NG/ML (ref 0–0.03)
VLDLC SERPL-MCNC: 36 MG/DL (ref 5–40)
WBC NRBC COR # BLD: 9.7 10*3/MM3 (ref 3.4–10.8)

## 2023-01-28 PROCEDURE — 36415 COLL VENOUS BLD VENIPUNCTURE: CPT

## 2023-01-28 PROCEDURE — 85025 COMPLETE CBC W/AUTO DIFF WBC: CPT | Performed by: NURSE PRACTITIONER

## 2023-01-28 PROCEDURE — 80053 COMPREHEN METABOLIC PANEL: CPT | Performed by: NURSE PRACTITIONER

## 2023-01-28 PROCEDURE — 83880 ASSAY OF NATRIURETIC PEPTIDE: CPT | Performed by: NURSE PRACTITIONER

## 2023-01-28 PROCEDURE — 93010 ELECTROCARDIOGRAM REPORT: CPT | Performed by: INTERNAL MEDICINE

## 2023-01-28 PROCEDURE — 99284 EMERGENCY DEPT VISIT MOD MDM: CPT

## 2023-01-28 PROCEDURE — G0378 HOSPITAL OBSERVATION PER HR: HCPCS

## 2023-01-28 PROCEDURE — 85379 FIBRIN DEGRADATION QUANT: CPT | Performed by: NURSE PRACTITIONER

## 2023-01-28 PROCEDURE — 83735 ASSAY OF MAGNESIUM: CPT | Performed by: EMERGENCY MEDICINE

## 2023-01-28 PROCEDURE — 71045 X-RAY EXAM CHEST 1 VIEW: CPT

## 2023-01-28 PROCEDURE — 93005 ELECTROCARDIOGRAM TRACING: CPT | Performed by: EMERGENCY MEDICINE

## 2023-01-28 PROCEDURE — 25010000002 ENOXAPARIN PER 10 MG: Performed by: PHYSICIAN ASSISTANT

## 2023-01-28 PROCEDURE — 84484 ASSAY OF TROPONIN QUANT: CPT | Performed by: PHYSICIAN ASSISTANT

## 2023-01-28 PROCEDURE — 96372 THER/PROPH/DIAG INJ SC/IM: CPT

## 2023-01-28 PROCEDURE — 0202U NFCT DS 22 TRGT SARS-COV-2: CPT | Performed by: NURSE PRACTITIONER

## 2023-01-28 PROCEDURE — 85610 PROTHROMBIN TIME: CPT | Performed by: NURSE PRACTITIONER

## 2023-01-28 PROCEDURE — 80061 LIPID PANEL: CPT | Performed by: PHYSICIAN ASSISTANT

## 2023-01-28 PROCEDURE — 84484 ASSAY OF TROPONIN QUANT: CPT | Performed by: NURSE PRACTITIONER

## 2023-01-28 PROCEDURE — 85730 THROMBOPLASTIN TIME PARTIAL: CPT | Performed by: NURSE PRACTITIONER

## 2023-01-28 RX ORDER — NITROGLYCERIN 0.4 MG/1
0.4 TABLET SUBLINGUAL
Status: DISCONTINUED | OUTPATIENT
Start: 2023-01-28 | End: 2023-01-30 | Stop reason: HOSPADM

## 2023-01-28 RX ORDER — ONDANSETRON 2 MG/ML
4 INJECTION INTRAMUSCULAR; INTRAVENOUS EVERY 6 HOURS PRN
Status: DISCONTINUED | OUTPATIENT
Start: 2023-01-28 | End: 2023-01-30 | Stop reason: SDUPTHER

## 2023-01-28 RX ORDER — ASPIRIN 81 MG/1
81 TABLET ORAL DAILY
Status: DISCONTINUED | OUTPATIENT
Start: 2023-01-29 | End: 2023-01-30 | Stop reason: HOSPADM

## 2023-01-28 RX ORDER — ENOXAPARIN SODIUM 100 MG/ML
40 INJECTION SUBCUTANEOUS EVERY 24 HOURS
Status: DISCONTINUED | OUTPATIENT
Start: 2023-01-28 | End: 2023-01-30 | Stop reason: HOSPADM

## 2023-01-28 RX ORDER — ASPIRIN 81 MG/1
243 TABLET, CHEWABLE ORAL ONCE
Status: COMPLETED | OUTPATIENT
Start: 2023-01-28 | End: 2023-01-28

## 2023-01-28 RX ORDER — PANTOPRAZOLE SODIUM 40 MG/1
40 TABLET, DELAYED RELEASE ORAL
Status: DISCONTINUED | OUTPATIENT
Start: 2023-01-29 | End: 2023-01-30 | Stop reason: HOSPADM

## 2023-01-28 RX ORDER — CHOLECALCIFEROL (VITAMIN D3) 125 MCG
5 CAPSULE ORAL NIGHTLY PRN
Status: DISCONTINUED | OUTPATIENT
Start: 2023-01-28 | End: 2023-01-30 | Stop reason: HOSPADM

## 2023-01-28 RX ORDER — FERROUS SULFATE TAB EC 324 MG (65 MG FE EQUIVALENT) 324 (65 FE) MG
324 TABLET DELAYED RESPONSE ORAL
Status: DISCONTINUED | OUTPATIENT
Start: 2023-01-29 | End: 2023-01-30 | Stop reason: HOSPADM

## 2023-01-28 RX ORDER — SODIUM CHLORIDE 0.9 % (FLUSH) 0.9 %
10 SYRINGE (ML) INJECTION AS NEEDED
Status: DISCONTINUED | OUTPATIENT
Start: 2023-01-28 | End: 2023-01-30 | Stop reason: HOSPADM

## 2023-01-28 RX ORDER — SODIUM CHLORIDE 0.9 % (FLUSH) 0.9 %
10 SYRINGE (ML) INJECTION EVERY 12 HOURS SCHEDULED
Status: DISCONTINUED | OUTPATIENT
Start: 2023-01-28 | End: 2023-01-30 | Stop reason: HOSPADM

## 2023-01-28 RX ORDER — ONDANSETRON 4 MG/1
4 TABLET, FILM COATED ORAL EVERY 6 HOURS PRN
Status: DISCONTINUED | OUTPATIENT
Start: 2023-01-28 | End: 2023-01-30 | Stop reason: SDUPTHER

## 2023-01-28 RX ORDER — ASPIRIN 325 MG
325 TABLET ORAL ONCE
Status: DISCONTINUED | OUTPATIENT
Start: 2023-01-28 | End: 2023-01-28

## 2023-01-28 RX ORDER — FERROUS SULFATE 325(65) MG
325 TABLET ORAL
COMMUNITY

## 2023-01-28 RX ORDER — SODIUM CHLORIDE 9 MG/ML
40 INJECTION, SOLUTION INTRAVENOUS AS NEEDED
Status: DISCONTINUED | OUTPATIENT
Start: 2023-01-28 | End: 2023-01-30 | Stop reason: HOSPADM

## 2023-01-28 RX ORDER — HYDRALAZINE HYDROCHLORIDE 25 MG/1
75 TABLET, FILM COATED ORAL 2 TIMES DAILY
Status: DISCONTINUED | OUTPATIENT
Start: 2023-01-28 | End: 2023-01-30 | Stop reason: HOSPADM

## 2023-01-28 RX ORDER — ALBUTEROL SULFATE 90 UG/1
2 AEROSOL, METERED RESPIRATORY (INHALATION) EVERY 6 HOURS PRN
Status: DISCONTINUED | OUTPATIENT
Start: 2023-01-28 | End: 2023-01-30 | Stop reason: HOSPADM

## 2023-01-28 RX ORDER — ASCORBIC ACID 500 MG
500 TABLET ORAL DAILY
Status: DISCONTINUED | OUTPATIENT
Start: 2023-01-29 | End: 2023-01-30 | Stop reason: HOSPADM

## 2023-01-28 RX ORDER — HYDROCHLOROTHIAZIDE 25 MG/1
50 TABLET ORAL DAILY
Status: DISCONTINUED | OUTPATIENT
Start: 2023-01-29 | End: 2023-01-30 | Stop reason: HOSPADM

## 2023-01-28 RX ADMIN — Medication 10 ML: at 21:50

## 2023-01-28 RX ADMIN — ASPIRIN 81 MG CHEWABLE TABLET 243 MG: 81 TABLET CHEWABLE at 13:02

## 2023-01-28 RX ADMIN — ENOXAPARIN SODIUM 40 MG: 100 INJECTION SUBCUTANEOUS at 16:47

## 2023-01-28 RX ADMIN — NITROGLYCERIN 0.4 MG: 0.4 TABLET SUBLINGUAL at 15:16

## 2023-01-28 RX ADMIN — HYDRALAZINE HYDROCHLORIDE 75 MG: 25 TABLET, FILM COATED ORAL at 21:49

## 2023-01-29 ENCOUNTER — APPOINTMENT (OUTPATIENT)
Dept: CARDIOLOGY | Facility: HOSPITAL | Age: 68
End: 2023-01-29
Payer: MEDICARE

## 2023-01-29 ENCOUNTER — APPOINTMENT (OUTPATIENT)
Dept: NUCLEAR MEDICINE | Facility: HOSPITAL | Age: 68
End: 2023-01-29
Payer: MEDICARE

## 2023-01-29 LAB
ANION GAP SERPL CALCULATED.3IONS-SCNC: 10 MMOL/L (ref 5–15)
BASOPHILS # BLD AUTO: 0 10*3/MM3 (ref 0–0.2)
BASOPHILS NFR BLD AUTO: 0.1 % (ref 0–1.5)
BH CV ECHO MEAS - ACS: 1.99 CM
BH CV ECHO MEAS - AO MAX PG: 7 MMHG
BH CV ECHO MEAS - AO MEAN PG: 4 MMHG
BH CV ECHO MEAS - AO ROOT DIAM: 3.1 CM
BH CV ECHO MEAS - AO V2 MAX: 132.5 CM/SEC
BH CV ECHO MEAS - AO V2 VTI: 32.6 CM
BH CV ECHO MEAS - AVA(I,D): 2.03 CM2
BH CV ECHO MEAS - EDV(CUBED): 66.5 ML
BH CV ECHO MEAS - EDV(MOD-SP4): 93.7 ML
BH CV ECHO MEAS - EF(MOD-BP): 64 %
BH CV ECHO MEAS - EF(MOD-SP4): 64.1 %
BH CV ECHO MEAS - ESV(CUBED): 15 ML
BH CV ECHO MEAS - ESV(MOD-SP4): 33.6 ML
BH CV ECHO MEAS - FS: 39.1 %
BH CV ECHO MEAS - IVS/LVPW: 0.98 CM
BH CV ECHO MEAS - IVSD: 1.01 CM
BH CV ECHO MEAS - LA A2CS (ATRIAL LENGTH): 3.3 CM
BH CV ECHO MEAS - LV DIASTOLIC VOL/BSA (35-75): 47.6 CM2
BH CV ECHO MEAS - LV MASS(C)D: 132.8 GRAMS
BH CV ECHO MEAS - LV MAX PG: 3.7 MMHG
BH CV ECHO MEAS - LV MEAN PG: 2.13 MMHG
BH CV ECHO MEAS - LV SYSTOLIC VOL/BSA (12-30): 17.1 CM2
BH CV ECHO MEAS - LV V1 MAX: 96 CM/SEC
BH CV ECHO MEAS - LV V1 VTI: 23.5 CM
BH CV ECHO MEAS - LVIDD: 4.1 CM
BH CV ECHO MEAS - LVIDS: 2.47 CM
BH CV ECHO MEAS - LVOT AREA: 2.8 CM2
BH CV ECHO MEAS - LVOT DIAM: 1.89 CM
BH CV ECHO MEAS - LVPWD: 1.03 CM
BH CV ECHO MEAS - MV A MAX VEL: 105 CM/SEC
BH CV ECHO MEAS - MV DEC SLOPE: 602.7 CM/SEC2
BH CV ECHO MEAS - MV DEC TIME: 0.18 MSEC
BH CV ECHO MEAS - MV E MAX VEL: 105.8 CM/SEC
BH CV ECHO MEAS - MV E/A: 1.01
BH CV ECHO MEAS - MV MAX PG: 5.3 MMHG
BH CV ECHO MEAS - MV MEAN PG: 2.14 MMHG
BH CV ECHO MEAS - MV V2 VTI: 29.1 CM
BH CV ECHO MEAS - MVA(VTI): 2.27 CM2
BH CV ECHO MEAS - PA V2 MAX: 122.4 CM/SEC
BH CV ECHO MEAS - QP/QS: 0.97
BH CV ECHO MEAS - RAP SYSTOLE: 3 MMHG
BH CV ECHO MEAS - RV MAX PG: 2.8 MMHG
BH CV ECHO MEAS - RV V1 MAX: 84 CM/SEC
BH CV ECHO MEAS - RV V1 VTI: 19.9 CM
BH CV ECHO MEAS - RVDD: 2.9 CM
BH CV ECHO MEAS - RVOT DIAM: 2.03 CM
BH CV ECHO MEAS - RVSP: 28.6 MMHG
BH CV ECHO MEAS - SI(MOD-SP4): 30.5 ML/M2
BH CV ECHO MEAS - SV(LVOT): 66.1 ML
BH CV ECHO MEAS - SV(MOD-SP4): 60 ML
BH CV ECHO MEAS - SV(RVOT): 64.4 ML
BH CV ECHO MEAS - TR MAX PG: 25.6 MMHG
BH CV ECHO MEAS - TR MAX VEL: 253 CM/SEC
BH CV REST NUCLEAR ISOTOPE DOSE: 11 MCI
BH CV STRESS BP STAGE 1: NORMAL
BH CV STRESS BP STAGE 2: NORMAL
BH CV STRESS COMMENTS STAGE 1: NORMAL
BH CV STRESS COMMENTS STAGE 2: NORMAL
BH CV STRESS DOSE REGADENOSON STAGE 1: 0.4
BH CV STRESS DURATION MIN STAGE 1: 0
BH CV STRESS DURATION MIN STAGE 2: 4
BH CV STRESS DURATION SEC STAGE 1: 10
BH CV STRESS DURATION SEC STAGE 2: 0
BH CV STRESS HR STAGE 1: 76
BH CV STRESS HR STAGE 2: 92
BH CV STRESS NUCLEAR ISOTOPE DOSE: 30.4 MCI
BH CV STRESS PROTOCOL 1: NORMAL
BH CV STRESS RECOVERY BP: NORMAL MMHG
BH CV STRESS RECOVERY HR: 74 BPM
BH CV STRESS STAGE 1: 1
BH CV STRESS STAGE 2: 2
BUN SERPL-MCNC: 24 MG/DL (ref 8–23)
BUN/CREAT SERPL: 34.3 (ref 7–25)
CALCIUM SPEC-SCNC: 9.4 MG/DL (ref 8.6–10.5)
CHLORIDE SERPL-SCNC: 104 MMOL/L (ref 98–107)
CO2 SERPL-SCNC: 26 MMOL/L (ref 22–29)
CREAT SERPL-MCNC: 0.7 MG/DL (ref 0.57–1)
DEPRECATED RDW RBC AUTO: 44.6 FL (ref 37–54)
EGFRCR SERPLBLD CKD-EPI 2021: 95.5 ML/MIN/1.73
EOSINOPHIL # BLD AUTO: 0.2 10*3/MM3 (ref 0–0.4)
EOSINOPHIL NFR BLD AUTO: 2.6 % (ref 0.3–6.2)
ERYTHROCYTE [DISTWIDTH] IN BLOOD BY AUTOMATED COUNT: 14.4 % (ref 12.3–15.4)
GLUCOSE SERPL-MCNC: 120 MG/DL (ref 65–99)
HCT VFR BLD AUTO: 38 % (ref 34–46.6)
HGB BLD-MCNC: 12.3 G/DL (ref 12–15.9)
LV EF NUC BP: 65 %
LYMPHOCYTES # BLD AUTO: 3 10*3/MM3 (ref 0.7–3.1)
LYMPHOCYTES NFR BLD AUTO: 33.5 % (ref 19.6–45.3)
MAGNESIUM SERPL-MCNC: 1.9 MG/DL (ref 1.6–2.4)
MAXIMAL PREDICTED HEART RATE: 154 BPM
MAXIMAL PREDICTED HEART RATE: 154 BPM
MCH RBC QN AUTO: 28.4 PG (ref 26.6–33)
MCHC RBC AUTO-ENTMCNC: 32.2 G/DL (ref 31.5–35.7)
MCV RBC AUTO: 88.2 FL (ref 79–97)
MONOCYTES # BLD AUTO: 0.6 10*3/MM3 (ref 0.1–0.9)
MONOCYTES NFR BLD AUTO: 6.7 % (ref 5–12)
NEUTROPHILS NFR BLD AUTO: 5.1 10*3/MM3 (ref 1.7–7)
NEUTROPHILS NFR BLD AUTO: 57.1 % (ref 42.7–76)
NRBC BLD AUTO-RTO: 0 /100 WBC (ref 0–0.2)
PERCENT MAX PREDICTED HR: 60.39 %
PLATELET # BLD AUTO: 307 10*3/MM3 (ref 140–450)
PMV BLD AUTO: 7.4 FL (ref 6–12)
POTASSIUM SERPL-SCNC: 3.4 MMOL/L (ref 3.5–5.2)
QT INTERVAL: 343 MS
RBC # BLD AUTO: 4.31 10*6/MM3 (ref 3.77–5.28)
SODIUM SERPL-SCNC: 140 MMOL/L (ref 136–145)
STRESS BASELINE BP: NORMAL MMHG
STRESS BASELINE HR: 76 BPM
STRESS PERCENT HR: 71 %
STRESS POST PEAK BP: NORMAL MMHG
STRESS POST PEAK HR: 93 BPM
STRESS TARGET HR: 131 BPM
STRESS TARGET HR: 131 BPM
WBC NRBC COR # BLD: 9 10*3/MM3 (ref 3.4–10.8)

## 2023-01-29 PROCEDURE — 96372 THER/PROPH/DIAG INJ SC/IM: CPT

## 2023-01-29 PROCEDURE — G0378 HOSPITAL OBSERVATION PER HR: HCPCS

## 2023-01-29 PROCEDURE — A9502 TC99M TETROFOSMIN: HCPCS | Performed by: EMERGENCY MEDICINE

## 2023-01-29 PROCEDURE — 93017 CV STRESS TEST TRACING ONLY: CPT

## 2023-01-29 PROCEDURE — 25010000002 ENOXAPARIN PER 10 MG: Performed by: PHYSICIAN ASSISTANT

## 2023-01-29 PROCEDURE — 93016 CV STRESS TEST SUPVJ ONLY: CPT | Performed by: INTERNAL MEDICINE

## 2023-01-29 PROCEDURE — 83735 ASSAY OF MAGNESIUM: CPT | Performed by: PHYSICIAN ASSISTANT

## 2023-01-29 PROCEDURE — 25010000002 REGADENOSON 0.4 MG/5ML SOLUTION: Performed by: EMERGENCY MEDICINE

## 2023-01-29 PROCEDURE — 99214 OFFICE O/P EST MOD 30 MIN: CPT | Performed by: INTERNAL MEDICINE

## 2023-01-29 PROCEDURE — 93306 TTE W/DOPPLER COMPLETE: CPT | Performed by: INTERNAL MEDICINE

## 2023-01-29 PROCEDURE — 80048 BASIC METABOLIC PNL TOTAL CA: CPT | Performed by: PHYSICIAN ASSISTANT

## 2023-01-29 PROCEDURE — 93018 CV STRESS TEST I&R ONLY: CPT | Performed by: INTERNAL MEDICINE

## 2023-01-29 PROCEDURE — 0 TECHNETIUM TETROFOSMIN KIT: Performed by: EMERGENCY MEDICINE

## 2023-01-29 PROCEDURE — 85025 COMPLETE CBC W/AUTO DIFF WBC: CPT | Performed by: PHYSICIAN ASSISTANT

## 2023-01-29 PROCEDURE — 78452 HT MUSCLE IMAGE SPECT MULT: CPT

## 2023-01-29 PROCEDURE — 78452 HT MUSCLE IMAGE SPECT MULT: CPT | Performed by: INTERNAL MEDICINE

## 2023-01-29 PROCEDURE — 93306 TTE W/DOPPLER COMPLETE: CPT

## 2023-01-29 RX ORDER — POTASSIUM CHLORIDE 20 MEQ/1
20 TABLET, EXTENDED RELEASE ORAL ONCE
Status: DISCONTINUED | OUTPATIENT
Start: 2023-01-29 | End: 2023-01-30 | Stop reason: HOSPADM

## 2023-01-29 RX ORDER — POTASSIUM CHLORIDE 7.45 MG/ML
10 INJECTION INTRAVENOUS
Status: DISCONTINUED | OUTPATIENT
Start: 2023-01-29 | End: 2023-01-30 | Stop reason: HOSPADM

## 2023-01-29 RX ORDER — POTASSIUM CHLORIDE 1.5 G/1.77G
40 POWDER, FOR SOLUTION ORAL AS NEEDED
Status: DISCONTINUED | OUTPATIENT
Start: 2023-01-29 | End: 2023-01-30 | Stop reason: HOSPADM

## 2023-01-29 RX ORDER — MAGNESIUM SULFATE HEPTAHYDRATE 40 MG/ML
2 INJECTION, SOLUTION INTRAVENOUS AS NEEDED
Status: DISCONTINUED | OUTPATIENT
Start: 2023-01-29 | End: 2023-01-30 | Stop reason: HOSPADM

## 2023-01-29 RX ORDER — MAGNESIUM SULFATE HEPTAHYDRATE 40 MG/ML
4 INJECTION, SOLUTION INTRAVENOUS AS NEEDED
Status: DISCONTINUED | OUTPATIENT
Start: 2023-01-29 | End: 2023-01-30 | Stop reason: HOSPADM

## 2023-01-29 RX ORDER — SODIUM CHLORIDE 9 MG/ML
40 INJECTION, SOLUTION INTRAVENOUS AS NEEDED
Status: DISCONTINUED | OUTPATIENT
Start: 2023-01-29 | End: 2023-01-30

## 2023-01-29 RX ORDER — SODIUM CHLORIDE 0.9 % (FLUSH) 0.9 %
3-10 SYRINGE (ML) INJECTION AS NEEDED
Status: DISCONTINUED | OUTPATIENT
Start: 2023-01-29 | End: 2023-01-30

## 2023-01-29 RX ORDER — METOPROLOL SUCCINATE 25 MG/1
25 TABLET, EXTENDED RELEASE ORAL
Status: DISCONTINUED | OUTPATIENT
Start: 2023-01-29 | End: 2023-01-30 | Stop reason: HOSPADM

## 2023-01-29 RX ORDER — SODIUM CHLORIDE 0.9 % (FLUSH) 0.9 %
3 SYRINGE (ML) INJECTION EVERY 12 HOURS SCHEDULED
Status: DISCONTINUED | OUTPATIENT
Start: 2023-01-29 | End: 2023-01-30

## 2023-01-29 RX ORDER — POTASSIUM CHLORIDE 20 MEQ/1
40 TABLET, EXTENDED RELEASE ORAL AS NEEDED
Status: DISCONTINUED | OUTPATIENT
Start: 2023-01-29 | End: 2023-01-30 | Stop reason: HOSPADM

## 2023-01-29 RX ADMIN — METOPROLOL SUCCINATE 25 MG: 25 TABLET, EXTENDED RELEASE ORAL at 18:36

## 2023-01-29 RX ADMIN — POTASSIUM CHLORIDE 40 MEQ: 1500 TABLET, EXTENDED RELEASE ORAL at 12:24

## 2023-01-29 RX ADMIN — HYDROCHLOROTHIAZIDE 50 MG: 25 TABLET ORAL at 10:59

## 2023-01-29 RX ADMIN — Medication 10 ML: at 20:16

## 2023-01-29 RX ADMIN — ENOXAPARIN SODIUM 40 MG: 100 INJECTION SUBCUTANEOUS at 16:21

## 2023-01-29 RX ADMIN — REGADENOSON 0.4 MG: 0.08 INJECTION, SOLUTION INTRAVENOUS at 10:18

## 2023-01-29 RX ADMIN — ASPIRIN 81 MG: 81 TABLET, COATED ORAL at 10:59

## 2023-01-29 RX ADMIN — PANTOPRAZOLE SODIUM 40 MG: 40 TABLET, DELAYED RELEASE ORAL at 05:34

## 2023-01-29 RX ADMIN — OXYCODONE HYDROCHLORIDE AND ACETAMINOPHEN 500 MG: 500 TABLET ORAL at 11:00

## 2023-01-29 RX ADMIN — Medication 10 ML: at 12:25

## 2023-01-29 RX ADMIN — HYDRALAZINE HYDROCHLORIDE 75 MG: 25 TABLET, FILM COATED ORAL at 10:59

## 2023-01-29 RX ADMIN — POTASSIUM CHLORIDE 40 MEQ: 1500 TABLET, EXTENDED RELEASE ORAL at 16:19

## 2023-01-29 RX ADMIN — HYDRALAZINE HYDROCHLORIDE 75 MG: 25 TABLET, FILM COATED ORAL at 20:16

## 2023-01-29 RX ADMIN — FERROUS SULFATE TAB EC 324 MG (65 MG FE EQUIVALENT) 324 MG: 324 (65 FE) TABLET DELAYED RESPONSE at 10:59

## 2023-01-29 RX ADMIN — TETROFOSMIN 1 DOSE: 1.38 INJECTION, POWDER, LYOPHILIZED, FOR SOLUTION INTRAVENOUS at 10:18

## 2023-01-29 RX ADMIN — TETROFOSMIN 1 DOSE: 1.38 INJECTION, POWDER, LYOPHILIZED, FOR SOLUTION INTRAVENOUS at 07:20

## 2023-01-30 ENCOUNTER — READMISSION MANAGEMENT (OUTPATIENT)
Dept: CALL CENTER | Facility: HOSPITAL | Age: 68
End: 2023-01-30
Payer: MEDICARE

## 2023-01-30 VITALS
DIASTOLIC BLOOD PRESSURE: 52 MMHG | SYSTOLIC BLOOD PRESSURE: 131 MMHG | BODY MASS INDEX: 40.08 KG/M2 | OXYGEN SATURATION: 97 % | HEIGHT: 62 IN | TEMPERATURE: 98.4 F | WEIGHT: 217.81 LBS | RESPIRATION RATE: 16 BRPM | HEART RATE: 66 BPM

## 2023-01-30 PROBLEM — R07.89 CHEST DISCOMFORT: Status: ACTIVE | Noted: 2023-01-28

## 2023-01-30 LAB
ANION GAP SERPL CALCULATED.3IONS-SCNC: 11 MMOL/L (ref 5–15)
APTT PPP: 22.6 SECONDS (ref 24–31)
BASOPHILS # BLD AUTO: 0.1 10*3/MM3 (ref 0–0.2)
BASOPHILS NFR BLD AUTO: 1.4 % (ref 0–1.5)
BUN SERPL-MCNC: 18 MG/DL (ref 8–23)
BUN/CREAT SERPL: 25 (ref 7–25)
CALCIUM SPEC-SCNC: 9.2 MG/DL (ref 8.6–10.5)
CHLORIDE SERPL-SCNC: 101 MMOL/L (ref 98–107)
CO2 SERPL-SCNC: 27 MMOL/L (ref 22–29)
CREAT SERPL-MCNC: 0.72 MG/DL (ref 0.57–1)
DEPRECATED RDW RBC AUTO: 44.2 FL (ref 37–54)
EGFRCR SERPLBLD CKD-EPI 2021: 92.3 ML/MIN/1.73
EOSINOPHIL # BLD AUTO: 0.3 10*3/MM3 (ref 0–0.4)
EOSINOPHIL NFR BLD AUTO: 3 % (ref 0.3–6.2)
ERYTHROCYTE [DISTWIDTH] IN BLOOD BY AUTOMATED COUNT: 14.4 % (ref 12.3–15.4)
GLUCOSE SERPL-MCNC: 117 MG/DL (ref 65–99)
HCT VFR BLD AUTO: 41.8 % (ref 34–46.6)
HGB BLD-MCNC: 13.5 G/DL (ref 12–15.9)
INR PPP: 1 (ref 0.93–1.1)
LYMPHOCYTES # BLD AUTO: 3 10*3/MM3 (ref 0.7–3.1)
LYMPHOCYTES NFR BLD AUTO: 33.3 % (ref 19.6–45.3)
MAGNESIUM SERPL-MCNC: 1.9 MG/DL (ref 1.6–2.4)
MCH RBC QN AUTO: 28.5 PG (ref 26.6–33)
MCHC RBC AUTO-ENTMCNC: 32.3 G/DL (ref 31.5–35.7)
MCV RBC AUTO: 88.4 FL (ref 79–97)
MONOCYTES # BLD AUTO: 0.6 10*3/MM3 (ref 0.1–0.9)
MONOCYTES NFR BLD AUTO: 7 % (ref 5–12)
NEUTROPHILS NFR BLD AUTO: 5 10*3/MM3 (ref 1.7–7)
NEUTROPHILS NFR BLD AUTO: 55.3 % (ref 42.7–76)
NRBC BLD AUTO-RTO: 0.1 /100 WBC (ref 0–0.2)
PLATELET # BLD AUTO: 326 10*3/MM3 (ref 140–450)
PMV BLD AUTO: 7.5 FL (ref 6–12)
POTASSIUM SERPL-SCNC: 4.2 MMOL/L (ref 3.5–5.2)
PROTHROMBIN TIME: 10.3 SECONDS (ref 9.6–11.7)
QT INTERVAL: 397 MS
RBC # BLD AUTO: 4.73 10*6/MM3 (ref 3.77–5.28)
SODIUM SERPL-SCNC: 139 MMOL/L (ref 136–145)
TSH SERPL DL<=0.05 MIU/L-ACNC: 3.28 UIU/ML (ref 0.27–4.2)
WBC NRBC COR # BLD: 9 10*3/MM3 (ref 3.4–10.8)

## 2023-01-30 PROCEDURE — C1894 INTRO/SHEATH, NON-LASER: HCPCS | Performed by: INTERNAL MEDICINE

## 2023-01-30 PROCEDURE — 85025 COMPLETE CBC W/AUTO DIFF WBC: CPT | Performed by: PHYSICIAN ASSISTANT

## 2023-01-30 PROCEDURE — 84443 ASSAY THYROID STIM HORMONE: CPT | Performed by: INTERNAL MEDICINE

## 2023-01-30 PROCEDURE — C1769 GUIDE WIRE: HCPCS | Performed by: INTERNAL MEDICINE

## 2023-01-30 PROCEDURE — 99152 MOD SED SAME PHYS/QHP 5/>YRS: CPT | Performed by: INTERNAL MEDICINE

## 2023-01-30 PROCEDURE — G0378 HOSPITAL OBSERVATION PER HR: HCPCS

## 2023-01-30 PROCEDURE — 83735 ASSAY OF MAGNESIUM: CPT | Performed by: PHYSICIAN ASSISTANT

## 2023-01-30 PROCEDURE — 25010000002 FENTANYL CITRATE (PF) 100 MCG/2ML SOLUTION: Performed by: INTERNAL MEDICINE

## 2023-01-30 PROCEDURE — 99233 SBSQ HOSP IP/OBS HIGH 50: CPT | Performed by: INTERNAL MEDICINE

## 2023-01-30 PROCEDURE — 85610 PROTHROMBIN TIME: CPT | Performed by: INTERNAL MEDICINE

## 2023-01-30 PROCEDURE — 25010000002 MIDAZOLAM PER 1 MG: Performed by: INTERNAL MEDICINE

## 2023-01-30 PROCEDURE — 93460 R&L HRT ART/VENTRICLE ANGIO: CPT | Performed by: INTERNAL MEDICINE

## 2023-01-30 PROCEDURE — 80048 BASIC METABOLIC PNL TOTAL CA: CPT | Performed by: PHYSICIAN ASSISTANT

## 2023-01-30 PROCEDURE — 0 IOPAMIDOL PER 1 ML: Performed by: INTERNAL MEDICINE

## 2023-01-30 PROCEDURE — 85730 THROMBOPLASTIN TIME PARTIAL: CPT | Performed by: EMERGENCY MEDICINE

## 2023-01-30 RX ORDER — METOPROLOL SUCCINATE 25 MG/1
25 TABLET, EXTENDED RELEASE ORAL
Qty: 30 TABLET | Refills: 0 | Status: SHIPPED | OUTPATIENT
Start: 2023-01-30

## 2023-01-30 RX ORDER — FENTANYL CITRATE 50 UG/ML
INJECTION, SOLUTION INTRAMUSCULAR; INTRAVENOUS
Status: DISCONTINUED | OUTPATIENT
Start: 2023-01-30 | End: 2023-01-30 | Stop reason: HOSPADM

## 2023-01-30 RX ORDER — SODIUM CHLORIDE 9 MG/ML
INJECTION, SOLUTION INTRAVENOUS
Status: COMPLETED | OUTPATIENT
Start: 2023-01-30 | End: 2023-01-30

## 2023-01-30 RX ORDER — DIPHENHYDRAMINE HCL 25 MG
25 CAPSULE ORAL EVERY 6 HOURS PRN
Status: DISCONTINUED | OUTPATIENT
Start: 2023-01-30 | End: 2023-01-30 | Stop reason: HOSPADM

## 2023-01-30 RX ORDER — ONDANSETRON 4 MG/1
4 TABLET, FILM COATED ORAL EVERY 6 HOURS PRN
Status: DISCONTINUED | OUTPATIENT
Start: 2023-01-30 | End: 2023-01-30 | Stop reason: HOSPADM

## 2023-01-30 RX ORDER — MIDAZOLAM HYDROCHLORIDE 1 MG/ML
INJECTION INTRAMUSCULAR; INTRAVENOUS
Status: DISCONTINUED | OUTPATIENT
Start: 2023-01-30 | End: 2023-01-30 | Stop reason: HOSPADM

## 2023-01-30 RX ORDER — LIDOCAINE HYDROCHLORIDE 20 MG/ML
INJECTION, SOLUTION INFILTRATION; PERINEURAL
Status: DISCONTINUED | OUTPATIENT
Start: 2023-01-30 | End: 2023-01-30 | Stop reason: HOSPADM

## 2023-01-30 RX ORDER — ONDANSETRON 2 MG/ML
4 INJECTION INTRAMUSCULAR; INTRAVENOUS EVERY 6 HOURS PRN
Status: DISCONTINUED | OUTPATIENT
Start: 2023-01-30 | End: 2023-01-30 | Stop reason: HOSPADM

## 2023-01-30 RX ORDER — ACETAMINOPHEN 325 MG/1
650 TABLET ORAL EVERY 4 HOURS PRN
Status: DISCONTINUED | OUTPATIENT
Start: 2023-01-30 | End: 2023-01-30 | Stop reason: HOSPADM

## 2023-01-30 RX ORDER — SODIUM CHLORIDE 9 MG/ML
250 INJECTION, SOLUTION INTRAVENOUS ONCE AS NEEDED
Status: DISCONTINUED | OUTPATIENT
Start: 2023-01-30 | End: 2023-01-30 | Stop reason: HOSPADM

## 2023-01-30 RX ADMIN — METOPROLOL SUCCINATE 25 MG: 25 TABLET, EXTENDED RELEASE ORAL at 13:19

## 2023-01-30 RX ADMIN — PANTOPRAZOLE SODIUM 40 MG: 40 TABLET, DELAYED RELEASE ORAL at 05:42

## 2023-01-30 RX ADMIN — ASPIRIN 81 MG: 81 TABLET, COATED ORAL at 05:42

## 2023-01-30 RX ADMIN — HYDRALAZINE HYDROCHLORIDE 75 MG: 25 TABLET, FILM COATED ORAL at 13:19

## 2023-01-30 NOTE — OUTREACH NOTE
Prep Survey    Flowsheet Row Responses   [facility] facility patient discharged from? [facility]   Is LACE score < 7 ? Yes   Eligibility [facility]   Date of Admission 01/28/23   Date of Discharge 01/30/23   Discharge Disposition Home or Self Care   Discharge diagnosis Chest pain   Does the patient have one of the following disease processes/diagnoses(primary or secondary)? Other   Does the patient have Home health ordered? No   Is there a DME ordered? No   Prep survey completed? Yes          ROSIBEL WYNN - Registered Nurse

## 2023-01-31 ENCOUNTER — TRANSITIONAL CARE MANAGEMENT TELEPHONE ENCOUNTER (OUTPATIENT)
Dept: CALL CENTER | Facility: HOSPITAL | Age: 68
End: 2023-01-31
Payer: MEDICARE

## 2023-01-31 NOTE — OUTREACH NOTE
Call Center TCM Note    Flowsheet Row Responses   [facility] patient discharged from? [facility]   Does the patient have one of the following disease processes/diagnoses(primary or secondary)? Other   TCM attempt successful? Yes   Call start time 1429   Call end time 1436   Discharge diagnosis Chest pain, heart cath   Person spoke with today (if not patient) and relationship patient   Meds reviewed with patient/caregiver? Yes  [New: metoprolol succinate]   Does the patient have all medications ordered at discharge? No   What is keeping the patient from filling the prescriptions? --  [Has not picked up from her pharmacy yet. Will get this afternoon. Will start taking in the am. ]   Nursing Interventions Nurse provided patient education   Is the patient taking all medications as directed (includes completed medication regime)? No   What is preventing the patient from taking all medications as directed? Other  [see above. ]   Comments PCP Antoinette Bernstein, [facility] FOLLOW UP 2/3/2023  1:45 PM   Does the patient have an appointment with their PCP within 7 days of discharge? Yes   Has home health visited the patient within 72 hours of discharge? N/A   Psychosocial issues? No   Comments patient to monitor home b/p and HR and record readings to bring to f/u appt.    Did the patient receive a copy of their discharge instructions? Yes   Nursing interventions Reviewed instructions with patient   What is the patient's perception of their health status since discharge? Improving   Is the patient/caregiver able to teach back signs and symptoms related to disease process for when to call PCP? Yes   If the patient is a current smoker, are they able to teach back resources for cessation? Not a smoker   TCM call completed? Yes   Call end time 1436   Would this patient benefit from a Referral to Amb Social Work? No   Is the patient interested in additional calls from an ambulatory ?  NOTE:  applies to high risk  patients requiring additional follow-up. No          Sussy Levy RN    1/31/2023, 14:36 EST

## 2023-02-03 ENCOUNTER — OFFICE VISIT (OUTPATIENT)
Dept: FAMILY MEDICINE CLINIC | Facility: CLINIC | Age: 68
End: 2023-02-03
Payer: MEDICARE

## 2023-02-03 VITALS
RESPIRATION RATE: 18 BRPM | HEIGHT: 62 IN | SYSTOLIC BLOOD PRESSURE: 146 MMHG | OXYGEN SATURATION: 96 % | BODY MASS INDEX: 40.12 KG/M2 | HEART RATE: 69 BPM | WEIGHT: 218 LBS | DIASTOLIC BLOOD PRESSURE: 80 MMHG

## 2023-02-03 DIAGNOSIS — I20.2 REFRACTORY ANGINA PECTORIS: ICD-10-CM

## 2023-02-03 DIAGNOSIS — F41.9 ANXIETY: ICD-10-CM

## 2023-02-03 DIAGNOSIS — Z09 HOSPITAL DISCHARGE FOLLOW-UP: Primary | ICD-10-CM

## 2023-02-03 PROCEDURE — 99495 TRANSJ CARE MGMT MOD F2F 14D: CPT | Performed by: NURSE PRACTITIONER

## 2023-02-03 PROCEDURE — 1111F DSCHRG MED/CURRENT MED MERGE: CPT | Performed by: NURSE PRACTITIONER

## 2023-02-03 RX ORDER — HYDROXYZINE HYDROCHLORIDE 10 MG/1
10 TABLET, FILM COATED ORAL 3 TIMES DAILY PRN
Qty: 30 TABLET | Refills: 1 | Status: SHIPPED | OUTPATIENT
Start: 2023-02-03 | End: 2023-03-03

## 2023-02-03 RX ORDER — ESCITALOPRAM OXALATE 10 MG/1
10 TABLET ORAL DAILY
Qty: 30 TABLET | Refills: 1 | Status: SHIPPED | OUTPATIENT
Start: 2023-02-03 | End: 2023-03-03

## 2023-02-03 NOTE — PROGRESS NOTES
Transitional Care Follow Up Visit  Subjective     Scarlet Gong is a 67 y.o. female who presents for a transitional care management visit.    Within 48 business hours after discharge our office contacted her via telephone to coordinate her care and needs.      I reviewed and discussed the details of that call along with the discharge summary, hospital problems, inpatient lab results, inpatient diagnostic studies, and consultation reports with Scarlet.     Current outpatient and discharge medications have been reconciled for the patient.  Reviewed by: ANGELINA Gresham      Date of TCM Phone Call 1/30/2023   [facility]   Date of Admission 1/28/2023   Date of Discharge 1/30/2023   Discharge Disposition Home or Self Care     Risk for Readmission (LACE) Score: 5 (1/30/2023  6:00 AM)      History of Present Illness   Course During Hospital Stay:  1/28/23 - 1/30/23  Discharge Diagnosis:       Chest pain    Mixed hyperlipidemia    Essential hypertension    Chest discomfort        Hospital Course  Patient is a 67 y.o. female presented with chest pain.  Patient reported dyspnea as well upon admission and states she shortness of breath since having COVID-pneumonia 2 years ago.  Troponins negative.  Chest x-ray shows no acute cardiopulmonary process with stable chronic interstitial changes.  EKG shows sinus rhythm heart rate 90 without obvious acute changes.  Stress test showed normal EF 65% with small and medium size mild to moderately severe ischemia in the basal inferior wall.  Echocardiogram showed EF of 6175% with mild hypertrophy and normal diastolic function.  Patient underwent cardiac catheterization showed no evidence of pulmonary hypertension with EF of 60%, and normal coronary arteries except for 50% blockage in proximal diagonal branch.  Noncardiac work-up with beta-blocker started.  Patient to monitor blood pressures while at home.  Patient to follow-up with cardiology in 1 month.  Patient to  "follow primary care in 1 week for continued care management.  Patient to follow-up post cardiac catheterization Home care instructions.  Testing and recommendations reviewed patient family and they agree to treat plan.  If symptoms worsen patient call 911 or go to nearest ED.     The following portions of the patient's history were reviewed and updated as appropriate: allergies, current medications, past family history, past medical history, past social history, past surgical history and problem list.    Review of Systems   Constitutional: Positive for fatigue. Negative for appetite change and fever.   Respiratory: Positive for shortness of breath.    Cardiovascular: Positive for chest pain.        Has continued to have chest pain describes as \"light pressure\"    Psychiatric/Behavioral: The patient is nervous/anxious.         Tells me that she is unable to tolerate the CPAP machine, longest she has worn the mask is 5 hours and she was awake the whole time    She tells me that she has worse shortness of breath when she is feeling anxious - she has had a prn medicine in the past which worked well for her, however she cannot recall the name of it       Objective   Physical Exam  Vitals reviewed.   Constitutional:       Appearance: Normal appearance.   Neck:      Vascular: No carotid bruit.   Cardiovascular:      Rate and Rhythm: Normal rate and regular rhythm.      Pulses: Normal pulses.      Heart sounds: Normal heart sounds.   Pulmonary:      Effort: Pulmonary effort is normal.      Breath sounds: Normal breath sounds.   Musculoskeletal:      Right lower leg: No edema.      Left lower leg: No edema.   Skin:     General: Skin is warm.   Neurological:      Mental Status: She is alert and oriented to person, place, and time.        Assessment & Plan   Diagnoses and all orders for this visit:    1. Hospital discharge follow-up (Primary)  -     Ambulatory Referral to Cardiology    2. Refractory angina pectoris  -     " Ambulatory Referral to Cardiology    3. Anxiety  -     escitalopram (Lexapro) 10 MG tablet; Take 1 tablet by mouth Daily.  Dispense: 30 tablet; Refill: 1  -     hydrOXYzine (ATARAX) 10 MG tablet; Take 1 tablet by mouth 3 (Three) Times a Day As Needed for Anxiety.  Dispense: 30 tablet; Refill: 1

## 2023-02-06 ENCOUNTER — TELEPHONE (OUTPATIENT)
Dept: FAMILY MEDICINE CLINIC | Facility: CLINIC | Age: 68
End: 2023-02-06

## 2023-02-06 NOTE — TELEPHONE ENCOUNTER
"Please call Ms. Bowen and give her the following information -   Dr. Dwaine Naqvi, [phone]   The device is called the \"Inspire\"  Thank you"

## 2023-02-06 NOTE — TELEPHONE ENCOUNTER
Provider: ZHEN  Caller: PATIENT   Relationship to Patient: SELF     Phone Number: [phone]  Reason for Call: PATIENT IS ASKING ABOUT THE SLEEP [facility] DOCTOR IN [place] THAT SHE AND ZHEN TALKED ABOUT WHEN SHE WAS IN OFFICE ON Friday.

## 2023-03-03 ENCOUNTER — OFFICE VISIT (OUTPATIENT)
Dept: FAMILY MEDICINE CLINIC | Facility: CLINIC | Age: 68
End: 2023-03-03
Payer: MEDICARE

## 2023-03-03 VITALS
DIASTOLIC BLOOD PRESSURE: 70 MMHG | HEIGHT: 62 IN | HEART RATE: 91 BPM | RESPIRATION RATE: 18 BRPM | OXYGEN SATURATION: 98 % | WEIGHT: 219 LBS | BODY MASS INDEX: 40.3 KG/M2 | SYSTOLIC BLOOD PRESSURE: 130 MMHG

## 2023-03-03 DIAGNOSIS — M25.561 CHRONIC PAIN OF BOTH KNEES: ICD-10-CM

## 2023-03-03 DIAGNOSIS — F41.9 ANXIETY: Primary | ICD-10-CM

## 2023-03-03 DIAGNOSIS — G89.29 CHRONIC PAIN OF BOTH KNEES: ICD-10-CM

## 2023-03-03 DIAGNOSIS — M25.562 CHRONIC PAIN OF BOTH KNEES: ICD-10-CM

## 2023-03-03 PROCEDURE — 99213 OFFICE O/P EST LOW 20 MIN: CPT | Performed by: NURSE PRACTITIONER

## 2023-03-03 RX ORDER — HYDROXYZINE HYDROCHLORIDE 25 MG/1
TABLET, FILM COATED ORAL
Qty: 120 TABLET | Refills: 3 | Status: SHIPPED | OUTPATIENT
Start: 2023-03-03

## 2023-03-03 NOTE — PROGRESS NOTES
"Chief Complaint  Anxiety    Subjective          Scarlet Gong presents to [facility]  History of Present Illness    Is here today for follow up on anxiety  At last visit we started lexapro 10mg    Today she tells me that she still is feeling irritable and edgy    She tells me today that during covid she used hydroxyzine every 6-8 hours and had very good results with it    At this time she would like to discontinue the lexapro and use the hydroxyzine as needed    Review of Systems   Constitutional: Negative.  Negative for appetite change and fatigue.   Respiratory: Negative.  Negative for shortness of breath.         Endorses that her breathing is improved since being on lexapro   Cardiovascular: Negative.  Negative for chest pain.   Musculoskeletal: Positive for arthralgias.        Also today is c/o legs, knees and feet hurting related to her job as a    Neurological: Negative.  Negative for dizziness, weakness and headaches.   Psychiatric/Behavioral: Positive for agitation. Negative for sleep disturbance. The patient is nervous/anxious.        Objective   Vital Signs:  /70 (BP Location: Right arm, Patient Position: Sitting)   Pulse 91   Resp 18   Ht 157.5 cm (62.01\")   Wt 99.3 kg (219 lb)   SpO2 98%   BMI 40.05 kg/m²     BP Readings from Last 3 Encounters:   03/03/23 130/70   02/03/23 146/80   01/30/23 131/52        Wt Readings from Last 3 Encounters:   03/03/23 99.3 kg (219 lb)   02/03/23 98.9 kg (218 lb)   01/30/23 98.8 kg (217 lb 13 oz)              Physical Exam  Vitals reviewed.   Constitutional:       Appearance: Normal appearance.   Cardiovascular:      Rate and Rhythm: Normal rate.   Pulmonary:      Effort: Pulmonary effort is normal.   Skin:     General: Skin is warm.   Neurological:      Mental Status: She is alert and oriented to person, place, and time.   Psychiatric:         Mood and Affect: Mood normal.         Thought Content: Thought " content normal.        Result Review :                 Assessment and Plan    Diagnoses and all orders for this visit:    1. Anxiety (Primary)  -     hydrOXYzine (ATARAX) 25 MG tablet; Take 1 or 2 tablets every 6-8 hours if needed for anxiety  Dispense: 120 tablet; Refill: 3    2. Chronic pain of both knees  -     Diclofenac Sodium (VOLTAREN) 1 % gel gel; Apply 4 g topically to the appropriate area as directed 4 (Four) Times a Day.  Dispense: 100 g; Refill: 3             Follow Up   Return for Next scheduled follow up.  Patient was given instructions and counseling regarding her condition or for health maintenance advice. Please see specific information pulled into the AVS if appropriate.

## 2023-03-03 NOTE — PATIENT INSTRUCTIONS
Take a magnesium tablet 400mg at bedtime to help with your leg pain    You can also try the following over the counter supplements for your joint pain -   Tumeric  Collagen powder  Glucosamine

## 2023-03-16 ENCOUNTER — OFFICE VISIT (OUTPATIENT)
Dept: CARDIOLOGY | Facility: CLINIC | Age: 68
End: 2023-03-16
Payer: MEDICARE

## 2023-03-16 VITALS
SYSTOLIC BLOOD PRESSURE: 137 MMHG | DIASTOLIC BLOOD PRESSURE: 77 MMHG | HEIGHT: 62 IN | BODY MASS INDEX: 40.48 KG/M2 | OXYGEN SATURATION: 98 % | WEIGHT: 220 LBS | HEART RATE: 77 BPM

## 2023-03-16 DIAGNOSIS — R06.02 SHORTNESS OF BREATH: ICD-10-CM

## 2023-03-16 DIAGNOSIS — I10 ESSENTIAL HYPERTENSION: Primary | ICD-10-CM

## 2023-03-16 DIAGNOSIS — R07.89 CHEST DISCOMFORT: ICD-10-CM

## 2023-03-16 PROCEDURE — 1159F MED LIST DOCD IN RCRD: CPT | Performed by: INTERNAL MEDICINE

## 2023-03-16 PROCEDURE — 99214 OFFICE O/P EST MOD 30 MIN: CPT | Performed by: INTERNAL MEDICINE

## 2023-03-16 PROCEDURE — 3078F DIAST BP <80 MM HG: CPT | Performed by: INTERNAL MEDICINE

## 2023-03-16 PROCEDURE — 1160F RVW MEDS BY RX/DR IN RCRD: CPT | Performed by: INTERNAL MEDICINE

## 2023-03-16 PROCEDURE — 3075F SYST BP GE 130 - 139MM HG: CPT | Performed by: INTERNAL MEDICINE

## 2023-03-16 NOTE — PROGRESS NOTES
Encounter Date:03/16/2023  Last seen 1/30/2023      Patient ID: Scarlet Gong is a 67 y.o. female.    Chief Complaint:  Chest discomfort  Hypertension  Dyslipidemia  Hypomagnesemia    History of Present Illness    Patient recently was admitted to [facility] with chest pain.  Patient has a history of hypertension and dyslipidemia.  Patient had cardiac catheterization and coronary arteriography on 1/30/2023.  Patient was released home.    Since I have last seen, the patient has been without any chest discomfort ,shortness of breath, palpitations, dizziness or syncope.  Denies having any headache ,abdominal pain ,nausea, vomiting , diarrhea constipation, loss of weight or loss of appetite.  Denies having any excessive bruising ,hematuria or blood in the stool.    Review of all systems negative except as indicated.    Reviewed ROS.  Assessment and Plan        ]]]]]]]]]]]]]]]]  Impression  ======  Chest discomfort  Positive stress Cardiolite test  Negative troponin levels  EKG showed no acute changes    Cardiac catheterization 1/30/2023  No evidence for pulmonary hypertension.  Normal left ventricular size and contractility with ejection fraction of 60%.  Normal epicardial coronary arteries except for 50% proximal diagonal branch disease.     Cardiac catheterization 3/4/2020 revealed  Normal left ventricular size and contractility with ejection fraction of 60%.  Normal epicardial coronary arteries.     Mild elevation of w-koios-lotepuua CT scan of the chest.     Hypertension dyslipidemia asthma.  Obstructive sleep apnea     Significant hypomagnesemia     Exogenous obesity.     Previous COVID-19 infection     No significant surgical problems     Family history of coronary artery disease     Former smoker     Allergic to Z-Bruce     ============  Plan  ========  Chest discomfort better but still has sharp pains.  Atypical.  Recent cardiac catheterization revealed normal coronary arteries and no evidence for  pulmonary hypertension.  Results were discussed and educated patient.    Shortness of breath likely due to COPD.  Close follow-up with pulmonologist Dr. Fontana.    Hypertension-137/77  Continue hydralazine hydrochlorothiazide metoprolol    History of hypomagnesemia-improved.    Medications were reviewed and updated.    Follow-up in the office in 6 months.     Further plan will depend on patient's progress.  ]]]]]]]]]]]]      Diagnosis Plan   1. Essential hypertension        2. Chest discomfort        3. Shortness of breath        LAB RESULTS (LAST 7 DAYS)    CBC        BMP        CMP         BNP        TROPONIN        CoAg        Creatinine Clearance  CrCl cannot be calculated (Patient's most recent lab result is older than the maximum 30 days allowed.).    ABG        Radiology  No radiology results for the last day                The following portions of the patient's history were reviewed and updated as appropriate: allergies, current medications, past family history, past medical history, past social history, past surgical history and problem list.    Review of Systems   Constitutional: Positive for malaise/fatigue.   Cardiovascular: Positive for chest pain. Negative for dyspnea on exertion, leg swelling and palpitations.   Respiratory: Positive for shortness of breath. Negative for cough.    Gastrointestinal: Negative for abdominal pain, nausea and vomiting.   Neurological: Negative for dizziness, focal weakness, headaches, light-headedness and numbness.   All other systems reviewed and are negative.        Current Outpatient Medications:   •  albuterol sulfate  (90 Base) MCG/ACT inhaler, Inhale 2 puffs Every 6 (Six) Hours As Needed., Disp: , Rfl:   •  ascorbic acid (VITAMIN C) 500 MG tablet, Take 1 tablet by mouth Daily., Disp: , Rfl:   •  aspirin 81 MG EC tablet, Take 1 tablet by mouth Daily., Disp: , Rfl:   •  calcium carbonate (OS-JEANA) 600 MG tablet, Take 1 tablet by mouth Daily., Disp: , Rfl:   •   "Cholecalciferol 50 MCG (2000 UT) capsule, Take 2,000 Int'l Units by mouth., Disp: , Rfl:   •  Diclofenac Sodium (VOLTAREN) 1 % gel gel, Apply 4 g topically to the appropriate area as directed 4 (Four) Times a Day., Disp: 100 g, Rfl: 3  •  ferrous sulfate 325 (65 FE) MG tablet, Take 1 tablet by mouth Daily With Breakfast., Disp: , Rfl:   •  hydrALAZINE (APRESOLINE) 50 MG tablet, Take 1.5 tablets by mouth 2 (Two) Times a Day., Disp: , Rfl:   •  hydroCHLOROthiazide (HYDRODIURIL) 50 MG tablet, Take 1 tablet by mouth Daily., Disp: , Rfl:   •  hydrocortisone 2.5 % ointment, Apply a thin layer to affected area twice daily x 2 weeks when flared, Disp: , Rfl:   •  hydrOXYzine (ATARAX) 25 MG tablet, Take 1 or 2 tablets every 6-8 hours if needed for anxiety, Disp: 120 tablet, Rfl: 3  •  metoprolol succinate XL (TOPROL-XL) 25 MG 24 hr tablet, Take 1 tablet by mouth Daily., Disp: 30 tablet, Rfl: 0  •  Potassium 99 MG tablet, Take 1 tablet by mouth 2 (Two) Times a Day., Disp: , Rfl:   •  Vit C-Cholecalciferol-Bhanu Hip (VITAMIN C & D3/BHANU HIPS PO), Take 1,000 mg by mouth Daily., Disp: , Rfl:   •  zinc gluconate 50 MG tablet, Take 1 tablet by mouth Daily., Disp: , Rfl:     Allergies   Allergen Reactions   • Prednisone Rash   • Atorvastatin Itching   • Lisinopril Cough   • Azithromycin Rash     Re-entered from allergy \"Z-Pack\"       Family History   Problem Relation Age of Onset   • Hypertension Mother    • Heart disease Sister    • Heart disease Brother    • Colon cancer Maternal Grandmother        Past Surgical History:   Procedure Laterality Date   • CARDIAC CATHETERIZATION N/A 03/04/2020    Procedure: Left Heart Cath and coronary angiogram;  Surgeon: Hien Camacho MD;  Location: [facility] CATH INVASIVE LOCATION;  Service: Cardiovascular;  Laterality: N/A;   • CARDIAC CATHETERIZATION N/A 03/04/2020    Procedure: Left ventriculography;  Surgeon: Hien Camacho MD;  Location: [facility] CATH INVASIVE LOCATION;  Service: " "Cardiovascular;  Laterality: N/A;   • CARDIAC CATHETERIZATION N/A 1/30/2023    Procedure: Left Heart Cath and coronary angiogram;  Surgeon: Hien Camacho MD;  Location: [facility] CATH INVASIVE LOCATION;  Service: Cardiovascular;  Laterality: N/A;   • CARDIAC CATHETERIZATION N/A 1/30/2023    Procedure: Right Heart Cath;  Surgeon: Hien Camacho MD;  Location:  [facility] CATH INVASIVE LOCATION;  Service: Cardiovascular;  Laterality: N/A;       Past Medical History:   Diagnosis Date   • Asthma 04/27/2016   • Hyperlipidemia 03/02/2020   • Hypertension 03/02/2020   • Obesity (BMI 30-39.9) 03/02/2020   • Osteopenia 03/02/2020       Family History   Problem Relation Age of Onset   • Hypertension Mother    • Heart disease Sister    • Heart disease Brother    • Colon cancer Maternal Grandmother        Social History     Socioeconomic History   • Marital status:    Tobacco Use   • Smoking status: Former     Packs/day: 1.50     Years: 32.00     Pack years: 48.00     Types: Cigarettes     Passive exposure: Past   • Smokeless tobacco: Never   Vaping Use   • Vaping Use: Never used   Substance and Sexual Activity   • Alcohol use: Never   • Drug use: Never   • Sexual activity: Defer         Procedures      Objective:       Physical Exam    /77 (BP Location: Right arm, Patient Position: Sitting, Cuff Size: Adult)   Pulse 77   Ht 157.5 cm (62\")   Wt 99.8 kg (220 lb)   SpO2 98%   BMI 40.24 kg/m²   The patient is alert, oriented and in no distress.    Vital signs as noted above.  Exogenous obesity.    Head and neck revealed no carotid bruits or jugular venous distension.  No thyromegaly or lymphadenopathy is present.    Lungs clear.  No wheezing.  Breath sounds are normal bilaterally.    Heart normal first and second heart sounds.  No murmur..  No pericardial rub is present.  No gallop is present.    Abdomen soft and nontender.  No organomegaly is present.    Extremities revealed good peripheral pulses without any " pedal edema.    Skin warm and dry.    Musculoskeletal system is grossly normal.    CNS grossly normal.    Reviewed and updated.

## 2023-04-17 ENCOUNTER — OFFICE VISIT (OUTPATIENT)
Dept: FAMILY MEDICINE CLINIC | Facility: CLINIC | Age: 68
End: 2023-04-17
Payer: MEDICARE

## 2023-04-17 VITALS
RESPIRATION RATE: 18 BRPM | HEART RATE: 71 BPM | HEIGHT: 62 IN | DIASTOLIC BLOOD PRESSURE: 80 MMHG | WEIGHT: 223 LBS | BODY MASS INDEX: 41.04 KG/M2 | SYSTOLIC BLOOD PRESSURE: 130 MMHG | OXYGEN SATURATION: 98 %

## 2023-04-17 DIAGNOSIS — G89.29 CHRONIC PAIN OF BOTH KNEES: ICD-10-CM

## 2023-04-17 DIAGNOSIS — M25.562 CHRONIC PAIN OF LEFT KNEE: ICD-10-CM

## 2023-04-17 DIAGNOSIS — M25.511 ACUTE PAIN OF RIGHT SHOULDER: Primary | ICD-10-CM

## 2023-04-17 DIAGNOSIS — M25.561 CHRONIC PAIN OF BOTH KNEES: ICD-10-CM

## 2023-04-17 DIAGNOSIS — M25.562 CHRONIC PAIN OF BOTH KNEES: ICD-10-CM

## 2023-04-17 DIAGNOSIS — G89.29 CHRONIC PAIN OF LEFT KNEE: ICD-10-CM

## 2023-04-17 NOTE — PROGRESS NOTES
"Chief Complaint  Knee Pain and Pain (arm)    Subjective          Scarlet Gong presents to [facility]  History of Present Illness    Is here today with right shoulder pain which started about 4 weeks ago  It hurts over the entire shoulder, into her arm pit and down her arm with certain motions  Is unable to raise the arm without significant pain, is unable to rotate the arm backwards  She does not recall any injury to the shoulder, it started hurting at the end of her work day  She has taken tylenol and has used ice and heat, has also applied topical pain releivers - she will get some relief for the short term but it returns with use    She also today is c/o left knee pain, has had surgery in the past per Dr. Gomez  Describes as aching, burning pain   The knee has been painful for \"a long time\" but has gotten really bad over the past year or two    Review of Systems   Constitutional: Negative.  Negative for appetite change and fever.   Musculoskeletal: Positive for arthralgias.        Right shoulder pain and left knee pain   Psychiatric/Behavioral: Positive for sleep disturbance.        Endorses that her sleep is disrupted by pain     Objective   Vital Signs:  /80 (BP Location: Left arm, Patient Position: Sitting)   Pulse 71   Resp 18   Ht 157.5 cm (62.01\")   Wt 101 kg (223 lb)   SpO2 98%   BMI 40.78 kg/m²     BP Readings from Last 3 Encounters:   04/17/23 130/80   03/16/23 137/77   03/03/23 130/70        Wt Readings from Last 3 Encounters:   04/17/23 101 kg (223 lb)   03/16/23 99.8 kg (220 lb)   03/03/23 99.3 kg (219 lb)              Physical Exam  Vitals reviewed.   Constitutional:       Appearance: Normal appearance.   Cardiovascular:      Rate and Rhythm: Normal rate and regular rhythm.      Pulses: Normal pulses.      Heart sounds: Normal heart sounds.   Pulmonary:      Effort: Pulmonary effort is normal.      Breath sounds: Normal breath sounds. "   Musculoskeletal:        Arms:       Left knee: Normal range of motion. Tenderness present.      Comments: Right shoulder and upper arm are tender to palpation with decreased range of motion, described as sharp pain    The left knee is tender to palpation over the entire knee, described as achy   Skin:     General: Skin is warm.   Neurological:      Mental Status: She is alert and oriented to person, place, and time.        Result Review :                 Assessment and Plan    Diagnoses and all orders for this visit:    1. Acute pain of right shoulder (Primary)  -     Ambulatory Referral to Orthopedic Surgery    2. Chronic pain of left knee  -     Ambulatory Referral to Orthopedic Surgery    3. Chronic pain of both knees  -     Diclofenac Sodium (VOLTAREN) 1 % gel gel; Apply 4 g topically to the appropriate area as directed 4 (Four) Times a Day.  Dispense: 100 g; Refill: 3    continue with current supportive care, see ortho       Follow Up   Return as needed.  Patient was given instructions and counseling regarding her condition or for health maintenance advice. Please see specific information pulled into the AVS if appropriate.

## 2023-04-18 ENCOUNTER — TELEPHONE (OUTPATIENT)
Dept: FAMILY MEDICINE CLINIC | Facility: CLINIC | Age: 68
End: 2023-04-18

## 2023-04-18 NOTE — TELEPHONE ENCOUNTER
Caller: Scarlet Gong    Relationship to patient: Self    Best call back number: [phone]    Patient is needing: PATIENT SAID SHE FORGOT TO TELL ZHEN RIOS YESTERDAY BUT SHE IS HAVING A LOT OF PAIN IN HER LEFT SHOULDER AS WELL AND WANTS HER TO INCLUDE THAT IN HER REFERRAL TO ORTHOPEDIC SURGERY    SHE SAID SHE NEEDS TO BE SEEN FOR BOTH SHOULDERS AND THE LEFT KNEE

## 2023-04-19 NOTE — TELEPHONE ENCOUNTER
Please advise her that the referral has already been sent.  She can discuss both shoulders with the doctor when seen.  Thank you

## 2023-04-20 ENCOUNTER — OFFICE VISIT (OUTPATIENT)
Dept: ORTHOPEDIC SURGERY | Facility: CLINIC | Age: 68
End: 2023-04-20
Payer: MEDICARE

## 2023-04-20 VITALS — WEIGHT: 223 LBS | OXYGEN SATURATION: 96 % | BODY MASS INDEX: 41.04 KG/M2 | HEIGHT: 62 IN

## 2023-04-20 DIAGNOSIS — M25.511 ACUTE PAIN OF RIGHT SHOULDER: Primary | ICD-10-CM

## 2023-04-20 NOTE — PROGRESS NOTES
"   Patient ID: Scarlet Gong is a 67 y.o. female presents for evaluation of a new onset of right shoulder pain that has been present for 5 weeks without any known trauma/provocation. Provocative factors lifting overhead and abducting.  Past treatments heating pad, tylenol, Voltaren gel 1%, hot showers, with some relief, no resolution.     Objective:  Ht 157.5 cm (62.01\")   Wt 101 kg (223 lb)   SpO2 96%   BMI 40.77 kg/m²     Physical Examination:  Right shoulder:  Intact skin. No ecchymposis  Tenderness over Bicep groove  Tenderness over the posterior shoulder aspect  Forward flexion 85 (painful arc 60-85), abduction 80 (painful 60-80), ER 15, IR S1  Pain and some weakness with o'cody & supraspinatus  Positive Speed    Belly press 5/5; lift off 5/5 with pain    Imaging:   XR Shoulder 2+ View Right (04/20/2023 13:24)  Findings:  There is no acute fracture or dislocation. There is mild acromioclavicular hypertrophy. The glenohumeral joint appears intact. There is maintenance of the acromiohumeral and coracoclavicular distances. The adjacent lung and ribs appear intact.     IMPRESSION:    Mild acromioclavicular joint degeneration.     Electronically Signed: Ar Horner    4/21/2023 4:40 PM EDT     Assessment:   Diagnoses and all orders for this visit:    1. Acute pain of right shoulder (Primary)  -     XR Shoulder 2+ View Right  -     Ambulatory Referral to Physical Therapy Evaluate and treat (Mobility and ROM )      Plan: Recommend OTC NSAIDs not to exceed 2,400mg/daily, formal PT to address right shoulder impingement. Follow 4 weeks.       Disclaimer: Part of this note may be an electronic transcription/translation of spoken language to printed text using the Dragon Dictation System  "

## 2023-04-24 ENCOUNTER — TELEPHONE (OUTPATIENT)
Dept: ORTHOPEDIC SURGERY | Facility: CLINIC | Age: 68
End: 2023-04-24
Payer: MEDICARE

## 2023-04-24 DIAGNOSIS — M25.511 ACUTE PAIN OF RIGHT SHOULDER: Primary | ICD-10-CM

## 2023-04-24 NOTE — TELEPHONE ENCOUNTER
Caller: Scarlet Gong    Relationship: Self    Best call back number:     What is the medical concern/diagnosis: RIGHT SHOULDER    What specialty or service is being requested: PHYSICAL THERAPY    What is the provider, practice or medical service name: [facility]    What is the office location: [facility]

## 2023-04-24 NOTE — TELEPHONE ENCOUNTER
Patient called stating when she was seen 4/21/2023 she was told that there would be a pain medication sent to pharmacy for her shoulder pain. I did no see any medication sent in.   Please advise.

## 2023-04-25 ENCOUNTER — TELEPHONE (OUTPATIENT)
Dept: FAMILY MEDICINE CLINIC | Facility: CLINIC | Age: 68
End: 2023-04-25

## 2023-04-25 NOTE — TELEPHONE ENCOUNTER
"     Caller: Scarlet Gong    Relationship: Self    Best call back number: [phone]    What medication are you requesting: SLEEP MEDICATION    What are your current symptoms: NOT SLEEPING    How long have you been experiencing symptoms: \"AWHILE\"     Have you had these symptoms before:    [x] Yes  [] No    Have you been treated for these symptoms before:   [x] Yes  [] No    If a prescription is needed, what is your preferred pharmacy and phone number: [facility] - [place], [place] -[phone] [facility], [place] - [address] [facility] [address] [phone] [place] [phone]      Additional notes:    PATIENT STATES THAT TRAZADONE DOES NOT HELP. CANNOT CONTINUE NOT BEING ABLE TO SLEEP.     "

## 2023-04-26 NOTE — TELEPHONE ENCOUNTER
Please advise MsKathy Beltran that she can try the hydroxyzine that she has for anxiety at bedtime to help her sleep, or she can try an over the counter sleep aid.  Thank you

## 2023-05-05 ENCOUNTER — OFFICE VISIT (OUTPATIENT)
Dept: FAMILY MEDICINE CLINIC | Facility: CLINIC | Age: 68
End: 2023-05-05
Payer: MEDICARE

## 2023-05-05 VITALS
WEIGHT: 222 LBS | DIASTOLIC BLOOD PRESSURE: 64 MMHG | HEART RATE: 84 BPM | RESPIRATION RATE: 18 BRPM | OXYGEN SATURATION: 95 % | SYSTOLIC BLOOD PRESSURE: 126 MMHG | BODY MASS INDEX: 40.85 KG/M2 | HEIGHT: 62 IN

## 2023-05-05 DIAGNOSIS — Z00.00 MEDICARE ANNUAL WELLNESS VISIT, INITIAL: Primary | ICD-10-CM

## 2023-05-05 NOTE — PROGRESS NOTES
"Chief Complaint  Medicare Wellness-subsequent    Subjective          Scarlet Gong presents to [facility]  History of Present Illness    Presented for [facility] visit but infomrmed up that she had already done this when a nurse came to her house recently.  She did not have any other need of a visit today.  appt cancelled.    Review of Systems      Objective   Vital Signs:  /64 (BP Location: Right arm, Patient Position: Sitting)   Pulse 84   Resp 18   Ht 157.5 cm (62.01\")   Wt 101 kg (222 lb)   SpO2 95%   BMI 40.59 kg/m²     BP Readings from Last 3 Encounters:   05/05/23 126/64   04/17/23 130/80   03/16/23 137/77        Wt Readings from Last 3 Encounters:   05/05/23 101 kg (222 lb)   04/20/23 101 kg (223 lb)   04/17/23 101 kg (223 lb)              Physical Exam   Result Review :                 Assessment and Plan    Diagnoses and all orders for this visit:    1. Medicare annual wellness visit, initial (Primary)             Follow Up   No follow-ups on file.  Patient was given instructions and counseling regarding her condition or for health maintenance advice. Please see specific information pulled into the AVS if appropriate.       "

## 2023-05-11 ENCOUNTER — HOSPITAL ENCOUNTER (OUTPATIENT)
Dept: MAMMOGRAPHY | Facility: HOSPITAL | Age: 68
Discharge: HOME | End: 2023-05-11
Admitting: NURSE PRACTITIONER
Payer: MEDICARE

## 2023-05-11 DIAGNOSIS — Z12.31 ENCOUNTER FOR SCREENING MAMMOGRAM FOR MALIGNANT NEOPLASM OF BREAST: ICD-10-CM

## 2023-05-11 PROCEDURE — 77063 BREAST TOMOSYNTHESIS BI: CPT

## 2023-05-11 PROCEDURE — 77067 SCR MAMMO BI INCL CAD: CPT

## 2023-05-15 ENCOUNTER — TELEPHONE (OUTPATIENT)
Dept: FAMILY MEDICINE CLINIC | Facility: CLINIC | Age: 68
End: 2023-05-15
Payer: MEDICARE

## 2023-05-15 NOTE — TELEPHONE ENCOUNTER
----- Message from ANGELINA Gresham sent at 5/12/2023  3:47 PM EDT -----  Please call Scarlet with mammo result -   Her screening mammogram is negative, the recommendation is to continue with routine screenings.  Thank you

## 2023-06-02 ENCOUNTER — OFFICE VISIT (OUTPATIENT)
Dept: FAMILY MEDICINE CLINIC | Facility: CLINIC | Age: 68
End: 2023-06-02

## 2023-06-02 VITALS
BODY MASS INDEX: 40.59 KG/M2 | HEART RATE: 82 BPM | OXYGEN SATURATION: 96 % | WEIGHT: 222 LBS | RESPIRATION RATE: 18 BRPM | DIASTOLIC BLOOD PRESSURE: 80 MMHG | SYSTOLIC BLOOD PRESSURE: 126 MMHG

## 2023-06-02 DIAGNOSIS — M79.605 LEG PAIN, BILATERAL: Primary | ICD-10-CM

## 2023-06-02 DIAGNOSIS — M79.604 LEG PAIN, BILATERAL: Primary | ICD-10-CM

## 2023-06-02 DIAGNOSIS — R09.89 OTHER SPECIFIED SYMPTOMS AND SIGNS INVOLVING THE CIRCULATORY AND RESPIRATORY SYSTEMS: ICD-10-CM

## 2023-06-02 PROCEDURE — 99213 OFFICE O/P EST LOW 20 MIN: CPT | Performed by: NURSE PRACTITIONER

## 2023-06-02 PROCEDURE — 1159F MED LIST DOCD IN RCRD: CPT | Performed by: NURSE PRACTITIONER

## 2023-06-02 PROCEDURE — 3074F SYST BP LT 130 MM HG: CPT | Performed by: NURSE PRACTITIONER

## 2023-06-02 PROCEDURE — 1160F RVW MEDS BY RX/DR IN RCRD: CPT | Performed by: NURSE PRACTITIONER

## 2023-06-02 PROCEDURE — 3079F DIAST BP 80-89 MM HG: CPT | Performed by: NURSE PRACTITIONER

## 2023-06-02 NOTE — PATIENT INSTRUCTIONS
$99 Cardiac Wellness Screening can be scheduled at [facility] by calling scheduling @ [phone]

## 2023-06-02 NOTE — PROGRESS NOTES
Chief Complaint  Leg Pain    Subjective          Scarlet Gong presents to [facility]  History of Present Illness    Review of Systems   Constitutional: Negative.  Negative for appetite change, fatigue and fever.   Respiratory: Negative.  Negative for shortness of breath.    Cardiovascular: Positive for leg swelling. Negative for chest pain and palpitations.        Endorses occasional ankle edema   Musculoskeletal: Positive for arthralgias and back pain.        Endorses chronic back pain, also has joint pain    Tells me that her legs ache - all over - worse when she is up a lot, improves with sitting down   Neurological: Negative for dizziness and headaches.        Numbness in her toes, present for a long time   Psychiatric/Behavioral: Positive for sleep disturbance.        Sleep is disrupted by knee pain     Objective   Vital Signs:  /80 (BP Location: Right arm, Patient Position: Sitting)   Pulse 82   Resp 18   Wt 101 kg (222 lb)   SpO2 96%   BMI 40.59 kg/m²     BP Readings from Last 3 Encounters:   06/02/23 126/80   05/05/23 126/64   04/17/23 130/80        Wt Readings from Last 3 Encounters:   06/02/23 101 kg (222 lb)   05/05/23 101 kg (222 lb)   04/20/23 101 kg (223 lb)              Physical Exam  Vitals reviewed.   Constitutional:       Appearance: Normal appearance.   Neck:      Vascular: No carotid bruit.   Cardiovascular:      Rate and Rhythm: Normal rate and regular rhythm.      Pulses: Normal pulses.      Heart sounds: Normal heart sounds.   Pulmonary:      Effort: Pulmonary effort is normal.      Breath sounds: Normal breath sounds.   Musculoskeletal:         General: Normal range of motion.      Cervical back: Neck supple.      Right lower leg: No edema.      Left lower leg: No edema.   Skin:     General: Skin is warm.   Neurological:      Mental Status: She is alert and oriented to person, place, and time.   Psychiatric:         Mood and Affect: Mood  normal.        Result Review :                 Assessment and Plan    Diagnoses and all orders for this visit:    1. Leg pain, bilateral (Primary)  -     US Ankle / Brachial Indices Extremity Complete; Future    2. Other specified symptoms and signs involving the circulatory and respiratory systems  -     US Ankle / Brachial Indices Extremity Complete; Future           Follow Up   Return in about 5 months (around 11/2/2023), or as needed, for Recheck HTN.  Patient was given instructions and counseling regarding her condition or for health maintenance advice. Please see specific information pulled into the AVS if appropriate.

## 2023-06-09 ENCOUNTER — TELEPHONE (OUTPATIENT)
Dept: FAMILY MEDICINE CLINIC | Facility: CLINIC | Age: 68
End: 2023-06-09
Payer: MEDICARE

## 2023-06-09 DIAGNOSIS — R09.89 OTHER SPECIFIED SYMPTOMS AND SIGNS INVOLVING THE CIRCULATORY AND RESPIRATORY SYSTEMS: ICD-10-CM

## 2023-06-09 DIAGNOSIS — M79.604 LEG PAIN, BILATERAL: Primary | ICD-10-CM

## 2023-06-09 DIAGNOSIS — M79.605 LEG PAIN, BILATERAL: Primary | ICD-10-CM

## 2023-06-09 NOTE — TELEPHONE ENCOUNTER
Brittani,  This is what they sent back about this procedure 06.09.23 - Per radiology protocol the order needs to be corrected to DOPPLER ANKLE BRACHIAL INDEX SINGLE LEVEL CAR in order for the pt to be scheduled at [facility]. Please close this order/referral and send the corrected order. - Thank you!

## 2023-07-24 ENCOUNTER — TELEPHONE (OUTPATIENT)
Dept: FAMILY MEDICINE CLINIC | Facility: CLINIC | Age: 68
End: 2023-07-24
Payer: MEDICARE

## 2023-07-24 DIAGNOSIS — F41.9 ANXIETY: Primary | ICD-10-CM

## 2023-07-24 RX ORDER — HYDROXYZINE 50 MG/1
50 TABLET, FILM COATED ORAL 2 TIMES DAILY
Qty: 60 TABLET | Refills: 5 | Status: SHIPPED | OUTPATIENT
Start: 2023-07-24

## 2023-07-24 NOTE — TELEPHONE ENCOUNTER
Caller: Scarlet Gong    Relationship to patient: Self    Best call back number: [phone]    Patient is needing: PATIENT IS REQUESTING A REFILL ON HER ANXIETY MEDICATION AND WANT TO GO BACK TO 50 MGS A PILL

## 2023-09-10 NOTE — PROGRESS NOTES
Encounter Date:09/19/2023    Last seen-3/16/2023      Patient ID: Scarlet Gong is a 68 y.o. female.    Chief Complaint:    Chest discomfort  Hypertension  Dyslipidemia  Hypomagnesemia     History of Present Illness     Since I have last seen, the patient has been without any chest discomfort ,shortness of breath, palpitations, dizziness or syncope.  Denies having any headache ,abdominal pain ,nausea, vomiting , diarrhea constipation, loss of weight or loss of appetite.  Denies having any excessive bruising ,hematuria or blood in the stool.    Review of all systems negative except as indicated.    Reviewed ROS.    Assessment and Plan          ]]]]]]]]]]]]]]]]  History  ======  -Chest discomfort-improved.    History of positive stress Cardiolite test prior to cardiac catheterization    Echocardiogram 1/29/2023   Left ventricular systolic function is normal. Left ventricular ejection fraction appears to be 61 - 65%.  •  Left ventricular wall thickness is consistent with mild concentric hypertrophy.  •  Left ventricular diastolic function was normal.  •  Estimated right ventricular systolic pressure from tricuspid regurgitation is normal (<35 mmHg). Calculated right ventricular systolic pressure from tricuspid regurgitation is 29 mmHg.    Cardiac catheterization 1/30/2023  No evidence for pulmonary hypertension.  Normal left ventricular size and contractility with ejection fraction of 60%.  Normal epicardial coronary arteries except for 50% proximal diagonal branch disease.     Cardiac catheterization 3/4/2020 revealed  Normal left ventricular size and contractility with ejection fraction of 60%.  Normal epicardial coronary arteries.     Mild elevation of [id number] CT scan of the chest.     Hypertension dyslipidemia asthma.  Obstructive sleep apnea     Significant hypomagnesemia     Exogenous obesity.     Previous COVID-19 infection     No significant surgical problems     Family history of coronary artery  disease     Former smoker     Allergic to Z-Bruce     ============  Plan  ========  Chest discomfort-improved  EKG showed sinus rhythm without ischemic changes-9/19/2023    Shortness of breath likely due to COPD.  Close follow-up with pulmonologist Dr. Fontana.     Hypertension-156/79  Continue hydralazine hydrochlorothiazide metoprolol  Patient was rushing here  Maintain blood pressure log.     History of hypomagnesemia-improved.     Medications were reviewed and updated.     Follow-up in the office in 6 months.     Further plan will depend on patient's progress.    Reviewed and updated-9/19/2023  ]]]]]]]]]]]]           Diagnosis Plan   1. Chest discomfort        2. Shortness of breath        3. Essential hypertension        4. Leg pain, bilateral        LAB RESULTS (LAST 7 DAYS)    CBC        BMP        CMP         BNP        TROPONIN        CoAg        Creatinine Clearance  CrCl cannot be calculated (Patient's most recent lab result is older than the maximum 30 days allowed.).    ABG        Radiology  No radiology results for the last day                The following portions of the patient's history were reviewed and updated as appropriate: allergies, current medications, past family history, past medical history, past social history, past surgical history, and problem list.    Review of Systems   Constitutional: Negative for malaise/fatigue.   Cardiovascular:  Negative for chest pain, dyspnea on exertion, leg swelling and palpitations.   Respiratory:  Positive for shortness of breath. Negative for cough.    Gastrointestinal:  Positive for nausea. Negative for abdominal pain and vomiting.   Neurological:  Positive for dizziness and light-headedness. Negative for focal weakness, headaches and numbness.   All other systems reviewed and are negative.      Current Outpatient Medications:   •  albuterol sulfate  (90 Base) MCG/ACT inhaler, Inhale 2 puffs Every 6 (Six) Hours As Needed., Disp: , Rfl:   •  ascorbic  "acid (VITAMIN C) 500 MG tablet, Take 1 tablet by mouth Daily., Disp: , Rfl:   •  aspirin 81 MG EC tablet, Take 1 tablet by mouth Daily., Disp: , Rfl:   •  calcium carbonate (OS-JEANA) 600 MG tablet, Take 1 tablet by mouth Daily., Disp: , Rfl:   •  Cholecalciferol 50 MCG (2000 UT) capsule, Take 2,000 Int'l Units by mouth., Disp: , Rfl:   •  Diclofenac Sodium (VOLTAREN) 1 % gel gel, Apply 4 g topically to the appropriate area as directed 4 (Four) Times a Day., Disp: 100 g, Rfl: 3  •  ferrous sulfate 325 (65 FE) MG tablet, Take 1 tablet by mouth Daily With Breakfast., Disp: , Rfl:   •  hydrALAZINE (APRESOLINE) 50 MG tablet, Take 1.5 tablets by mouth 2 (Two) Times a Day., Disp: , Rfl:   •  hydroCHLOROthiazide (HYDRODIURIL) 50 MG tablet, Take 1 tablet by mouth Daily., Disp: , Rfl:   •  hydrocortisone 2.5 % ointment, Apply a thin layer to affected area twice daily x 2 weeks when flared, Disp: , Rfl:   •  hydrOXYzine (ATARAX) 50 MG tablet, Take 1 tablet by mouth 2 (Two) Times a Day., Disp: 60 tablet, Rfl: 5  •  meloxicam (Mobic) 7.5 MG tablet, Take 1 tablet by mouth Daily As Needed for Moderate Pain., Disp: 30 tablet, Rfl: 0  •  metoprolol succinate XL (TOPROL-XL) 25 MG 24 hr tablet, Take 1 tablet by mouth Daily., Disp: 30 tablet, Rfl: 0  •  Potassium 99 MG tablet, Take 1 tablet by mouth 2 (Two) Times a Day., Disp: , Rfl:   •  Vit C-Cholecalciferol-Bhanu Hip (VITAMIN C & D3/BHANU HIPS PO), Take 1,000 mg by mouth Daily., Disp: , Rfl:   •  zinc gluconate 50 MG tablet, Take 1 tablet by mouth Daily., Disp: , Rfl:     Allergies   Allergen Reactions   • Prednisone Rash   • Atorvastatin Itching   • Lisinopril Cough   • Azithromycin Rash     Re-entered from allergy \"Z-Pack\"       Family History   Problem Relation Age of Onset   • Hypertension Mother    • Heart disease Sister    • Heart disease Brother    • Colon cancer Maternal Grandmother        Past Surgical History:   Procedure Laterality Date   • CARDIAC CATHETERIZATION N/A " 03/04/2020    Procedure: Left Heart Cath and coronary angiogram;  Surgeon: Hien Camacho MD;  Location:  [facility] CATH INVASIVE LOCATION;  Service: Cardiovascular;  Laterality: N/A;   • CARDIAC CATHETERIZATION N/A 03/04/2020    Procedure: Left ventriculography;  Surgeon: Hien Camacho MD;  Location:  [facility] CATH INVASIVE LOCATION;  Service: Cardiovascular;  Laterality: N/A;   • CARDIAC CATHETERIZATION N/A 1/30/2023    Procedure: Left Heart Cath and coronary angiogram;  Surgeon: Hien Camacho MD;  Location:  [facility] CATH INVASIVE LOCATION;  Service: Cardiovascular;  Laterality: N/A;   • CARDIAC CATHETERIZATION N/A 1/30/2023    Procedure: Right Heart Cath;  Surgeon: Hien Camacho MD;  Location: [facility] CATH INVASIVE LOCATION;  Service: Cardiovascular;  Laterality: N/A;       Past Medical History:   Diagnosis Date   • Asthma 04/27/2016   • Hyperlipidemia 03/02/2020   • Hypertension 03/02/2020   • Obesity (BMI 30-39.9) 03/02/2020   • Osteopenia 03/02/2020       Family History   Problem Relation Age of Onset   • Hypertension Mother    • Heart disease Sister    • Heart disease Brother    • Colon cancer Maternal Grandmother        Social History     Socioeconomic History   • Marital status:    Tobacco Use   • Smoking status: Former     Packs/day: 1.50     Years: 32.00     Pack years: 48.00     Types: Cigarettes     Passive exposure: Past   • Smokeless tobacco: Never   Vaping Use   • Vaping Use: Never used   Substance and Sexual Activity   • Alcohol use: Never   • Drug use: Never   • Sexual activity: Defer           ECG 12 Lead    Date/Time: 9/19/2023 3:43 PM  Performed by: Hien Camacho MD  Authorized by: Hien Camacho MD   Comparison: compared with previous ECG   Similar to previous ECG  Comparison to previous ECG: Normal sinus rhythm nonspecific ST-T wave changes normal axis normal intervals no ectopy no significant change from previous EKG        Objective:       Physical Exam    There were no  vitals taken for this visit.  The patient is alert, oriented and in no distress.    Vital signs as noted above.  Exogenous obesity (BMI 41)    Head and neck revealed no carotid bruits or jugular venous distension.  No thyromegaly or lymphadenopathy is present.    Lungs clear.  No wheezing.  Breath sounds are normal bilaterally.    Heart normal first and second heart sounds.  No murmur..  No pericardial rub is present.  No gallop is present.    Abdomen soft and nontender.  No organomegaly is present.    Extremities revealed good peripheral pulses without any pedal edema.    Skin warm and dry.    Musculoskeletal system is grossly normal.    CNS grossly normal.    Reviewed and updated.

## 2023-09-19 ENCOUNTER — OFFICE VISIT (OUTPATIENT)
Dept: CARDIOLOGY | Facility: CLINIC | Age: 68
End: 2023-09-19
Payer: MEDICARE

## 2023-09-19 VITALS
SYSTOLIC BLOOD PRESSURE: 156 MMHG | HEART RATE: 77 BPM | BODY MASS INDEX: 41.59 KG/M2 | DIASTOLIC BLOOD PRESSURE: 79 MMHG | WEIGHT: 226 LBS | OXYGEN SATURATION: 94 % | HEIGHT: 62 IN

## 2023-09-19 DIAGNOSIS — R07.89 CHEST DISCOMFORT: Primary | ICD-10-CM

## 2023-09-19 DIAGNOSIS — M79.604 LEG PAIN, BILATERAL: ICD-10-CM

## 2023-09-19 DIAGNOSIS — I10 ESSENTIAL HYPERTENSION: ICD-10-CM

## 2023-09-19 DIAGNOSIS — M79.605 LEG PAIN, BILATERAL: ICD-10-CM

## 2023-09-19 DIAGNOSIS — R06.02 SHORTNESS OF BREATH: ICD-10-CM

## 2023-09-19 PROCEDURE — 1159F MED LIST DOCD IN RCRD: CPT | Performed by: INTERNAL MEDICINE

## 2023-09-19 PROCEDURE — 93000 ELECTROCARDIOGRAM COMPLETE: CPT | Performed by: INTERNAL MEDICINE

## 2023-09-19 PROCEDURE — 3078F DIAST BP <80 MM HG: CPT | Performed by: INTERNAL MEDICINE

## 2023-09-19 PROCEDURE — 99214 OFFICE O/P EST MOD 30 MIN: CPT | Performed by: INTERNAL MEDICINE

## 2023-09-19 PROCEDURE — 3077F SYST BP >= 140 MM HG: CPT | Performed by: INTERNAL MEDICINE

## 2023-09-19 PROCEDURE — 1160F RVW MEDS BY RX/DR IN RCRD: CPT | Performed by: INTERNAL MEDICINE

## 2023-09-19 RX ORDER — FLUOROURACIL 50 MG/G
CREAM TOPICAL
COMMUNITY
Start: 2023-08-25

## 2023-11-07 ENCOUNTER — APPOINTMENT (OUTPATIENT)
Dept: CT IMAGING | Facility: HOSPITAL | Age: 68
End: 2023-11-07
Payer: MEDICARE

## 2023-11-07 ENCOUNTER — HOSPITAL ENCOUNTER (OUTPATIENT)
Facility: HOSPITAL | Age: 68
Setting detail: OBSERVATION
Discharge: HOME | End: 2023-11-09
Attending: EMERGENCY MEDICINE | Admitting: EMERGENCY MEDICINE
Payer: MEDICARE

## 2023-11-07 DIAGNOSIS — R07.9 CHEST PAIN, UNSPECIFIED TYPE: Primary | ICD-10-CM

## 2023-11-07 DIAGNOSIS — R03.0 ELEVATED BLOOD PRESSURE READING: ICD-10-CM

## 2023-11-07 DIAGNOSIS — M54.9 ACUTE BACK PAIN, UNSPECIFIED BACK LOCATION, UNSPECIFIED BACK PAIN LATERALITY: ICD-10-CM

## 2023-11-07 LAB
ALBUMIN SERPL-MCNC: 4.3 G/DL (ref 3.5–5.2)
ALBUMIN/GLOB SERPL: 1.3 G/DL
ALP SERPL-CCNC: 86 U/L (ref 39–117)
ALT SERPL W P-5'-P-CCNC: 25 U/L (ref 1–33)
ANION GAP SERPL CALCULATED.3IONS-SCNC: 12 MMOL/L (ref 5–15)
APTT PPP: 30.2 SECONDS (ref 61–76.5)
AST SERPL-CCNC: 23 U/L (ref 1–32)
BASOPHILS # BLD AUTO: 0.1 10*3/MM3 (ref 0–0.2)
BASOPHILS NFR BLD AUTO: 0.5 % (ref 0–1.5)
BILIRUB SERPL-MCNC: 0.3 MG/DL (ref 0–1.2)
BUN SERPL-MCNC: 17 MG/DL (ref 8–23)
BUN/CREAT SERPL: 22.7 (ref 7–25)
CALCIUM SPEC-SCNC: 10.4 MG/DL (ref 8.6–10.5)
CHLORIDE SERPL-SCNC: 99 MMOL/L (ref 98–107)
CO2 SERPL-SCNC: 28 MMOL/L (ref 22–29)
CREAT BLDA-MCNC: 0.7 MG/DL (ref 0.6–1.3)
CREAT SERPL-MCNC: 0.75 MG/DL (ref 0.57–1)
DEPRECATED RDW RBC AUTO: 44.6 FL (ref 37–54)
EGFRCR SERPLBLD CKD-EPI 2021: 87.4 ML/MIN/1.73
EGFRCR SERPLBLD CKD-EPI 2021: 94.9 ML/MIN/1.73
EOSINOPHIL # BLD AUTO: 0.2 10*3/MM3 (ref 0–0.4)
EOSINOPHIL NFR BLD AUTO: 2.1 % (ref 0.3–6.2)
ERYTHROCYTE [DISTWIDTH] IN BLOOD BY AUTOMATED COUNT: 14.9 % (ref 12.3–15.4)
GLOBULIN UR ELPH-MCNC: 3.3 GM/DL
GLUCOSE SERPL-MCNC: 124 MG/DL (ref 65–99)
HCT VFR BLD AUTO: 39.2 % (ref 34–46.6)
HGB BLD-MCNC: 12.7 G/DL (ref 12–15.9)
INR PPP: 1 (ref 0.93–1.1)
LIPASE SERPL-CCNC: 26 U/L (ref 13–60)
LYMPHOCYTES # BLD AUTO: 3 10*3/MM3 (ref 0.7–3.1)
LYMPHOCYTES NFR BLD AUTO: 26.9 % (ref 19.6–45.3)
MCH RBC QN AUTO: 28 PG (ref 26.6–33)
MCHC RBC AUTO-ENTMCNC: 32.3 G/DL (ref 31.5–35.7)
MCV RBC AUTO: 86.8 FL (ref 79–97)
MONOCYTES # BLD AUTO: 0.7 10*3/MM3 (ref 0.1–0.9)
MONOCYTES NFR BLD AUTO: 6.7 % (ref 5–12)
NEUTROPHILS NFR BLD AUTO: 63.8 % (ref 42.7–76)
NEUTROPHILS NFR BLD AUTO: 7.1 10*3/MM3 (ref 1.7–7)
NRBC BLD AUTO-RTO: 0 /100 WBC (ref 0–0.2)
PLATELET # BLD AUTO: 351 10*3/MM3 (ref 140–450)
PMV BLD AUTO: 6.7 FL (ref 6–12)
POTASSIUM SERPL-SCNC: 4.1 MMOL/L (ref 3.5–5.2)
PROT SERPL-MCNC: 7.6 G/DL (ref 6–8.5)
PROTHROMBIN TIME: 10.9 SECONDS (ref 9.6–11.7)
RBC # BLD AUTO: 4.52 10*6/MM3 (ref 3.77–5.28)
SODIUM SERPL-SCNC: 139 MMOL/L (ref 136–145)
TROPONIN T SERPL HS-MCNC: 11 NG/L
TROPONIN T SERPL HS-MCNC: 11 NG/L
WBC NRBC COR # BLD: 11.2 10*3/MM3 (ref 3.4–10.8)

## 2023-11-07 PROCEDURE — 25010000002 MORPHINE PER 10 MG: Performed by: NURSE PRACTITIONER

## 2023-11-07 PROCEDURE — 25010000002 NITROGLYCERIN 200 MCG/ML SOLUTION: Performed by: NURSE PRACTITIONER

## 2023-11-07 PROCEDURE — G0378 HOSPITAL OBSERVATION PER HR: HCPCS

## 2023-11-07 PROCEDURE — 36415 COLL VENOUS BLD VENIPUNCTURE: CPT

## 2023-11-07 PROCEDURE — 25010000002 ONDANSETRON PER 1 MG: Performed by: NURSE PRACTITIONER

## 2023-11-07 PROCEDURE — 96376 TX/PRO/DX INJ SAME DRUG ADON: CPT

## 2023-11-07 PROCEDURE — 83690 ASSAY OF LIPASE: CPT | Performed by: NURSE PRACTITIONER

## 2023-11-07 PROCEDURE — 82565 ASSAY OF CREATININE: CPT

## 2023-11-07 PROCEDURE — 25510000001 IOPAMIDOL PER 1 ML: Performed by: NURSE PRACTITIONER

## 2023-11-07 PROCEDURE — 25810000003 SODIUM CHLORIDE 0.9 % SOLUTION: Performed by: NURSE PRACTITIONER

## 2023-11-07 PROCEDURE — 99285 EMERGENCY DEPT VISIT HI MDM: CPT

## 2023-11-07 PROCEDURE — 93005 ELECTROCARDIOGRAM TRACING: CPT | Performed by: NURSE PRACTITIONER

## 2023-11-07 PROCEDURE — 85025 COMPLETE CBC W/AUTO DIFF WBC: CPT | Performed by: NURSE PRACTITIONER

## 2023-11-07 PROCEDURE — 96366 THER/PROPH/DIAG IV INF ADDON: CPT

## 2023-11-07 PROCEDURE — 96375 TX/PRO/DX INJ NEW DRUG ADDON: CPT

## 2023-11-07 PROCEDURE — 85730 THROMBOPLASTIN TIME PARTIAL: CPT | Performed by: NURSE PRACTITIONER

## 2023-11-07 PROCEDURE — 80053 COMPREHEN METABOLIC PANEL: CPT | Performed by: NURSE PRACTITIONER

## 2023-11-07 PROCEDURE — 84484 ASSAY OF TROPONIN QUANT: CPT | Performed by: NURSE PRACTITIONER

## 2023-11-07 PROCEDURE — 85610 PROTHROMBIN TIME: CPT | Performed by: NURSE PRACTITIONER

## 2023-11-07 PROCEDURE — 71275 CT ANGIOGRAPHY CHEST: CPT

## 2023-11-07 PROCEDURE — 96365 THER/PROPH/DIAG IV INF INIT: CPT

## 2023-11-07 RX ORDER — POLYETHYLENE GLYCOL 3350 17 G/17G
17 POWDER, FOR SOLUTION ORAL DAILY PRN
Status: DISCONTINUED | OUTPATIENT
Start: 2023-11-07 | End: 2023-11-09 | Stop reason: HOSPADM

## 2023-11-07 RX ORDER — SODIUM CHLORIDE 0.9 % (FLUSH) 0.9 %
10 SYRINGE (ML) INJECTION AS NEEDED
Status: DISCONTINUED | OUTPATIENT
Start: 2023-11-07 | End: 2023-11-09 | Stop reason: HOSPADM

## 2023-11-07 RX ORDER — ASPIRIN 81 MG/1
324 TABLET, CHEWABLE ORAL ONCE
Status: COMPLETED | OUTPATIENT
Start: 2023-11-07 | End: 2023-11-07

## 2023-11-07 RX ORDER — BISACODYL 5 MG/1
5 TABLET, DELAYED RELEASE ORAL DAILY PRN
Status: DISCONTINUED | OUTPATIENT
Start: 2023-11-07 | End: 2023-11-09 | Stop reason: HOSPADM

## 2023-11-07 RX ORDER — HYDROXYZINE HYDROCHLORIDE 25 MG/1
50 TABLET, FILM COATED ORAL ONCE
Status: COMPLETED | OUTPATIENT
Start: 2023-11-07 | End: 2023-11-07

## 2023-11-07 RX ORDER — LIDOCAINE 50 MG/G
1 PATCH TOPICAL
Status: DISCONTINUED | OUTPATIENT
Start: 2023-11-07 | End: 2023-11-08

## 2023-11-07 RX ORDER — LABETALOL HYDROCHLORIDE 5 MG/ML
10 INJECTION, SOLUTION INTRAVENOUS ONCE
Status: DISCONTINUED | OUTPATIENT
Start: 2023-11-07 | End: 2023-11-08

## 2023-11-07 RX ORDER — ONDANSETRON 2 MG/ML
4 INJECTION INTRAMUSCULAR; INTRAVENOUS ONCE
Status: COMPLETED | OUTPATIENT
Start: 2023-11-07 | End: 2023-11-07

## 2023-11-07 RX ORDER — HYDROCHLOROTHIAZIDE 25 MG/1
50 TABLET ORAL ONCE
Status: COMPLETED | OUTPATIENT
Start: 2023-11-07 | End: 2023-11-07

## 2023-11-07 RX ORDER — ACETAMINOPHEN 325 MG/1
650 TABLET ORAL EVERY 4 HOURS PRN
Status: DISCONTINUED | OUTPATIENT
Start: 2023-11-07 | End: 2023-11-09 | Stop reason: HOSPADM

## 2023-11-07 RX ORDER — NITROGLYCERIN 20 MG/100ML
5-200 INJECTION INTRAVENOUS
Status: DISCONTINUED | OUTPATIENT
Start: 2023-11-07 | End: 2023-11-09 | Stop reason: HOSPADM

## 2023-11-07 RX ORDER — SODIUM CHLORIDE 0.9 % (FLUSH) 0.9 %
10 SYRINGE (ML) INJECTION EVERY 12 HOURS SCHEDULED
Status: DISCONTINUED | OUTPATIENT
Start: 2023-11-07 | End: 2023-11-09 | Stop reason: HOSPADM

## 2023-11-07 RX ORDER — AMOXICILLIN 250 MG
2 CAPSULE ORAL 2 TIMES DAILY
Status: DISCONTINUED | OUTPATIENT
Start: 2023-11-07 | End: 2023-11-09 | Stop reason: HOSPADM

## 2023-11-07 RX ORDER — ONDANSETRON 2 MG/ML
4 INJECTION INTRAMUSCULAR; INTRAVENOUS EVERY 6 HOURS PRN
Status: DISCONTINUED | OUTPATIENT
Start: 2023-11-07 | End: 2023-11-09 | Stop reason: HOSPADM

## 2023-11-07 RX ORDER — BISACODYL 10 MG
10 SUPPOSITORY, RECTAL RECTAL DAILY PRN
Status: DISCONTINUED | OUTPATIENT
Start: 2023-11-07 | End: 2023-11-09 | Stop reason: HOSPADM

## 2023-11-07 RX ORDER — CHOLECALCIFEROL (VITAMIN D3) 125 MCG
5 CAPSULE ORAL NIGHTLY PRN
Status: DISCONTINUED | OUTPATIENT
Start: 2023-11-07 | End: 2023-11-09 | Stop reason: HOSPADM

## 2023-11-07 RX ORDER — SODIUM CHLORIDE 9 MG/ML
40 INJECTION, SOLUTION INTRAVENOUS AS NEEDED
Status: DISCONTINUED | OUTPATIENT
Start: 2023-11-07 | End: 2023-11-09 | Stop reason: HOSPADM

## 2023-11-07 RX ADMIN — IOPAMIDOL 100 ML: 755 INJECTION, SOLUTION INTRAVENOUS at 16:53

## 2023-11-07 RX ADMIN — ONDANSETRON 4 MG: 2 INJECTION INTRAMUSCULAR; INTRAVENOUS at 15:46

## 2023-11-07 RX ADMIN — MORPHINE SULFATE 4 MG: 4 INJECTION, SOLUTION INTRAMUSCULAR; INTRAVENOUS at 15:46

## 2023-11-07 RX ADMIN — HYDROCHLOROTHIAZIDE 50 MG: 25 TABLET ORAL at 21:31

## 2023-11-07 RX ADMIN — ASPIRIN 81 MG CHEWABLE TABLET 324 MG: 81 TABLET CHEWABLE at 18:12

## 2023-11-07 RX ADMIN — LIDOCAINE 1 PATCH: 50 PATCH TOPICAL at 19:09

## 2023-11-07 RX ADMIN — MORPHINE SULFATE 4 MG: 4 INJECTION, SOLUTION INTRAMUSCULAR; INTRAVENOUS at 19:09

## 2023-11-07 RX ADMIN — HYDROXYZINE HYDROCHLORIDE 50 MG: 25 TABLET, FILM COATED ORAL at 21:28

## 2023-11-07 RX ADMIN — SODIUM CHLORIDE 500 ML: 9 INJECTION, SOLUTION INTRAVENOUS at 15:45

## 2023-11-07 RX ADMIN — NITROGLYCERIN 10 MCG/MIN: 20 INJECTION INTRAVENOUS at 18:13

## 2023-11-07 RX ADMIN — Medication 10 ML: at 20:12

## 2023-11-07 NOTE — ED PROVIDER NOTES
"Subjective   History of Present Illness  Chief complaint: Back pain      Context: 68-year-old female presents with back pain radiating into her chest with elevated blood pressure.  She states she has been compliantly taking all her medication and it typically runs 120s.  She denies any recent viral illness.  She took some Tylenol without relief.  Denies any bowel or bladder incontinence retention saddle anesthesia or fever.  No recent trauma or falls.        PCP:grace camacho        Review of Systems   Constitutional:  Negative for fever.       Past Medical History:   Diagnosis Date    Asthma 04/27/2016    Hyperlipidemia 03/02/2020    Hypertension 03/02/2020    Obesity (BMI 30-39.9) 03/02/2020    Osteopenia 03/02/2020       Allergies   Allergen Reactions    Prednisone Rash    Atorvastatin Itching    Lisinopril Cough    Azithromycin Rash     Re-entered from allergy \"Z-Pack\"       Past Surgical History:   Procedure Laterality Date    CARDIAC CATHETERIZATION N/A 03/04/2020    Procedure: Left Heart Cath and coronary angiogram;  Surgeon: Hien Camacho MD;  Location: [facility] CATH INVASIVE LOCATION;  Service: Cardiovascular;  Laterality: N/A;    CARDIAC CATHETERIZATION N/A 03/04/2020    Procedure: Left ventriculography;  Surgeon: Hien Camacho MD;  Location: [facility] CATH INVASIVE LOCATION;  Service: Cardiovascular;  Laterality: N/A;    CARDIAC CATHETERIZATION N/A 1/30/2023    Procedure: Left Heart Cath and coronary angiogram;  Surgeon: Hien Camacho MD;  Location: [facility] CATH INVASIVE LOCATION;  Service: Cardiovascular;  Laterality: N/A;    CARDIAC CATHETERIZATION N/A 1/30/2023    Procedure: Right Heart Cath;  Surgeon: Hien Camacho MD;  Location: [facility] CATH INVASIVE LOCATION;  Service: Cardiovascular;  Laterality: N/A;       Family History   Problem Relation Age of Onset    Hypertension Mother     Heart disease Sister     Heart disease Brother     Colon cancer Maternal Grandmother        Social History "     Socioeconomic History    Marital status:    Tobacco Use    Smoking status: Former     Packs/day: 1.50     Years: 32.00     Additional pack years: 0.00     Total pack years: 48.00     Types: Cigarettes     Passive exposure: Past    Smokeless tobacco: Never   Vaping Use    Vaping Use: Never used   Substance and Sexual Activity    Alcohol use: Never    Drug use: Never    Sexual activity: Defer           Objective   Physical Exam    Vital signs and traige nurse note reviewed.  Constitutional:  Awake, alert; well developed and well nourished.  Uncomfortable  HEENT:  Normocephalic, atraumatic; pupils are PERRL with intact EOM; oropharynx is pink and moist without edema or erythema.  Neck: Supple, full range of motion without pain;   Cardiovascular: Regular rate and rhythm, normal S1-S2. .  Pulmonary: Respiratory effort regular, nonlabored; breath sounds CTA without wheezing or rhonchi.  Abdomen: Soft, nontender, nondistended with normoactive bowel sounds; no rebound or guarding.    Musculoskeletal: Independent range of motion of all extremities, no palpable tenderness or edema.   Back:  Spine is midline and without midline tenderness on palpation of cervical, thoracic, and lumbar spine; paraspinal musculature is tender right trapezius and without soft tissue swelling, overlying ecchymosis or erythema. ROM is without pain,  Distal muscle strength is 5/5.  Neuro: Alert oriented x3, speech is clear and appropriate. strong and equal dorsiflexion of bilateral great toes L4, L5, S1 motor sensory intact.      Procedures           ED Course  ED Course as of 11/07/23 1859   Tue Nov 07, 2023   1617 Waiting on pt to go to ct   [JW]      ED Course User Index  [JW] Yazmin Poon APRN           Labs Reviewed   COMPREHENSIVE METABOLIC PANEL - Abnormal; Notable for the following components:       Result Value    Glucose 124 (*)     All other components within normal limits    Narrative:     GFR Normal >60  Chronic Kidney  Disease <60  Kidney Failure <15     APTT - Abnormal; Notable for the following components:    PTT 30.2 (*)     All other components within normal limits   CBC WITH AUTO DIFFERENTIAL - Abnormal; Notable for the following components:    WBC 11.20 (*)     Neutrophils, Absolute 7.10 (*)     All other components within normal limits   LIPASE - Normal   PROTIME-INR - Normal   SINGLE HSTROPONIN T - Normal    Narrative:     High Sensitive Troponin T Reference Range:  <14.0 ng/L- Negative Female for AMI  <22.0 ng/L- Negative Male for AMI  >=14 - Abnormal Female indicating possible myocardial injury.  >=22 - Abnormal Male indicating possible myocardial injury.   Clinicians would have to utilize clinical acumen, EKG, Troponin, and serial changes to determine if it is an Acute Myocardial Infarction or myocardial injury due to an underlying chronic condition.        SINGLE HSTROPONIN T - Normal    Narrative:     High Sensitive Troponin T Reference Range:  <14.0 ng/L- Negative Female for AMI  <22.0 ng/L- Negative Male for AMI  >=14 - Abnormal Female indicating possible myocardial injury.  >=22 - Abnormal Male indicating possible myocardial injury.   Clinicians would have to utilize clinical acumen, EKG, Troponin, and serial changes to determine if it is an Acute Myocardial Infarction or myocardial injury due to an underlying chronic condition.        POCT CREATININE - Normal   CBC AND DIFFERENTIAL    Narrative:     The following orders were created for panel order CBC & Differential.  Procedure                               Abnormality         Status                     ---------                               -----------         ------                     CBC Auto Differential[[id number]]        Abnormal            Final result                 Please view results for these tests on the individual orders.     Medications   sodium chloride 0.9 % flush 10 mL (has no administration in time range)   labetalol (NORMODYNE,TRANDATE)  injection 10 mg (10 mg Intravenous Not Given 11/7/23 1724)   nitroglycerin (TRIDIL) 200 mcg/ml infusion (15 mcg/min Intravenous Rate/Dose Change 11/7/23 1839)   sodium chloride 0.9 % flush 10 mL (has no administration in time range)   sodium chloride 0.9 % flush 10 mL (has no administration in time range)   sodium chloride 0.9 % infusion 40 mL (has no administration in time range)   acetaminophen (TYLENOL) tablet 650 mg (has no administration in time range)   sennosides-docusate (PERICOLACE) 8.6-50 MG per tablet 2 tablet (has no administration in time range)     And   polyethylene glycol (MIRALAX) packet 17 g (has no administration in time range)     And   bisacodyl (DULCOLAX) EC tablet 5 mg (has no administration in time range)     And   bisacodyl (DULCOLAX) suppository 10 mg (has no administration in time range)   ondansetron (ZOFRAN) injection 4 mg (has no administration in time range)   melatonin tablet 5 mg (has no administration in time range)   morphine injection 4 mg (has no administration in time range)   lidocaine (LIDODERM) 5 % 1 patch (has no administration in time range)   sodium chloride 0.9 % bolus 500 mL (0 mL Intravenous Stopped 11/7/23 1759)   ondansetron (ZOFRAN) injection 4 mg (4 mg Intravenous Given 11/7/23 1546)   morphine injection 4 mg (4 mg Intravenous Given 11/7/23 1546)   iopamidol (ISOVUE-370) 76 % injection 100 mL (100 mL Intravenous Given 11/7/23 1653)   aspirin chewable tablet 324 mg (324 mg Oral Given 11/7/23 1812)     CT Angiogram Chest    Result Date: 11/7/2023  1.No acute abnormality is identified within the thorax. Specifically, there is no evidence of acute pulmonary embolism. 2.No acute abnormality of the thoracic aorta. 3.Additional findings as detailed above. Electronically Signed: Po Ashraf MD  11/7/2023 5:07 PM EST  Workstation ID: [id number]   Prior to Admission medications    Medication Sig Start Date End Date Taking? Authorizing Provider   albuterol sulfate   (90 Base) MCG/ACT inhaler Inhale 2 puffs Every 6 (Six) Hours As Needed. 6/23/22  Yes Lorraine Goyal MD   aspirin 81 MG EC tablet Take 1 tablet by mouth Daily.   Yes Lorraine Goyal MD   calcium carbonate (OS-JEANA) 600 MG tablet Take 1 tablet by mouth Daily.   Yes Lorraine Goyal MD   Cholecalciferol 50 MCG (2000 UT) capsule Take 2,000 Int'l Units by mouth Daily. 12/5/20  Yes Lorraine Goyal MD   ferrous sulfate 325 (65 FE) MG tablet Take 1 tablet by mouth Daily With Breakfast.   Yes Lorraine Goyal MD   hydrALAZINE (APRESOLINE) 50 MG tablet Take 1.5 tablets by mouth 2 (Two) Times a Day. 7/6/22  Yes Lorraine Goyal MD   hydroCHLOROthiazide (HYDRODIURIL) 50 MG tablet Take 1 tablet by mouth Daily. 7/6/22  Yes Lorraine Goyal MD   hydrOXYzine (ATARAX) 50 MG tablet Take 1 tablet by mouth 2 (Two) Times a Day. 7/24/23  Yes Antoinette Bernstein APRN   metoprolol succinate XL (TOPROL-XL) 25 MG 24 hr tablet Take 1 tablet by mouth Daily. 1/30/23  Yes Christi Ramires APRN   Potassium 99 MG tablet Take 1 tablet by mouth 2 (Two) Times a Day.   Yes Lorraine Goyal MD   zinc gluconate 50 MG tablet Take 1 tablet by mouth Daily. 12/5/20  Yes Lorraine Goyal MD   ascorbic acid (VITAMIN C) 500 MG tablet Take 1 tablet by mouth Daily. 12/5/20 11/7/23  Lorraine Goyal MD   Diclofenac Sodium (VOLTAREN) 1 % gel gel Apply 4 g topically to the appropriate area as directed 4 (Four) Times a Day. 4/17/23 11/7/23  Antoinette Bernstein APRN   fluorouracil (EFUDEX) 5 % cream Apply twice daily for 2 weeks to nose, backs of hands and forearms. 8/25/23 11/7/23  Lorraine Goyal MD   hydrocortisone 2.5 % ointment Apply a thin layer to affected area twice daily x 2 weeks when flared 2/24/23 11/7/23  Lorraine Goyal MD   meloxicam (Mobic) 7.5 MG tablet Take 1 tablet by mouth Daily As Needed for Moderate Pain. 6/29/23 11/7/23  Antoinette Bernstein, APRN   Vit C-Cholecalciferol-Meg  "Hip (VITAMIN C & D3/BHANU HIPS PO) Take 1,000 mg by mouth Daily.  11/7/23  Provider, Lorraine, MD                HEART Score: 3                      Medical Decision Making      /51   Pulse 79   Temp 98.7 °F (37.1 °C) (Temporal)   Resp 16   Ht 157.5 cm (62\")   Wt 104 kg (229 lb 4.5 oz)   SpO2 96%   BMI 41.94 kg/m²      Chart review: Heart cath January 2020.  50% diagonal    Radiology interpretation: CT reviewed independently and interpreted by me: Negative for dissection  Further interpretation by radiologist as above  Lab interpretation:  Labs all viewed by me and significant for, initial troponin 11 with repeat pending glucose 124 White count 11.2    EKG viewed and interpreted by me and corroborated by Dr. Siegel, sinus rhythm rate of 82  comparison: 1/28/2023 sinus bradycardia rate of 59            Appropriate PPE worn during exam.  Patient was placed on a cardiac monitor had an IV established labs CTs obtained evaluate for STEMI non-STEMI angina dissection.  Could also be related to hypertensive crisis or musculoskeletal etiology.  Had some relief with nitro and was also given morphine.  She had a cath in January with minor 50% diagonal blockage therefore we placed in obs with cardiology consult and defer echo and stress to admitting provider.  PT order was placed to evaluate musculoskeletal etiology.         i discussed findings with patient and family who voices understanding of observation admission.  Discussed with Dr. Siegel    Problems Addressed:  Acute back pain, unspecified back location, unspecified back pain laterality: complicated acute illness or injury  Chest pain, unspecified type: complicated acute illness or injury  Elevated blood pressure reading: complicated acute illness or injury    Amount and/or Complexity of Data Reviewed  Labs: ordered.  Radiology: ordered.  ECG/medicine tests: ordered.    Risk  OTC drugs.  Prescription drug management.  Decision regarding " hospitalization.        Final diagnoses:   Chest pain, unspecified type   Elevated blood pressure reading   Acute back pain, unspecified back location, unspecified back pain laterality       ED Disposition  ED Disposition       ED Disposition   Decision to Admit    Condition   --    Comment   --               No follow-up provider specified.       Medication List      No changes were made to your prescriptions during this visit.            Yazmin Poon, APRN  11/07/23 3591

## 2023-11-08 ENCOUNTER — APPOINTMENT (OUTPATIENT)
Dept: NUCLEAR MEDICINE | Facility: HOSPITAL | Age: 68
End: 2023-11-08
Payer: MEDICARE

## 2023-11-08 LAB
ANION GAP SERPL CALCULATED.3IONS-SCNC: 12 MMOL/L (ref 5–15)
BASOPHILS # BLD AUTO: 0.1 10*3/MM3 (ref 0–0.2)
BASOPHILS NFR BLD AUTO: 1.1 % (ref 0–1.5)
BILIRUB UR QL STRIP: NEGATIVE
BUN SERPL-MCNC: 16 MG/DL (ref 8–23)
BUN/CREAT SERPL: 28.6 (ref 7–25)
CALCIUM SPEC-SCNC: 9.7 MG/DL (ref 8.6–10.5)
CHLORIDE SERPL-SCNC: 99 MMOL/L (ref 98–107)
CLARITY UR: CLEAR
CO2 SERPL-SCNC: 27 MMOL/L (ref 22–29)
COLOR UR: YELLOW
CREAT SERPL-MCNC: 0.56 MG/DL (ref 0.57–1)
DEPRECATED RDW RBC AUTO: 43.8 FL (ref 37–54)
EGFRCR SERPLBLD CKD-EPI 2021: 100.2 ML/MIN/1.73
EOSINOPHIL # BLD AUTO: 0.1 10*3/MM3 (ref 0–0.4)
EOSINOPHIL NFR BLD AUTO: 0.7 % (ref 0.3–6.2)
ERYTHROCYTE [DISTWIDTH] IN BLOOD BY AUTOMATED COUNT: 14.6 % (ref 12.3–15.4)
GLUCOSE SERPL-MCNC: 122 MG/DL (ref 65–99)
GLUCOSE UR STRIP-MCNC: NEGATIVE MG/DL
HCT VFR BLD AUTO: 35.8 % (ref 34–46.6)
HGB BLD-MCNC: 11.6 G/DL (ref 12–15.9)
HGB UR QL STRIP.AUTO: NEGATIVE
KETONES UR QL STRIP: NEGATIVE
LEUKOCYTE ESTERASE UR QL STRIP.AUTO: NEGATIVE
LYMPHOCYTES # BLD AUTO: 2.5 10*3/MM3 (ref 0.7–3.1)
LYMPHOCYTES NFR BLD AUTO: 19.4 % (ref 19.6–45.3)
MCH RBC QN AUTO: 28 PG (ref 26.6–33)
MCHC RBC AUTO-ENTMCNC: 32.5 G/DL (ref 31.5–35.7)
MCV RBC AUTO: 86.3 FL (ref 79–97)
MONOCYTES # BLD AUTO: 0.8 10*3/MM3 (ref 0.1–0.9)
MONOCYTES NFR BLD AUTO: 6.1 % (ref 5–12)
NEUTROPHILS NFR BLD AUTO: 72.7 % (ref 42.7–76)
NEUTROPHILS NFR BLD AUTO: 9.2 10*3/MM3 (ref 1.7–7)
NITRITE UR QL STRIP: NEGATIVE
NRBC BLD AUTO-RTO: 0.1 /100 WBC (ref 0–0.2)
PH UR STRIP.AUTO: 5.5 [PH] (ref 5–8)
PLATELET # BLD AUTO: 316 10*3/MM3 (ref 140–450)
PMV BLD AUTO: 7.3 FL (ref 6–12)
POTASSIUM SERPL-SCNC: 4 MMOL/L (ref 3.5–5.2)
PROT UR QL STRIP: NEGATIVE
QT INTERVAL: 354 MS
QTC INTERVAL: 414 MS
RBC # BLD AUTO: 4.15 10*6/MM3 (ref 3.77–5.28)
SODIUM SERPL-SCNC: 138 MMOL/L (ref 136–145)
SP GR UR STRIP: 1.03 (ref 1–1.03)
TROPONIN T SERPL HS-MCNC: 8 NG/L
UROBILINOGEN UR QL STRIP: ABNORMAL
WBC NRBC COR # BLD: 12.7 10*3/MM3 (ref 3.4–10.8)

## 2023-11-08 PROCEDURE — G0378 HOSPITAL OBSERVATION PER HR: HCPCS

## 2023-11-08 PROCEDURE — 96366 THER/PROPH/DIAG IV INF ADDON: CPT

## 2023-11-08 PROCEDURE — 81003 URINALYSIS AUTO W/O SCOPE: CPT | Performed by: PHYSICIAN ASSISTANT

## 2023-11-08 PROCEDURE — 93018 CV STRESS TEST I&R ONLY: CPT | Performed by: INTERNAL MEDICINE

## 2023-11-08 PROCEDURE — 78452 HT MUSCLE IMAGE SPECT MULT: CPT | Performed by: INTERNAL MEDICINE

## 2023-11-08 PROCEDURE — 80048 BASIC METABOLIC PNL TOTAL CA: CPT | Performed by: NURSE PRACTITIONER

## 2023-11-08 PROCEDURE — 84484 ASSAY OF TROPONIN QUANT: CPT | Performed by: PHYSICIAN ASSISTANT

## 2023-11-08 PROCEDURE — 85025 COMPLETE CBC W/AUTO DIFF WBC: CPT | Performed by: NURSE PRACTITIONER

## 2023-11-08 PROCEDURE — 78452 HT MUSCLE IMAGE SPECT MULT: CPT

## 2023-11-08 PROCEDURE — 99214 OFFICE O/P EST MOD 30 MIN: CPT | Performed by: INTERNAL MEDICINE

## 2023-11-08 PROCEDURE — 0 TECHNETIUM TETROFOSMIN KIT: Performed by: EMERGENCY MEDICINE

## 2023-11-08 PROCEDURE — A9502 TC99M TETROFOSMIN: HCPCS | Performed by: EMERGENCY MEDICINE

## 2023-11-08 PROCEDURE — 97162 PT EVAL MOD COMPLEX 30 MIN: CPT

## 2023-11-08 PROCEDURE — 93017 CV STRESS TEST TRACING ONLY: CPT

## 2023-11-08 RX ORDER — METHOCARBAMOL 500 MG/1
500 TABLET, FILM COATED ORAL ONCE
Status: COMPLETED | OUTPATIENT
Start: 2023-11-08 | End: 2023-11-08

## 2023-11-08 RX ORDER — LIDOCAINE 50 MG/G
1 PATCH TOPICAL
Status: COMPLETED | OUTPATIENT
Start: 2023-11-08 | End: 2023-11-08

## 2023-11-08 RX ORDER — ALBUTEROL SULFATE 2.5 MG/3ML
2.5 SOLUTION RESPIRATORY (INHALATION) EVERY 6 HOURS PRN
Status: DISCONTINUED | OUTPATIENT
Start: 2023-11-08 | End: 2023-11-09 | Stop reason: HOSPADM

## 2023-11-08 RX ORDER — HYDRALAZINE HYDROCHLORIDE 25 MG/1
75 TABLET, FILM COATED ORAL 2 TIMES DAILY
Status: DISCONTINUED | OUTPATIENT
Start: 2023-11-08 | End: 2023-11-09 | Stop reason: HOSPADM

## 2023-11-08 RX ORDER — FERROUS SULFATE 324(65)MG
324 TABLET, DELAYED RELEASE (ENTERIC COATED) ORAL
Status: DISCONTINUED | OUTPATIENT
Start: 2023-11-08 | End: 2023-11-09 | Stop reason: HOSPADM

## 2023-11-08 RX ORDER — ASPIRIN 81 MG/1
81 TABLET ORAL DAILY
Status: DISCONTINUED | OUTPATIENT
Start: 2023-11-08 | End: 2023-11-09 | Stop reason: HOSPADM

## 2023-11-08 RX ORDER — HYDROCHLOROTHIAZIDE 25 MG/1
50 TABLET ORAL DAILY
Status: DISCONTINUED | OUTPATIENT
Start: 2023-11-08 | End: 2023-11-09 | Stop reason: HOSPADM

## 2023-11-08 RX ORDER — HYDROXYZINE HYDROCHLORIDE 25 MG/1
50 TABLET, FILM COATED ORAL 2 TIMES DAILY
Status: DISCONTINUED | OUTPATIENT
Start: 2023-11-08 | End: 2023-11-09 | Stop reason: HOSPADM

## 2023-11-08 RX ORDER — METOPROLOL SUCCINATE 25 MG/1
25 TABLET, EXTENDED RELEASE ORAL
Status: DISCONTINUED | OUTPATIENT
Start: 2023-11-08 | End: 2023-11-09 | Stop reason: HOSPADM

## 2023-11-08 RX ADMIN — TETROFOSMIN 1 DOSE: 1.38 INJECTION, POWDER, LYOPHILIZED, FOR SOLUTION INTRAVENOUS at 13:06

## 2023-11-08 RX ADMIN — Medication 10 ML: at 09:20

## 2023-11-08 RX ADMIN — LIDOCAINE 1 PATCH: 50 PATCH TOPICAL at 09:20

## 2023-11-08 RX ADMIN — METHOCARBAMOL 500 MG: 500 TABLET ORAL at 05:26

## 2023-11-08 RX ADMIN — Medication 5 MG: at 21:26

## 2023-11-08 RX ADMIN — HYDRALAZINE HYDROCHLORIDE 75 MG: 25 TABLET, FILM COATED ORAL at 09:20

## 2023-11-08 RX ADMIN — Medication 10 ML: at 21:28

## 2023-11-08 RX ADMIN — HYDROXYZINE HYDROCHLORIDE 50 MG: 25 TABLET, FILM COATED ORAL at 21:26

## 2023-11-08 RX ADMIN — ACETAMINOPHEN 650 MG: 325 TABLET, FILM COATED ORAL at 12:45

## 2023-11-08 RX ADMIN — HYDROCHLOROTHIAZIDE 50 MG: 25 TABLET ORAL at 09:20

## 2023-11-08 RX ADMIN — ASPIRIN 81 MG: 81 TABLET, COATED ORAL at 09:20

## 2023-11-08 RX ADMIN — ACETAMINOPHEN 650 MG: 325 TABLET, FILM COATED ORAL at 01:43

## 2023-11-08 RX ADMIN — DOCUSATE SODIUM 50MG AND SENNOSIDES 8.6MG 2 TABLET: 8.6; 5 TABLET, FILM COATED ORAL at 21:26

## 2023-11-08 RX ADMIN — HYDROXYZINE HYDROCHLORIDE 50 MG: 25 TABLET, FILM COATED ORAL at 09:20

## 2023-11-08 RX ADMIN — HYDRALAZINE HYDROCHLORIDE 75 MG: 25 TABLET, FILM COATED ORAL at 21:26

## 2023-11-08 NOTE — CONSULTS
Referring Provider: Quinten Siegel MD  Reason for Consultation:  Chest  Back pain    Patient Care Team:  Antoinette Bernstein APRN as PCP - General (Family Medicine)  Hien Camacho MD as Consulting Physician (Cardiology)    Chief complaint chest and back pain    Subjective .     History of present illness:  Scarlet Gong is a 68 y.o. female who presents with history of right back discomfort with radiation into the anterior chest area.  Dull aching sensation.  No other associated aggravating or alleviating factors.  The pain is nonexertional.  Not radiating to neck or into the arms.  Patient had elevated blood pressure.  Patient came to the emergency room.  EKG showed no acute changes.  Cardiology consultation was requested..  ROS      Patient is not having any shortness of breath, palpitations, dizziness or syncope.  Denies having any headache, abdominal pain, nausea, vomiting, diarrhea, constipation, loss of weight or loss of appetite.  Denies having any excessive bruising, hematuria or blood in the stool.    Review of all systems negative except as indicated      History  Past Medical History:   Diagnosis Date    Asthma 04/27/2016    Hyperlipidemia 03/02/2020    Hypertension 03/02/2020    Obesity (BMI 30-39.9) 03/02/2020    Osteopenia 03/02/2020       Past Surgical History:   Procedure Laterality Date    CARDIAC CATHETERIZATION N/A 03/04/2020    Procedure: Left Heart Cath and coronary angiogram;  Surgeon: Hien Camacho MD;  Location: [facility] INVASIVE LOCATION;  Service: Cardiovascular;  Laterality: N/A;    CARDIAC CATHETERIZATION N/A 03/04/2020    Procedure: Left ventriculography;  Surgeon: Hien Camacho MD;  Location: [facility] CATH INVASIVE LOCATION;  Service: Cardiovascular;  Laterality: N/A;    CARDIAC CATHETERIZATION N/A 1/30/2023    Procedure: Left Heart Cath and coronary angiogram;  Surgeon: Hien Camacho MD;  Location: [facility] CATH INVASIVE LOCATION;  Service: Cardiovascular;   Laterality: N/A;    CARDIAC CATHETERIZATION N/A 1/30/2023    Procedure: Right Heart Cath;  Surgeon: Hien Camacho MD;  Location: [facility] CATH INVASIVE LOCATION;  Service: Cardiovascular;  Laterality: N/A;       Family History   Problem Relation Age of Onset    Hypertension Mother     Heart disease Sister     Heart disease Brother     Colon cancer Maternal Grandmother        Social History     Tobacco Use    Smoking status: Former     Packs/day: 1.50     Years: 32.00     Additional pack years: 0.00     Total pack years: 48.00     Types: Cigarettes     Passive exposure: Past    Smokeless tobacco: Never   Vaping Use    Vaping Use: Never used   Substance Use Topics    Alcohol use: Never    Drug use: Never        Medications Prior to Admission   Medication Sig Dispense Refill Last Dose    albuterol sulfate  (90 Base) MCG/ACT inhaler Inhale 2 puffs Every 6 (Six) Hours As Needed.       aspirin 81 MG EC tablet Take 1 tablet by mouth Daily.       calcium carbonate (OS-JEANA) 600 MG tablet Take 1 tablet by mouth Daily.       Cholecalciferol 50 MCG (2000 UT) capsule Take 2,000 Int'l Units by mouth Daily.       ferrous sulfate 325 (65 FE) MG tablet Take 1 tablet by mouth Daily With Breakfast.       hydrALAZINE (APRESOLINE) 50 MG tablet Take 1.5 tablets by mouth 2 (Two) Times a Day.       hydroCHLOROthiazide (HYDRODIURIL) 50 MG tablet Take 1 tablet by mouth Daily.       hydrOXYzine (ATARAX) 50 MG tablet Take 1 tablet by mouth 2 (Two) Times a Day. 60 tablet 5     metoprolol succinate XL (TOPROL-XL) 25 MG 24 hr tablet Take 1 tablet by mouth Daily. 30 tablet 0     Potassium 99 MG tablet Take 1 tablet by mouth 2 (Two) Times a Day.       zinc gluconate 50 MG tablet Take 1 tablet by mouth Daily.            Prednisone, Atorvastatin, Lisinopril, and Azithromycin    Scheduled Meds:labetalol, 10 mg, Intravenous, Once  lidocaine, 1 patch, Transdermal, Q24H  senna-docusate sodium, 2 tablet, Oral, BID  sodium chloride, 10 mL,  "Intravenous, Q12H      Continuous Infusions:nitroglycerin, 5-200 mcg/min, Last Rate: 30 mcg/min (11/08/23 0452)      PRN Meds:.  acetaminophen    senna-docusate sodium **AND** polyethylene glycol **AND** bisacodyl **AND** bisacodyl    melatonin    ondansetron    [COMPLETED] Insert Peripheral IV **AND** sodium chloride    sodium chloride    sodium chloride    Objective     VITAL SIGNS  Vitals:    11/08/23 0515 11/08/23 0530 11/08/23 0545 11/08/23 0600   BP: 167/57 141/53 140/50 139/56   BP Location:       Patient Position:       Pulse: 73 74 72 76   Resp:       Temp:       TempSrc:       SpO2: 91% 93% 93% 92%   Weight:       Height:           Flowsheet Rows      Flowsheet Row First Filed Value   Admission Height 157.5 cm (62\") Documented at 11/07/2023 1452   Admission Weight 104 kg (229 lb 4.5 oz) Documented at 11/07/2023 1452              Intake/Output Summary (Last 24 hours) at 11/8/2023 0627  Last data filed at 11/7/2023 2258  Gross per 24 hour   Intake 360 ml   Output --   Net 360 ml        TELEMETRY: Sinus rhythm    Physical Exam:  The patient is alert, oriented and in no distress.  Vital signs as noted above.  Exogenous obesity.  Head and neck revealed no carotid bruits or jugular venous distention.  No thyromegaly or lymphadenopathy is present  Lungs clear.  No wheezing.  Breath sounds are normal bilaterally.  Heart normal first and second heart sounds. No murmur.  No precordial rub is present.  No gallop is present.  Abdomen soft and nontender.  No organomegaly is present.  Extremities with good peripheral pulses without any pedal edema.  Skin warm and dry.  Musculoskeletal system is grossly normal  CNS grossly normal    Reviewed and updated.    Results Review:   I reviewed the patient's new clinical results.  Lab Results (last 24 hours)       Procedure Component Value Units Date/Time    Basic Metabolic Panel [[id number]]  (Abnormal) Collected: 11/08/23 0320    Specimen: Blood from Arm, Right Updated: 11/08/23 " 0455     Glucose 122 mg/dL      BUN 16 mg/dL      Creatinine 0.56 mg/dL      Sodium 138 mmol/L      Potassium 4.0 mmol/L      Chloride 99 mmol/L      CO2 27.0 mmol/L      Calcium 9.7 mg/dL      BUN/Creatinine Ratio 28.6     Anion Gap 12.0 mmol/L      eGFR 100.2 mL/min/1.73     Narrative:      GFR Normal >60  Chronic Kidney Disease <60  Kidney Failure <15      CBC Auto Differential [[id number]]  (Abnormal) Collected: 11/08/23 0320    Specimen: Blood from Arm, Right Updated: 11/08/23 0440     WBC 12.70 10*3/mm3      RBC 4.15 10*6/mm3      Hemoglobin 11.6 g/dL      Hematocrit 35.8 %      MCV 86.3 fL      MCH 28.0 pg      MCHC 32.5 g/dL      RDW 14.6 %      RDW-SD 43.8 fl      MPV 7.3 fL      Platelets 316 10*3/mm3      Neutrophil % 72.7 %      Lymphocyte % 19.4 %      Monocyte % 6.1 %      Eosinophil % 0.7 %      Basophil % 1.1 %      Neutrophils, Absolute 9.20 10*3/mm3      Lymphocytes, Absolute 2.50 10*3/mm3      Monocytes, Absolute 0.80 10*3/mm3      Eosinophils, Absolute 0.10 10*3/mm3      Basophils, Absolute 0.10 10*3/mm3      nRBC 0.1 /100 WBC     Single High Sensitivity Troponin T [[id number]]  (Normal) Collected: 11/07/23 1820    Specimen: Blood Updated: 11/07/23 1843     HS Troponin T 11 ng/L     Narrative:      High Sensitive Troponin T Reference Range:  <14.0 ng/L- Negative Female for AMI  <22.0 ng/L- Negative Male for AMI  >=14 - Abnormal Female indicating possible myocardial injury.  >=22 - Abnormal Male indicating possible myocardial injury.   Clinicians would have to utilize clinical acumen, EKG, Troponin, and serial changes to determine if it is an Acute Myocardial Infarction or myocardial injury due to an underlying chronic condition.         POC Creatinine [[id number]]  (Normal) Collected: 11/07/23 1548    Specimen: Venous Blood Updated: 11/07/23 1653     Creatinine 0.70 mg/dL      Comment: Serial Number: [id number]:  [id number]        eGFR 94.9 mL/min/1.73     Comprehensive Metabolic Panel [[id number]]   (Abnormal) Collected: 11/07/23 1538    Specimen: Blood Updated: 11/07/23 1615     Glucose 124 mg/dL      BUN 17 mg/dL      Creatinine 0.75 mg/dL      Sodium 139 mmol/L      Potassium 4.1 mmol/L      Chloride 99 mmol/L      CO2 28.0 mmol/L      Calcium 10.4 mg/dL      Total Protein 7.6 g/dL      Albumin 4.3 g/dL      ALT (SGPT) 25 U/L      AST (SGOT) 23 U/L      Alkaline Phosphatase 86 U/L      Total Bilirubin 0.3 mg/dL      Globulin 3.3 gm/dL      A/G Ratio 1.3 g/dL      BUN/Creatinine Ratio 22.7     Anion Gap 12.0 mmol/L      eGFR 87.4 mL/min/1.73     Narrative:      GFR Normal >60  Chronic Kidney Disease <60  Kidney Failure <15      Lipase [[id number]]  (Normal) Collected: 11/07/23 1538    Specimen: Blood Updated: 11/07/23 1615     Lipase 26 U/L     Single High Sensitivity Troponin T [[id number]]  (Normal) Collected: 11/07/23 1538    Specimen: Blood Updated: 11/07/23 1615     HS Troponin T 11 ng/L     Narrative:      High Sensitive Troponin T Reference Range:  <14.0 ng/L- Negative Female for AMI  <22.0 ng/L- Negative Male for AMI  >=14 - Abnormal Female indicating possible myocardial injury.  >=22 - Abnormal Male indicating possible myocardial injury.   Clinicians would have to utilize clinical acumen, EKG, Troponin, and serial changes to determine if it is an Acute Myocardial Infarction or myocardial injury due to an underlying chronic condition.         Protime-INR [[id number]]  (Normal) Collected: 11/07/23 1538    Specimen: Blood Updated: 11/07/23 1613     Protime 10.9 Seconds      INR 1.00    aPTT [[id number]]  (Abnormal) Collected: 11/07/23 1538    Specimen: Blood Updated: 11/07/23 1613     PTT 30.2 seconds     CBC & Differential [[id number]]  (Abnormal) Collected: 11/07/23 1538    Specimen: Blood Updated: 11/07/23 1545    Narrative:      The following orders were created for panel order CBC & Differential.  Procedure                               Abnormality         Status                     ---------                                -----------         ------                     CBC Auto Differential[[id number]]        Abnormal            Final result                 Please view results for these tests on the individual orders.    CBC Auto Differential [[id number]]  (Abnormal) Collected: 11/07/23 1538    Specimen: Blood Updated: 11/07/23 1545     WBC 11.20 10*3/mm3      RBC 4.52 10*6/mm3      Hemoglobin 12.7 g/dL      Hematocrit 39.2 %      MCV 86.8 fL      MCH 28.0 pg      MCHC 32.3 g/dL      RDW 14.9 %      RDW-SD 44.6 fl      MPV 6.7 fL      Platelets 351 10*3/mm3      Neutrophil % 63.8 %      Lymphocyte % 26.9 %      Monocyte % 6.7 %      Eosinophil % 2.1 %      Basophil % 0.5 %      Neutrophils, Absolute 7.10 10*3/mm3      Lymphocytes, Absolute 3.00 10*3/mm3      Monocytes, Absolute 0.70 10*3/mm3      Eosinophils, Absolute 0.20 10*3/mm3      Basophils, Absolute 0.10 10*3/mm3      nRBC 0.0 /100 WBC             Imaging Results (Last 24 Hours)       Procedure Component Value Units Date/Time    CT Angiogram Chest [[id number]] Collected: 11/07/23 1656     Updated: 11/07/23 1709    Narrative:      CT ANGIOGRAM CHEST    Date of Exam: 11/7/2023 4:29 PM EST    Indication: chest pain radiating to her back, hypertensive (220/80).    Comparison: None available.    Technique: CTA of the chest was performed after the uneventful intravenous administration of iodinated contrast. Reconstructed coronal and sagittal images were also obtained. In addition, a 3-D volume rendered image was created for interpretation.   Automated exposure control and iterative reconstruction methods were used.    FINDINGS:    Thoracic inlet: Unremarkable.    Pulmonary arteries: No filling defects are identified within the pulmonary arteries to suggest acute pulmonary embolism.    Great vessels: The thoracic aorta and proximal arch vessels appear unremarkable.    Mediastinum/Nidia: No pathologically enlarged mediastinal lymph nodes are seen. The esophagus appears  unremarkable.    Lung parenchyma: The lungs appear adequately aerated. No acute consolidation is seen. No suspicious pulmonary nodules are seen.    Trachea and airways: Fine weblike defect within the right mainstem bronchus, likely related to respiratory secretions. The central airways are otherwise unremarkable.    Pleural space: No significant pleural effusion or pneumothorax is seen.    Heart and pericardium: The heart and pericardium appear unremarkable.    Chest wall: No acute or suspicious osseous or soft tissue lesion is identified.    Upper abdomen: No acute abnormality is identified within the visualized upper abdomen.      Impression:      1.No acute abnormality is identified within the thorax. Specifically, there is no evidence of acute pulmonary embolism.  2.No acute abnormality of the thoracic aorta.  3.Additional findings as detailed above.      Electronically Signed: Po Ashraf MD    11/7/2023 5:07 PM EST    Workstation ID: [id number]        LAB RESULTS (LAST 7 DAYS)    CBC  Results from last 7 days   Lab Units 11/08/23  0320 11/07/23  1538   WBC 10*3/mm3 12.70* 11.20*   RBC 10*6/mm3 4.15 4.52   HEMOGLOBIN g/dL 11.6* 12.7   HEMATOCRIT % 35.8 39.2   MCV fL 86.3 86.8   PLATELETS 10*3/mm3 316 351       BMP  Results from last 7 days   Lab Units 11/08/23  0320 11/07/23  1548 11/07/23  1538   SODIUM mmol/L 138  --  139   POTASSIUM mmol/L 4.0  --  4.1   CHLORIDE mmol/L 99  --  99   CO2 mmol/L 27.0  --  28.0   BUN mg/dL 16  --  17   CREATININE mg/dL 0.56* 0.70 0.75   GLUCOSE mg/dL 122*  --  124*       CMP   Results from last 7 days   Lab Units 11/08/23  0320 11/07/23  1548 11/07/23  1538   SODIUM mmol/L 138  --  139   POTASSIUM mmol/L 4.0  --  4.1   CHLORIDE mmol/L 99  --  99   CO2 mmol/L 27.0  --  28.0   BUN mg/dL 16  --  17   CREATININE mg/dL 0.56* 0.70 0.75   GLUCOSE mg/dL 122*  --  124*   ALBUMIN g/dL  --   --  4.3   BILIRUBIN mg/dL  --   --  0.3   ALK PHOS U/L  --   --  86   AST (SGOT) U/L  --   --   23   ALT (SGPT) U/L  --   --  25   LIPASE U/L  --   --  26         BNP        TROPONIN  Results from last 7 days   Lab Units 11/07/23  1820   HSTROP T ng/L 11       CoAg  Results from last 7 days   Lab Units 11/07/23  1538   INR  1.00   APTT seconds 30.2*       Creatinine Clearance  Estimated Creatinine Clearance: 114 mL/min (A) (by C-G formula based on SCr of 0.56 mg/dL (L)).    ABG        Radiology  CT Angiogram Chest    Result Date: 11/7/2023  1.No acute abnormality is identified within the thorax. Specifically, there is no evidence of acute pulmonary embolism. 2.No acute abnormality of the thoracic aorta. 3.Additional findings as detailed above. Electronically Signed: Po Ashraf MD  11/7/2023 5:07 PM EST  Workstation ID: [id number]       EKG            I personally viewed and interpreted the patient's EKG/Telemetry data: Sinus rhythm    ECHOCARDIOGRAM:    Results for orders placed during the hospital encounter of 01/28/23    Adult Transthoracic Echo Complete W/ Cont if Necessary Per Protocol    Interpretation Summary    Left ventricular systolic function is normal. Left ventricular ejection fraction appears to be 61 - 65%.    Left ventricular wall thickness is consistent with mild concentric hypertrophy.    Left ventricular diastolic function was normal.    Estimated right ventricular systolic pressure from tricuspid regurgitation is normal (<35 mmHg). Calculated right ventricular systolic pressure from tricuspid regurgitation is 29 mmHg.      STRESS TEST  Results for orders placed during the hospital encounter of 01/28/23    Stress Test With Myocardial Perfusion One Day    Interpretation Summary    Left ventricular ejection fraction is normal (Calculated EF = 65%).    Myocardial perfusion imaging indicates a small-to-medium-sized, mild-to-moderately severe area of ischemia located in the basal inferior lateral wall.    Diaphragmatic attenuation and GI artifacts are present.    There is no prior study  available for comparison.    Findings consistent with a normal ECG stress test.    Abnormal study  Normal EF 65%  Stress did not reach target heart rate, no changes at submaximal stress  No arrhythmias seen  Normal blood pressure response  Stress and rest nuclear images compared with reversibility of the basal to mid inferolateral wall versus diaphragmatic and GI attenuation    Abnormal study  Cannot rule out inferolateral ischemia mild to moderate size and severity  Correlate with clinical symptomatology        Cardiolite (Tc-99m sestamibi) stress test    HEART CATHETERIZATION  Results for orders placed during the hospital encounter of 01/28/23    Cardiac Catheterization/Vascular Study    Narrative  CARDIAC CATHETERIZATION REPORT    DATE OF PROCEDURE:  1/30/2023    INDICATION FOR PROCEDURE:  Shortness of breath  Chest discomfort  Abnormal stress nuclear test    PROCEDURE PERFORMED:  Right heart catheterization  Left heart catheterization, coronary angiography, left ventriculography  @@  Moderate conscious sedation was utilized with intravenous Versed and fentanyl administered by registered nurse with continuous ECG, pulse oximetry and hemodynamic monitoring supervised by myself throughout the entire procedure.  Conscious sedation time   30 minutes    I was present with the patient for the duration of moderate sedation and supervised staff who had no other duties and monitored the patient for the entire procedure.    @@  PROCEDURE COMMENTS:    Under usual sterile precautions and 1% Xylocaine filtration right femoral vein and femoral artery were percutaneously punctured and a 7 and 5 [place] vascular sheaths were respectively inserted.  Elmer-Debi catheter was used to measure right-sided pressures pulmonary capillary wedge pressure and cardiac output using thermodilution technique.  Elmer-Debi catheter was removed and subsequently left and right coronary arteriography was performed in varying degrees of obliquity  followed by left ventricular angiogram.  Hemostasis was obtained and patient was returned to the room with intact pulses.  No complications were noted.    FINDINGS:    1. HEMODYNAMICS:  Left ventricle end-diastolic pressure is normal.  No gradient was noted across aortic valve.  Mean right atrial pressure 9 pulmonary artery 35/12 mean pulmonary artery 27 pulmonary capital wedge pressure is 20.    2. LEFT VENTRICULOGRAPHY:  Left ventricular size and contractility is normal with ejection fraction of 60%.  No mitral regurgitation is present.    3. CORONARY ANGIOGRAPHY:  Left main coronary artery is normal.  Left anterior descending artery is normal.  Diagonal branch has proximal 50% disease.  Circumflex coronary artery is normal.  Right coronary artery is a large and dominant vessel and is normal.    SUMMARY:  No evidence for pulmonary hypertension is present.  Normal left ventricular size and contractility with ejection fraction of 60%.  Normal epicardial coronary arteries except for 50% proximal diagonal branch disease.    RECOMMENDATIONS:  Noncardiac work-up.      OTHER:     Assessment & Plan     Principal Problem:    Chest pain    ]]]]]]]]]]]]]]]  History  ======  Chest and back discomfort-somewhat atypical.  EKG showed no acute changes.  Troponin levels were negative.    -Chest discomfort-improved.     History of positive stress Cardiolite test prior to cardiac catheterization    Stress Cardiolite test-1/28/202.  Abnormal study  Cannot rule out inferolateral ischemia mild to moderate size and severity  Correlate with clinical symptomatology    Echocardiogram 1/29/2023   Left ventricular systolic function is normal. Left ventricular ejection fraction appears to be 61 - 65%.    Left ventricular wall thickness is consistent with mild concentric hypertrophy.    Left ventricular diastolic function was normal.    Estimated right ventricular systolic pressure from tricuspid regurgitation is normal (<35 mmHg). Calculated  right ventricular systolic pressure from tricuspid regurgitation is 29 mmHg.     Cardiac catheterization 1/30/2023  No evidence for pulmonary hypertension.  Normal left ventricular size and contractility with ejection fraction of 60%.  Normal epicardial coronary arteries except for 50% proximal diagonal branch disease.     Cardiac catheterization 3/4/2020 revealed  Normal left ventricular size and contractility with ejection fraction of 60%.  Normal epicardial coronary arteries.     Mild elevation of [id number] CT scan of the chest.     Hypertension dyslipidemia asthma.  Obstructive sleep apnea     Significant hypomagnesemia     Exogenous obesity.     Previous COVID-19 infection     No significant surgical problems     Family history of coronary artery disease     Former smoker     Allergic to Z-Bruce     ============  Plan  ========  Chest discomfort and back discomfort.-Atypical.  Troponin levels are negative.  EKG showed no acute changes.  Stress Cardiolite test  Echocardiogram    Hypertension-139/56.    History of hypomagnesemia-improved.     Medications were reviewed and updated.  Current medications include aspirin ferrous sulfate hydralazine hydrochlorothiazide hydroxyzine    Further plan will depend on patient's progress.     Reviewed and updated-11/8/2023.  ]]]]]]]]]]]]       Hien Camacho MD  11/08/23  06:27 EST

## 2023-11-08 NOTE — H&P
[facility] H&P    Patient Name: Scarlet Gong  : 1955  MRN: [id number]  Primary Care Physician: Antoinette Bernstein APRN  Date of admission: 2023     Patient Care Team:  Antoinette Bernstein APRN as PCP - General (Family Medicine)  Hien Camacho MD as Consulting Physician (Cardiology)          Subjective   History Present Illness     Chief Complaint:   Chief Complaint   Patient presents with    Back Pain     Chest pain    History of Present Illness  Obtained from ED provider HPI on 2023:  68-year-old female presents with back pain radiating into her chest with elevated blood pressure.  She states she has been compliantly taking all her medication and it typically runs 120s.  She denies any recent viral illness.  She took some Tylenol without relief.  Denies any bowel or bladder incontinence retention saddle anesthesia or fever.  No recent trauma or falls     23:  Patient confirms the HPI noted above including a sudden onset of chest pain described as a heaviness which began at approximately 1300 hrs. on the day of presentation but without any significant exertion or stress.  Pain was located in the upper portion of her chest radiating across the anterior chest towards the right side of her back with some associated dyspnea.  She has had a somewhat chronic nonproductive cough but denies any nausea, vomiting, fever, palpitations, peripheral edema, lightheadedness or near syncope.  Patient does report that her dyspnea and exertional fatigue have been present for quite some time and she has gone through exhaustive work-ups for both her heart and lungs without obvious cause identified at this point.  Patient does not smoke or drink alcohol and confirms compliance with all of her outpatient medical therapies.        ROS  Review of Systems   Constitutional: Positive for malaise/fatigue.   HENT: Negative.     Eyes: Negative.    Cardiovascular:  Positive for chest pain.    Respiratory:  Positive for cough and shortness of breath. Negative for sputum production.    Skin: Negative.    Musculoskeletal: Negative.    Gastrointestinal: Negative.    Genitourinary: Negative.    Neurological: Negative.    Psychiatric/Behavioral: Negative.           Personal History     Past Medical History:   Past Medical History:   Diagnosis Date    Asthma 04/27/2016    Hyperlipidemia 03/02/2020    Hypertension 03/02/2020    Obesity (BMI 30-39.9) 03/02/2020    Osteopenia 03/02/2020       Surgical History:      Past Surgical History:   Procedure Laterality Date    CARDIAC CATHETERIZATION N/A 03/04/2020    Procedure: Left Heart Cath and coronary angiogram;  Surgeon: Hien Camacho MD;  Location: [facility] CATH INVASIVE LOCATION;  Service: Cardiovascular;  Laterality: N/A;    CARDIAC CATHETERIZATION N/A 03/04/2020    Procedure: Left ventriculography;  Surgeon: Hien Camacho MD;  Location: [facility] CATH INVASIVE LOCATION;  Service: Cardiovascular;  Laterality: N/A;    CARDIAC CATHETERIZATION N/A 1/30/2023    Procedure: Left Heart Cath and coronary angiogram;  Surgeon: Hien Camacho MD;  Location: [facility] CATH INVASIVE LOCATION;  Service: Cardiovascular;  Laterality: N/A;    CARDIAC CATHETERIZATION N/A 1/30/2023    Procedure: Right Heart Cath;  Surgeon: Hien Camacho MD;  Location: [facility] CATH INVASIVE LOCATION;  Service: Cardiovascular;  Laterality: N/A;           Family History: family history includes Colon cancer in her maternal grandmother; Heart disease in her brother and sister; Hypertension in her mother. Otherwise pertinent FHx was reviewed and unremarkable.     Social History:  reports that she has quit smoking. Her smoking use included cigarettes. She has a 48.00 pack-year smoking history. She has been exposed to tobacco smoke. She has never used smokeless tobacco. She reports that she does not drink alcohol and does not use drugs.      Medications:  Prior to Admission medications   "  Medication Sig Start Date End Date Taking? Authorizing Provider   albuterol sulfate  (90 Base) MCG/ACT inhaler Inhale 2 puffs Every 6 (Six) Hours As Needed. 6/23/22  Yes Lorraine Goyal MD   aspirin 81 MG EC tablet Take 1 tablet by mouth Daily.   Yes Lorraine Goyal MD   calcium carbonate (OS-JEANA) 600 MG tablet Take 1 tablet by mouth Daily.   Yes Lorraine Goyal MD   Cholecalciferol 50 MCG (2000 UT) capsule Take 2,000 Int'l Units by mouth Daily. 12/5/20  Yes Lorraine Goyal MD   ferrous sulfate 325 (65 FE) MG tablet Take 1 tablet by mouth Daily With Breakfast.   Yes Lorraine Goyal MD   hydrALAZINE (APRESOLINE) 50 MG tablet Take 1.5 tablets by mouth 2 (Two) Times a Day. 7/6/22  Yes Lorraine Goyal MD   hydroCHLOROthiazide (HYDRODIURIL) 50 MG tablet Take 1 tablet by mouth Daily. 7/6/22  Yes Lorraine Goyal MD   hydrOXYzine (ATARAX) 50 MG tablet Take 1 tablet by mouth 2 (Two) Times a Day. 7/24/23  Yes Antoinette Bernstein APRN   metoprolol succinate XL (TOPROL-XL) 25 MG 24 hr tablet Take 1 tablet by mouth Daily. 1/30/23  Yes Christi Ramires APRN   Potassium 99 MG tablet Take 1 tablet by mouth 2 (Two) Times a Day.   Yes Lorraine Goyal MD   zinc gluconate 50 MG tablet Take 1 tablet by mouth Daily. 12/5/20  Yes Lorraine Goyal MD       Allergies:    Allergies   Allergen Reactions    Prednisone Rash    Atorvastatin Itching    Lisinopril Cough    Azithromycin Rash     Re-entered from allergy \"Z-Pack\"       Objective   Objective     Vital Signs  Temp:  [97.6 °F (36.4 °C)-98.9 °F (37.2 °C)] 98.9 °F (37.2 °C)  Heart Rate:  [72-96] 84  Resp:  [18-20] 18  BP: ()/() 147/60  SpO2:  [90 %-98 %] 91 %  on   ;   Device (Oxygen Therapy): room air  Body mass index is 44.31 kg/m².    Physical Exam  Physical Exam  Vitals reviewed.   Constitutional:       General: She is not in acute distress.     Appearance: Normal appearance. She is not ill-appearing, " toxic-appearing or diaphoretic.   HENT:      Head: Normocephalic.      Right Ear: External ear normal.      Left Ear: External ear normal.      Nose: Nose normal.      Mouth/Throat:      Mouth: Mucous membranes are moist.   Eyes:      Extraocular Movements: Extraocular movements intact.   Cardiovascular:      Rate and Rhythm: Normal rate and regular rhythm.      Pulses: Normal pulses.   Pulmonary:      Effort: Pulmonary effort is normal.      Breath sounds: Normal breath sounds.   Abdominal:      General: Bowel sounds are normal.      Palpations: Abdomen is soft.   Musculoskeletal:      Cervical back: Normal range of motion.      Right lower leg: No edema.      Left lower leg: No edema.   Skin:     General: Skin is warm and dry.      Capillary Refill: Capillary refill takes less than 2 seconds.   Neurological:      General: No focal deficit present.      Mental Status: She is alert.   Psychiatric:         Mood and Affect: Mood normal.         Behavior: Behavior normal.         Thought Content: Thought content normal.         Judgment: Judgment normal.     Results Review:  I have personally reviewed most recent cardiac tracings, lab results, and radiology images and interpretations and agree with findings, most notably: Troponin, lipase, CBC, CMP, UA, CT of chest and EKG.    Results from last 7 days   Lab Units 11/08/23  0320 11/07/23  1538   WBC 10*3/mm3 12.70* 11.20*   HEMOGLOBIN g/dL 11.6* 12.7   HEMATOCRIT % 35.8 39.2   PLATELETS 10*3/mm3 316 351   INR   --  1.00     Results from last 7 days   Lab Units 11/08/23  0320 11/07/23  1820 11/07/23  1548 11/07/23  1538   SODIUM mmol/L 138  --   --  139   POTASSIUM mmol/L 4.0  --   --  4.1   CHLORIDE mmol/L 99  --   --  99   CO2 mmol/L 27.0  --   --  28.0   BUN mg/dL 16  --   --  17   CREATININE mg/dL 0.56*  --    < > 0.75   GLUCOSE mg/dL 122*  --   --  124*   CALCIUM mg/dL 9.7  --   --  10.4   ALK PHOS U/L  --   --   --  86   ALT (SGPT) U/L  --   --   --  25   AST  (SGOT) U/L  --   --   --  23   HSTROP T ng/L 8 11 --  11    < > = values in this interval not displayed.     Estimated Creatinine Clearance: 114 mL/min (A) (by C-G formula based on SCr of 0.56 mg/dL (L)).  Brief Urine Lab Results  (Last result in the past 365 days)        Color   Clarity   Blood   Leuk Est   Nitrite   Protein   CREAT   Urine HCG        11/08/23 0803 Yellow   Clear   Negative   Negative   Negative   Negative                   Microbiology Results (last 10 days)       ** No results found for the last 240 hours. **            ECG/EMG Results (most recent)       Procedure Component Value Units Date/Time    SCANNED - TELEMETRY   [[id number]] Resulted: 11/07/23     Updated: 11/08/23 0650    ECG 12 Lead Chest Pain [[id number]] Collected: 11/07/23 1531     Updated: 11/08/23 0655     QT Interval 354 ms      QTC Interval 414 ms     Narrative:      HEART RATE= 82  bpm  RR Interval= 732  ms  LA Interval= 181  ms  P Horizontal Axis= 3  deg  P Front Axis= 56  deg  QRSD Interval= 87  ms  QT Interval= 354  ms  QTcB= 414  ms  QRS Axis= 42  deg  T Wave Axis= 48  deg  - NORMAL ECG -  Sinus rhythm  When compared with ECG of 28-Jan-2023 15:38:17,  Significant rate increase  Electronically Signed By: Brijesh Quan (ERASMO) 08-Nov-2023 06:55:16  Date and Time of Study: 2023-11-07 15:31:14            Results for orders placed during the hospital encounter of 06/22/23    Doppler Ankle Brachial Index Single Level CAR    Interpretation Summary    Right Conclusion: The right JALEN is normal. Normal digital pressures.    Left Conclusion: The left JALEN is normal. Normal digital pressures.      Results for orders placed during the hospital encounter of 01/28/23    Adult Transthoracic Echo Complete W/ Cont if Necessary Per Protocol    Interpretation Summary    Left ventricular systolic function is normal. Left ventricular ejection fraction appears to be 61 - 65%.    Left ventricular wall thickness is consistent with mild concentric  hypertrophy.    Left ventricular diastolic function was normal.    Estimated right ventricular systolic pressure from tricuspid regurgitation is normal (<35 mmHg). Calculated right ventricular systolic pressure from tricuspid regurgitation is 29 mmHg.      CT Angiogram Chest    Result Date: 11/7/2023  1.No acute abnormality is identified within the thorax. Specifically, there is no evidence of acute pulmonary embolism. 2.No acute abnormality of the thoracic aorta. 3.Additional findings as detailed above. Electronically Signed: Po Ashraf MD  11/7/2023 5:07 PM EST  Workstation ID: [id number]       Estimated Creatinine Clearance: 114 mL/min (A) (by C-G formula based on SCr of 0.56 mg/dL (L)).    Assessment & Plan   Assessment/Plan       Active Hospital Problems    Diagnosis  POA    **Chest pain [R07.9]  Yes      Resolved Hospital Problems   No resolved problems to display.       Chest pain        Lab Results   Component Value Date     TROPONINT 8 11/08/2023     TROPONINT 11 11/07/2023     TROPONINT 11 11/07/2023   -Lipase: 26  -WBCs: 11.20 trended to 12.70 with an increased absolute neutrophil count noted on differential  -UA showed an elevated specific gravity 1.035 but was otherwise unremarkable  -CTA of chest showed no acute abnormality specifically no evidence of acute pulmonary embolism with no acute abnormality of the thoracic aorta showed fine weblike defect in the right mainstem bronchus noted is likely related to respiratory secretions  -EKG: Sinus rhythm at 82 without obvious acute changes and a QTc of 414 ms  -In the ED pt given home blood pressure medications as well as IV morphine and Tridil infusion  -Cardiology consulted  -Stress Test ordered and resting images have been obtained however stress images will be delayed until 11/9/2023  -Telemetry  -NPO  -Continue aspirin and beta-blocker once cardiology recommendations received    Hypertension  -Improving control noted but patient remains on Tridil  infusion      BP Readings from Last 1 Encounters:   11/08/23 136/53   - Continue hydralazine, hydrochlorothiazide and resume metoprolol after evaluation by cardiologist  - Monitor while admitted     COPD  -Albuterol     Morbid obesity (BMI: 44.31)  -Encouraged lifestyle modifications        VTE Prophylaxis -   Mechanical Order History:        Ordered        11/07/23 1852  Place Sequential Compression Device  Once            11/07/23 1852  Maintain Sequential Compression Device  Continuous                          Pharmalogical Order History:       None            CODE STATUS:    Code Status and Medical Interventions:   Ordered at: 11/07/23 1849     Code Status (Patient has no pulse and is not breathing):    CPR (Attempt to Resuscitate)     Medical Interventions (Patient has pulse or is breathing):    Full Support       This patient has been examined wearing personal protective equipment.     I discussed the patient's findings and my recommendations with patient and nursing staff.      Signature:Electronically signed by Mike Greenwood PA-C, 11/08/23, 3:33 PM EST.

## 2023-11-08 NOTE — CASE MANAGEMENT/SOCIAL WORK
Discharge Planning Assessment   [facility]     Patient Name: Scarlet Gong  MRN: [id number]  Today's Date: 11/8/2023    Admit Date: 11/7/2023    Plan: Return home. Referral to [facility]   Discharge Needs Assessment       Row Name 11/08/23 1411       Living Environment    People in Home alone    Current Living Arrangements home    Potentially Unsafe Housing Conditions none    Primary Care Provided by self    Provides Primary Care For no one    Family Caregiver if Needed child(olu), adult;sibling(s)    Family Caregiver Names son Francisco J, sister Suzanne    Quality of Family Relationships helpful;involved;supportive    Able to Return to Prior Arrangements yes       Resource/Environmental Concerns    Resource/Environmental Concerns none    Transportation Concerns none       Transition Planning    Patient/Family Anticipates Transition to home    Patient/Family Anticipated Services at Transition outpatient care    Transportation Anticipated family or friend will provide       Discharge Needs Assessment    Readmission Within the Last 30 Days no previous admission in last 30 days    Equipment Currently Used at Home none    Concerns to be Addressed discharge planning    Outpatient/Agency/Support Group Needs outpatient therapy    Discharge Facility/Level of Care Needs outpatient therapy    Provided Post Acute Provider List? Yes    Post Acute Provider List Outpatient Therapy    Delivered To Patient    Method of Delivery In person                   Discharge Plan       Row Name 11/08/23 1413       Plan    Plan Return home. Referral to [facility]    Plan Comments CM met with patient at the bedside. Confirmed PCP, insurance, and pharmacy. Patient denies any difficulty affording medications. Patient is not current with any HHC/OPPT/OT services. Patient lives at home alone, is IADLS, and drives self. Patient's sister will drive her home at discharge. PT saw patient and is recommending OPPT. CM took  patient OPPT list and was given the choice of [facility] in [place]. Referral placed in Epic, DCP report faced to [phone], and called [facility] [place] office to inform  about referral. CM scheduled appointment for patient (details added to AVS). DC Barriers: cardiology following, nitro gtt, myoview pending                  Continued Care and Services - Admitted Since 11/7/2023       Therapy       Service Provider Request Status Selected Services Address Phone Fax Patient Preferred    [facility] [place] Pending - Request Sent N/A [address] [place] [address] [phone] --                   Demographic Summary       Row Name 11/08/23 1411       General Information    Admission Type observation    Arrived From emergency department    Referral Source admission list    Reason for Consult care coordination/care conference;discharge planning    Preferred Language English       Contact Information    Permission Granted to Share Info With     Contact Information Obtained for                    Functional Status       Row Name 11/08/23 1411       Functional Status    Usual Activity Tolerance good    Current Activity Tolerance moderate       Functional Status, IADL    Medications independent    Meal Preparation independent    Housekeeping independent    Laundry independent    Shopping independent                Gucci Rod RN     Cell number [phone]  Office number [phone]

## 2023-11-08 NOTE — DISCHARGE PLACEMENT REQUEST
"Scarlet Gregory (68 y.o. Female)       Date of Birth   1955    Social Security Number       Address   [address] [place] [address] [place] [facility]    Home Phone   [phone]    MRN   [id number]       Taoist   None    Marital Status                               Admission Date   11/7/23    Admission Type   Emergency    Admitting Provider   Quinten Siegel MD    Attending Provider   Quinten Siegel MD    Department, Room/Bed   [facility] OBSERVATION, 110/1       Discharge Date       Discharge Disposition       Discharge Destination                                 Attending Provider: Quinten Siegel MD    Allergies: Prednisone, Atorvastatin, Lisinopril, Azithromycin    Isolation: None   Infection: None   Code Status: CPR    Ht: 157.5 cm (62\")   Wt: 110 kg (242 lb 4.6 oz)    Admission Cmt: None   Principal Problem: Chest pain [R07.9]                   Active Insurance as of 11/7/2023       Primary Coverage       Payor Plan Insurance Group Employer/Plan Group    HUMANA MEDICARE REPLACEMENT HUMANA MEDICARE REPLACEMENT [id number]       Payor Plan Address Payor Plan Phone Number Payor Plan Fax Number Effective Dates    [address] [address] [phone]  7/1/2022 - None Entered    [facility] [address]         Subscriber Name Subscriber Birth Date Member ID       SCARLET GREGORY 1955 [id number]               Secondary Coverage       Payor Plan Insurance Group Employer/Plan Group    [id number]        Payor Plan Address Payor Plan Phone Number Payor Plan Fax Number Effective Dates    [address]   10/18/2022 - None Entered    [place] [address]         Subscriber Name Subscriber Birth Date Member ID       SCARLET GREGORY 1955 [id number]                     Emergency Contacts        (Rel.) Home Phone Work Phone Mobile Phone    pedro pablo lowe (Son) -- -- [phone]    RICK ESTRELLA (Sister) [phone] -- [phone]                "

## 2023-11-08 NOTE — PLAN OF CARE
Goal Outcome Evaluation:  Plan of Care Reviewed With: patient        Progress: improving  Outcome Evaluation: Scarlet Gong is a 67 y/o F who presents to [facility] on 11/7/23 for back pain radiating to her chest and elevated BP. PMH includes HTN, HLD, osteopenia, asthma, sleep apnea. Pt reports recent and similar episodes of back pain, but it always diminished with time. Pt is indep with ADLs, mobility, driving, and work, and denies hx of falls. Pt works sedentary job and reports limited amount of exercise/activity d/t back and chest pain. Pt reports every time she takes the stairs, it exacerbates both her R-sided back and midsternal chest pain. Currently, pt demo safety with bed mobility, STS, and short ambulation, as IV pole is attached to bed. Patient's back pain improves with flexion and sustained stretches, is worse with repetition. Pt would benefit from OPPT upon d/c to address back pain and improve activity tolerance.      Anticipated Discharge Disposition (PT): home with outpatient therapy services

## 2023-11-08 NOTE — DISCHARGE INSTR - APPOINTMENTS
[facility]  [address] [place] [facility] [place] [address]    11/14/2023 2:00pm. Please arrive at 1:30 for paperwork

## 2023-11-08 NOTE — PLAN OF CARE
Problem: Adult Inpatient Plan of Care  Goal: Plan of Care Review  Outcome: Ongoing, Progressing  Goal: Patient-Specific Goal (Individualized)  Outcome: Ongoing, Progressing  Goal: Absence of Hospital-Acquired Illness or Injury  Outcome: Ongoing, Progressing  Goal: Optimal Comfort and Wellbeing  Outcome: Ongoing, Progressing  Goal: Readiness for Transition of Care  Outcome: Ongoing, Progressing     Problem: Pain Acute  Goal: Acceptable Pain Control and Functional Ability  Outcome: Ongoing, Progressing     Problem: Chest Pain  Goal: Resolution of Chest Pain Symptoms  Outcome: Ongoing, Progressing     Problem: Breathing Pattern Ineffective  Goal: Effective Breathing Pattern  Outcome: Ongoing, Progressing   Goal Outcome Evaluation:         Patient admitted to observation unit at this time, resting quietly in bed with Nitro drip infusing, patient complained of chest pain 5/10 with B/ 165/104 Nitro drip increased by 5 mcg/min at this time, increase effective, b/p 123/87 HR 80 chest pain 2/10, resting quietly in bed, stated she feels like she is able to rest now. Call light in reach, will continue to monitor.

## 2023-11-08 NOTE — THERAPY EVALUATION
Patient Name: Scarlet Gong  : 1955    MRN: [id number]                              Today's Date: 2023       Admit Date: 2023    Visit Dx:     ICD-10-CM ICD-9-CM   1. Chest pain, unspecified type  R07.9 786.50   2. Elevated blood pressure reading  R03.0 796.2   3. Acute back pain, unspecified back location, unspecified back pain laterality  M54.9 724.5     Patient Active Problem List   Diagnosis    Chest pain    Asthma    Hyperlipidemia    Hypertension    Osteopenia    Obesity (BMI 30-39.9)    Abnormal nuclear stress test    Mixed hyperlipidemia    Essential hypertension    Other sleep apnea    Chest discomfort     Past Medical History:   Diagnosis Date    Asthma 2016    Hyperlipidemia 2020    Hypertension 2020    Obesity (BMI 30-39.9) 2020    Osteopenia 2020     Past Surgical History:   Procedure Laterality Date    CARDIAC CATHETERIZATION N/A 2020    Procedure: Left Heart Cath and coronary angiogram;  Surgeon: Hien Camacho MD;  Location: [facility] CATH INVASIVE LOCATION;  Service: Cardiovascular;  Laterality: N/A;    CARDIAC CATHETERIZATION N/A 2020    Procedure: Left ventriculography;  Surgeon: Hien Camacho MD;  Location: [facility] CATH INVASIVE LOCATION;  Service: Cardiovascular;  Laterality: N/A;    CARDIAC CATHETERIZATION N/A 2023    Procedure: Left Heart Cath and coronary angiogram;  Surgeon: Hien Camacho MD;  Location: [facility] CATH INVASIVE LOCATION;  Service: Cardiovascular;  Laterality: N/A;    CARDIAC CATHETERIZATION N/A 2023    Procedure: Right Heart Cath;  Surgeon: Hien Camacho MD;  Location: [facility] CATH INVASIVE LOCATION;  Service: Cardiovascular;  Laterality: N/A;      General Information       Row Name 23 1342          Physical Therapy Time and Intention    Document Type evaluation  -OD     Mode of Treatment physical therapy  -OD       Row Name 23 1342          General Information    Patient Profile  Reviewed yes  -OD     Prior Level of Function independent:;ADL's;driving;work;all household mobility;community mobility  -OD     Existing Precautions/Restrictions no known precautions/restrictions  -OD     Barriers to Rehab none identified  -OD       Row Name 11/08/23 1342          Living Environment    People in Home alone  -OD       Row Name 11/08/23 1342          Home Main Entrance    Number of Stairs, Main Entrance none  -OD       Row Name 11/08/23 1342          Stairs Within Home, Primary    Number of Stairs, Within Home, Primary none  -OD       Row Name 11/08/23 1342          Cognition    Orientation Status (Cognition) oriented x 4  -OD       Row Name 11/08/23 1342          Safety Issues, Functional Mobility    Impairments Affecting Function (Mobility) pain;strength;endurance/activity tolerance  -OD               User Key  (r) = Recorded By, (t) = Taken By, (c) = Cosigned By      Initials Name Provider Type    Maria Guadalupe Bowen PT Physical Therapist                   Mobility       Row Name 11/08/23 1342          Bed Mobility    Bed Mobility bed mobility (all) activities  -OD     All Activities, [place] (Bed Mobility) independent  -OD       Row Name 11/08/23 1342          Bed-Chair Transfer    Bed-Chair [place] (Transfers) not tested  -OD       Row Name 11/08/23 1342          Sit-Stand Transfer    Sit-Stand [place] (Transfers) supervision  -OD       Row Name 11/08/23 1342          Gait/Stairs (Locomotion)    [place] Level (Gait) supervision;other (see comments)  Fwd/bkwd/side steps, as patient's IV pole is connected to the bed.  -OD               User Key  (r) = Recorded By, (t) = Taken By, (c) = Cosigned By      Initials Name Provider Type    Maria Guadalupe Bowen PT Physical Therapist                   Obj/Interventions       Row Name 11/08/23 1343          Range of Motion Comprehensive    General Range of Motion bilateral lower extremity ROM WFL  -OD       Row Name 11/08/23 1343           Strength Comprehensive (MMT)    General Manual Muscle Testing (MMT) Assessment lower extremity strength deficits identified  -OD     Comment, General Manual Muscle Testing (MMT) Assessment Pt demo mild weakness BLE  -OD       Row Name 11/08/23 1343          Balance    Balance Interventions sitting;standing;minimal challenge  -OD       Row Name 11/08/23 1346          Sensory Assessment (Somatosensory)    Sensory Assessment (Somatosensory) sensation intact  -OD               User Key  (r) = Recorded By, (t) = Taken By, (c) = Cosigned By      Initials Name Provider Type    OD Maria Guadalupe English PT Physical Therapist                   Goals/Plan    No documentation.                  Clinical Impression       Row Name 11/08/23 1345          Pain    Additional Documentation Pain Scale: FACES Pre/Post-Treatment (Group)  -OD       Row Name 11/08/23 4084          Pain Scale: FACES Pre/Post-Treatment    Pain: FACES Scale, Pretreatment 4-->hurts little more  -OD     Posttreatment Pain Rating 4-->hurts little more  -OD     Pain Location - Side/Orientation Right  -OD     Pain Location generalized  -OD     Pain Location - back;chest  -OD       Row Name 11/08/23 2861          Plan of Care Review    Plan of Care Reviewed With patient  -OD     Progress improving  -OD     Outcome Evaluation Scarlet Gong is a 69 y/o F who presents to [facility] on 11/7/23 for back pain radiating to her chest and elevated BP. PMH includes HTN, HLD, osteopenia, asthma, sleep apnea. Pt reports recent and similar episodes of back pain, but it always diminished with time. Pt is indep with ADLs, mobility, driving, and work, and denies hx of falls. Pt works sedentary job and reports limited amount of exercise/activity d/t back and chest pain. Pt reports every time she takes the stairs, it exacerbates both her R-sided back and midsternal chest pain. Currently, pt demo safety with bed mobility, STS, and short ambulation, as IV pole is attached to bed. Patient's back  pain improves with flexion and sustained stretches, is worse with repetition. Pt would benefit from OPPT upon d/c to address back pain and improve activity tolerance.  -OD       Row Name 11/08/23 1344          Therapy Assessment/Plan (PT)    Criteria for Skilled Interventions Met (PT) no;no problems identified which require skilled intervention  -OD     Therapy Frequency (PT) evaluation only  -OD       Row Name 11/08/23 1344          Vital Signs    O2 Delivery Pre Treatment room air  -OD     O2 Delivery Intra Treatment room air  -OD     O2 Delivery Post Treatment room air  -OD     Pre Patient Position Supine  -OD     Intra Patient Position Standing  -OD     Post Patient Position Supine  -OD       Row Name 11/08/23 1344          Positioning and Restraints    Pre-Treatment Position in bed  -OD     Post Treatment Position bed  -OD     In Bed call light within reach;notified nsg;supine;encouraged to call for assist  -OD               User Key  (r) = Recorded By, (t) = Taken By, (c) = Cosigned By      Initials Name Provider Type    OD Maria Guadalupe English, PT Physical Therapist                   Outcome Measures       Row Name 11/08/23 1349 11/08/23 0800       How much help from another person do you currently need...    Turning from your back to your side while in flat bed without using bedrails? 4  -OD 4  -MR    Moving from lying on back to sitting on the side of a flat bed without bedrails? 4  -OD 4  -MR    Moving to and from a bed to a chair (including a wheelchair)? 4  -OD 4  -MR    Standing up from a chair using your arms (e.g., wheelchair, bedside chair)? 4  -OD 4  -MR    Climbing 3-5 steps with a railing? 4  -OD 4  -MR    To walk in hospital room? 4  -OD 4  -MR    AM-PAC 6 Clicks Score (PT) 24  -OD 24  -MR    Highest level of mobility 8 --> Walked 250 feet or more  -OD 8 --> Walked 250 feet or more  -MR      Row Name 11/08/23 1349          Functional Assessment    Outcome Measure Options AM-PAC 6 Clicks Basic Mobility  (PT)  -OD               User Key  (r) = Recorded By, (t) = Taken By, (c) = Cosigned By      Initials Name Provider Type    MR Jose Rafael Ma Colette, RN Registered Nurse    Maria Guadalupe Bowen, LASHAY Physical Therapist                                 Physical Therapy Education       Title: PT OT SLP Therapies (Done)       Topic: Physical Therapy (Done)       Point: Mobility training (Done)       Learning Progress Summary             Patient Acceptance, E, VU by OD at 11/8/2023 1350                         Point: Home exercise program (Done)       Learning Progress Summary             Patient Acceptance, E, VU by OD at 11/8/2023 1350                         Point: Body mechanics (Done)       Learning Progress Summary             Patient Acceptance, E, VU by OD at 11/8/2023 1350                         Point: Precautions (Done)       Learning Progress Summary             Patient Acceptance, E, VU by OD at 11/8/2023 1350                                         User Key       Initials Effective Dates Name Provider Type Discipline    OD 05/11/23 -  Maria Guadalupe English, LASHAY Physical Therapist PT                  PT Recommendation and Plan     Plan of Care Reviewed With: patient  Progress: improving  Outcome Evaluation: Scarlet Gong is a 69 y/o F who presents to [facility] on 11/7/23 for back pain radiating to her chest and elevated BP. PMH includes HTN, HLD, osteopenia, asthma, sleep apnea. Pt reports recent and similar episodes of back pain, but it always diminished with time. Pt is indep with ADLs, mobility, driving, and work, and denies hx of falls. Pt works sedentary job and reports limited amount of exercise/activity d/t back and chest pain. Pt reports every time she takes the stairs, it exacerbates both her R-sided back and midsternal chest pain. Currently, pt demo safety with bed mobility, STS, and short ambulation, as IV pole is attached to bed. Patient's back pain improves with flexion and sustained stretches, is worse with  repetition. Pt would benefit from OPPT upon d/c to address back pain and improve activity tolerance.     Time Calculation:         PT Charges       Row Name 11/08/23 1350             Time Calculation    Start Time 0959  -OD      Stop Time 1022  -OD      Time Calculation (min) 23 min  -OD      PT Received On 11/08/23  -OD         Time Calculation- PT    Total Timed Code Minutes- PT 0 minute(s)  -OD                User Key  (r) = Recorded By, (t) = Taken By, (c) = Cosigned By      Initials Name Provider Type    OD Maria Guadalupe English, PT Physical Therapist                  Therapy Charges for Today       Code Description Service Date Service Provider Modifiers Qty    78826412447 HC PT EVAL MOD COMPLEXITY 4 11/8/2023 Maria Guadalupe English, PT GP 1            PT G-Codes  Outcome Measure Options: AM-PAC 6 Clicks Basic Mobility (PT)  AM-PAC 6 Clicks Score (PT): 24  PT Discharge Summary  Anticipated Discharge Disposition (PT): home with outpatient therapy services    Maria Guadalupe English, LASHAY  11/8/2023

## 2023-11-08 NOTE — PLAN OF CARE
Goal Outcome Evaluation:    Patient on tridil gtt @ 30mcg/min. Awaiting cardiology's further recommendations and PT evaluation.

## 2023-11-09 ENCOUNTER — READMISSION MANAGEMENT (OUTPATIENT)
Dept: CALL CENTER | Facility: HOSPITAL | Age: 68
End: 2023-11-09
Payer: MEDICARE

## 2023-11-09 ENCOUNTER — APPOINTMENT (OUTPATIENT)
Dept: CARDIOLOGY | Facility: HOSPITAL | Age: 68
End: 2023-11-09
Payer: MEDICARE

## 2023-11-09 VITALS
RESPIRATION RATE: 18 BRPM | OXYGEN SATURATION: 92 % | DIASTOLIC BLOOD PRESSURE: 58 MMHG | BODY MASS INDEX: 44.53 KG/M2 | TEMPERATURE: 98.5 F | HEART RATE: 95 BPM | SYSTOLIC BLOOD PRESSURE: 134 MMHG | HEIGHT: 62 IN | WEIGHT: 242 LBS

## 2023-11-09 LAB
ALBUMIN SERPL-MCNC: 4.1 G/DL (ref 3.5–5.2)
ALBUMIN/GLOB SERPL: 1.3 G/DL
ALP SERPL-CCNC: 81 U/L (ref 39–117)
ALT SERPL W P-5'-P-CCNC: 18 U/L (ref 1–33)
ANION GAP SERPL CALCULATED.3IONS-SCNC: 10 MMOL/L (ref 5–15)
AST SERPL-CCNC: 16 U/L (ref 1–32)
BASOPHILS # BLD AUTO: 0 10*3/MM3 (ref 0–0.2)
BASOPHILS NFR BLD AUTO: 0.2 % (ref 0–1.5)
BH CV REST NUCLEAR ISOTOPE DOSE: 9.8 MCI
BH CV STRESS BP STAGE 1: NORMAL
BH CV STRESS BP STAGE 2: NORMAL
BH CV STRESS COMMENTS STAGE 1: NORMAL
BH CV STRESS COMMENTS STAGE 2: NORMAL
BH CV STRESS DOSE REGADENOSON STAGE 1: 0.4
BH CV STRESS DURATION MIN STAGE 1: 0
BH CV STRESS DURATION MIN STAGE 2: 4
BH CV STRESS DURATION SEC STAGE 1: 10
BH CV STRESS DURATION SEC STAGE 2: 0
BH CV STRESS HR STAGE 1: 83
BH CV STRESS HR STAGE 2: 94
BH CV STRESS NUCLEAR ISOTOPE DOSE: 33 MCI
BH CV STRESS PROTOCOL 1: NORMAL
BH CV STRESS RECOVERY BP: NORMAL MMHG
BH CV STRESS RECOVERY HR: 97 BPM
BH CV STRESS STAGE 1: 1
BH CV STRESS STAGE 2: 2
BILIRUB SERPL-MCNC: 0.5 MG/DL (ref 0–1.2)
BUN SERPL-MCNC: 16 MG/DL (ref 8–23)
BUN/CREAT SERPL: 23.9 (ref 7–25)
CALCIUM SPEC-SCNC: 10.1 MG/DL (ref 8.6–10.5)
CHLORIDE SERPL-SCNC: 94 MMOL/L (ref 98–107)
CO2 SERPL-SCNC: 29 MMOL/L (ref 22–29)
CREAT SERPL-MCNC: 0.67 MG/DL (ref 0.57–1)
DEPRECATED RDW RBC AUTO: 43.8 FL (ref 37–54)
EGFRCR SERPLBLD CKD-EPI 2021: 95.9 ML/MIN/1.73
EOSINOPHIL # BLD AUTO: 0.1 10*3/MM3 (ref 0–0.4)
EOSINOPHIL NFR BLD AUTO: 0.7 % (ref 0.3–6.2)
ERYTHROCYTE [DISTWIDTH] IN BLOOD BY AUTOMATED COUNT: 14.6 % (ref 12.3–15.4)
GEN 5 2HR TROPONIN T REFLEX: 10 NG/L
GLOBULIN UR ELPH-MCNC: 3.2 GM/DL
GLUCOSE SERPL-MCNC: 143 MG/DL (ref 65–99)
HCT VFR BLD AUTO: 38.3 % (ref 34–46.6)
HGB BLD-MCNC: 12.3 G/DL (ref 12–15.9)
LYMPHOCYTES # BLD AUTO: 2.5 10*3/MM3 (ref 0.7–3.1)
LYMPHOCYTES NFR BLD AUTO: 16.7 % (ref 19.6–45.3)
MAXIMAL PREDICTED HEART RATE: 153 BPM
MCH RBC QN AUTO: 27.5 PG (ref 26.6–33)
MCHC RBC AUTO-ENTMCNC: 32.1 G/DL (ref 31.5–35.7)
MCV RBC AUTO: 85.6 FL (ref 79–97)
MONOCYTES # BLD AUTO: 1.2 10*3/MM3 (ref 0.1–0.9)
MONOCYTES NFR BLD AUTO: 7.9 % (ref 5–12)
NEUTROPHILS NFR BLD AUTO: 11.1 10*3/MM3 (ref 1.7–7)
NEUTROPHILS NFR BLD AUTO: 74.5 % (ref 42.7–76)
NRBC BLD AUTO-RTO: 0.2 /100 WBC (ref 0–0.2)
PERCENT MAX PREDICTED HR: 66.01 %
PLATELET # BLD AUTO: 331 10*3/MM3 (ref 140–450)
PMV BLD AUTO: 7.1 FL (ref 6–12)
POTASSIUM SERPL-SCNC: 3.2 MMOL/L (ref 3.5–5.2)
PROCALCITONIN SERPL-MCNC: 0.18 NG/ML (ref 0–0.25)
PROT SERPL-MCNC: 7.3 G/DL (ref 6–8.5)
RBC # BLD AUTO: 4.47 10*6/MM3 (ref 3.77–5.28)
RSV RNA NPH QL NAA+NON-PROBE: NOT DETECTED
SODIUM SERPL-SCNC: 133 MMOL/L (ref 136–145)
STRESS BASELINE BP: NORMAL MMHG
STRESS BASELINE HR: 83 BPM
STRESS PERCENT HR: 78 %
STRESS POST PEAK BP: NORMAL MMHG
STRESS POST PEAK HR: 101 BPM
STRESS TARGET HR: 130 BPM
TROPONIN T DELTA: 2 NG/L
WBC NRBC COR # BLD: 14.8 10*3/MM3 (ref 3.4–10.8)

## 2023-11-09 PROCEDURE — G0378 HOSPITAL OBSERVATION PER HR: HCPCS

## 2023-11-09 PROCEDURE — 84145 PROCALCITONIN (PCT): CPT | Performed by: PHYSICIAN ASSISTANT

## 2023-11-09 PROCEDURE — 93306 TTE W/DOPPLER COMPLETE: CPT | Performed by: INTERNAL MEDICINE

## 2023-11-09 PROCEDURE — 99214 OFFICE O/P EST MOD 30 MIN: CPT | Performed by: INTERNAL MEDICINE

## 2023-11-09 PROCEDURE — 80053 COMPREHEN METABOLIC PANEL: CPT | Performed by: EMERGENCY MEDICINE

## 2023-11-09 PROCEDURE — 85025 COMPLETE CBC W/AUTO DIFF WBC: CPT | Performed by: EMERGENCY MEDICINE

## 2023-11-09 PROCEDURE — 84484 ASSAY OF TROPONIN QUANT: CPT | Performed by: PHYSICIAN ASSISTANT

## 2023-11-09 PROCEDURE — 0 TECHNETIUM TETROFOSMIN KIT: Performed by: EMERGENCY MEDICINE

## 2023-11-09 PROCEDURE — 93306 TTE W/DOPPLER COMPLETE: CPT

## 2023-11-09 PROCEDURE — 87634 RSV DNA/RNA AMP PROBE: CPT | Performed by: PHYSICIAN ASSISTANT

## 2023-11-09 PROCEDURE — A9502 TC99M TETROFOSMIN: HCPCS | Performed by: EMERGENCY MEDICINE

## 2023-11-09 PROCEDURE — 25010000002 REGADENOSON 0.4 MG/5ML SOLUTION: Performed by: EMERGENCY MEDICINE

## 2023-11-09 RX ORDER — GUAIFENESIN 600 MG/1
600 TABLET, EXTENDED RELEASE ORAL 2 TIMES DAILY
Qty: 14 TABLET | Refills: 0 | Status: SHIPPED | OUTPATIENT
Start: 2023-11-09 | End: 2023-11-16

## 2023-11-09 RX ORDER — POTASSIUM CHLORIDE 20 MEQ/1
40 TABLET, EXTENDED RELEASE ORAL ONCE
Status: COMPLETED | OUTPATIENT
Start: 2023-11-09 | End: 2023-11-09

## 2023-11-09 RX ORDER — BENZONATATE 100 MG/1
100 CAPSULE ORAL 3 TIMES DAILY PRN
Qty: 30 CAPSULE | Refills: 0 | Status: SHIPPED | OUTPATIENT
Start: 2023-11-09

## 2023-11-09 RX ORDER — REGADENOSON 0.08 MG/ML
0.4 INJECTION, SOLUTION INTRAVENOUS
Status: COMPLETED | OUTPATIENT
Start: 2023-11-09 | End: 2023-11-09

## 2023-11-09 RX ADMIN — HYDRALAZINE HYDROCHLORIDE 75 MG: 25 TABLET, FILM COATED ORAL at 08:12

## 2023-11-09 RX ADMIN — POTASSIUM CHLORIDE 40 MEQ: 1500 TABLET, EXTENDED RELEASE ORAL at 08:12

## 2023-11-09 RX ADMIN — HYDROCHLOROTHIAZIDE 50 MG: 25 TABLET ORAL at 08:12

## 2023-11-09 RX ADMIN — HYDROXYZINE HYDROCHLORIDE 50 MG: 25 TABLET, FILM COATED ORAL at 08:12

## 2023-11-09 RX ADMIN — REGADENOSON 0.4 MG: 0.08 INJECTION, SOLUTION INTRAVENOUS at 09:27

## 2023-11-09 RX ADMIN — TETROFOSMIN 1 DOSE: 1.38 INJECTION, POWDER, LYOPHILIZED, FOR SOLUTION INTRAVENOUS at 09:27

## 2023-11-09 RX ADMIN — ASPIRIN 81 MG: 81 TABLET, COATED ORAL at 08:12

## 2023-11-09 RX ADMIN — FERROUS SULFATE TAB EC 324 MG (65 MG FE EQUIVALENT) 324 MG: 324 (65 FE) TABLET DELAYED RESPONSE at 08:12

## 2023-11-09 RX ADMIN — Medication 10 ML: at 08:00

## 2023-11-09 NOTE — PROGRESS NOTES
Referring Provider: Quinten Siegel MD    Reason for follow-up:  Chest pain     Patient Care Team:  Antoinette Bernstein APRN as PCP - General (Family Medicine)  Hien Camacho MD as Consulting Physician (Cardiology)    Subjective .      ROS    Since I have last seen, the patient has been without any chest discomfort ,shortness of breath, palpitations, dizziness or syncope.  Denies having any headache ,abdominal pain ,nausea, vomiting , diarrhea constipation, loss of weight or loss of appetite.  Denies having any excessive bruising ,hematuria or blood in the stool.    Review of all systems negative except as indicated    History  Past Medical History:   Diagnosis Date    Asthma 04/27/2016    Hyperlipidemia 03/02/2020    Hypertension 03/02/2020    Obesity (BMI 30-39.9) 03/02/2020    Osteopenia 03/02/2020       Past Surgical History:   Procedure Laterality Date    CARDIAC CATHETERIZATION N/A 03/04/2020    Procedure: Left Heart Cath and coronary angiogram;  Surgeon: Hien Camacho MD;  Location: [facility] CATH INVASIVE LOCATION;  Service: Cardiovascular;  Laterality: N/A;    CARDIAC CATHETERIZATION N/A 03/04/2020    Procedure: Left ventriculography;  Surgeon: Hien Camacho MD;  Location: [facility] CATH INVASIVE LOCATION;  Service: Cardiovascular;  Laterality: N/A;    CARDIAC CATHETERIZATION N/A 1/30/2023    Procedure: Left Heart Cath and coronary angiogram;  Surgeon: Hien Camacho MD;  Location: [facility] CATH INVASIVE LOCATION;  Service: Cardiovascular;  Laterality: N/A;    CARDIAC CATHETERIZATION N/A 1/30/2023    Procedure: Right Heart Cath;  Surgeon: Hien Camacho MD;  Location: [facility] CATH INVASIVE LOCATION;  Service: Cardiovascular;  Laterality: N/A;       Family History   Problem Relation Age of Onset    Hypertension Mother     Heart disease Sister     Heart disease Brother     Colon cancer Maternal Grandmother        Social History     Tobacco Use    Smoking status: Former     Packs/day: 1.50      Years: 32.00     Additional pack years: 0.00     Total pack years: 48.00     Types: Cigarettes     Passive exposure: Past    Smokeless tobacco: Never   Vaping Use    Vaping Use: Never used   Substance Use Topics    Alcohol use: Never    Drug use: Never        Medications Prior to Admission   Medication Sig Dispense Refill Last Dose    albuterol sulfate  (90 Base) MCG/ACT inhaler Inhale 2 puffs Every 6 (Six) Hours As Needed.       aspirin 81 MG EC tablet Take 1 tablet by mouth Daily.       calcium carbonate (OS-JEANA) 600 MG tablet Take 1 tablet by mouth Daily.       Cholecalciferol 50 MCG (2000 UT) capsule Take 2,000 Int'l Units by mouth Daily.       ferrous sulfate 325 (65 FE) MG tablet Take 1 tablet by mouth Daily With Breakfast.       hydrALAZINE (APRESOLINE) 50 MG tablet Take 1.5 tablets by mouth 2 (Two) Times a Day.       hydroCHLOROthiazide (HYDRODIURIL) 50 MG tablet Take 1 tablet by mouth Daily.       hydrOXYzine (ATARAX) 50 MG tablet Take 1 tablet by mouth 2 (Two) Times a Day. 60 tablet 5     metoprolol succinate XL (TOPROL-XL) 25 MG 24 hr tablet Take 1 tablet by mouth Daily. 30 tablet 0     Potassium 99 MG tablet Take 1 tablet by mouth 2 (Two) Times a Day.       zinc gluconate 50 MG tablet Take 1 tablet by mouth Daily.          Allergies  Prednisone, Atorvastatin, Lisinopril, and Azithromycin    Scheduled Meds:aspirin, 81 mg, Oral, Daily  ferrous sulfate, 324 mg, Oral, Daily With Breakfast  hydrALAZINE, 75 mg, Oral, BID  hydroCHLOROthiazide, 50 mg, Oral, Daily  hydrOXYzine, 50 mg, Oral, BID  [Held by provider] metoprolol succinate XL, 25 mg, Oral, Q24H  senna-docusate sodium, 2 tablet, Oral, BID  sodium chloride, 10 mL, Intravenous, Q12H      Continuous Infusions:nitroglycerin, 5-200 mcg/min, Last Rate: Stopped (11/08/23 1800)      PRN Meds:.  acetaminophen    albuterol    senna-docusate sodium **AND** polyethylene glycol **AND** bisacodyl **AND** bisacodyl    melatonin    ondansetron    [COMPLETED]  "Insert Peripheral IV **AND** sodium chloride    sodium chloride    sodium chloride    Objective     VITAL SIGNS  Vitals:    11/08/23 2126 11/08/23 2257 11/09/23 0317 11/09/23 0615   BP: 138/84 148/58 162/62 158/64   BP Location:  Left arm Left arm Left arm   Patient Position:  Lying Lying Lying   Pulse: 92 89 81 78   Resp:  17 15 18   Temp:  98.3 °F (36.8 °C) 98 °F (36.7 °C) 97.8 °F (36.6 °C)   TempSrc:  Temporal Temporal Temporal   SpO2:  90% 98% 99%   Weight:       Height:           Flowsheet Rows      Flowsheet Row First Filed Value   Admission Height 157.5 cm (62\") Documented at 11/07/2023 1452   Admission Weight 104 kg (229 lb 4.5 oz) Documented at 11/07/2023 1452              Intake/Output Summary (Last 24 hours) at 11/9/2023 0641  Last data filed at 11/8/2023 2045  Gross per 24 hour   Intake 540 ml   Output 100 ml   Net 440 ml        TELEMETRY: Sinus rhythm    Physical Exam:  The patient is alert, oriented and in no distress.  Vital signs as noted above.  Exogenous obesity.  Head and neck revealed no carotid bruits or jugular venous distention.  No thyromegaly or lymphadenopathy is present  Lungs clear.  No wheezing.  Breath sounds are normal bilaterally.  Heart normal first and second heart sounds.  No murmur. No precordial rub is present.  No gallop is present.  Abdomen soft and nontender.  No organomegaly is present.  Extremities with good peripheral pulses without any pedal edema.  Skin warm and dry.  Musculoskeletal system is grossly normal  CNS grossly normal    Reviewed and updated.    Results Review:   I reviewed the patient's new clinical results.  Lab Results (last 24 hours)       Procedure Component Value Units Date/Time    Procalcitonin [[id number]] Collected: 11/09/23 0333    Specimen: Blood from Arm, Right Updated: 11/09/23 0636    High Sensitivity Troponin T 2Hr [[id number]]  (Normal) Collected: 11/09/23 0333    Specimen: Blood from Arm, Right Updated: 11/09/23 0439     HS Troponin T 10 ng/L      " Troponin T Delta 2 ng/L     Narrative:      High Sensitive Troponin T Reference Range:  <14.0 ng/L- Negative Female for AMI  <22.0 ng/L- Negative Male for AMI  >=14 - Abnormal Female indicating possible myocardial injury.  >=22 - Abnormal Male indicating possible myocardial injury.   Clinicians would have to utilize clinical acumen, EKG, Troponin, and serial changes to determine if it is an Acute Myocardial Infarction or myocardial injury due to an underlying chronic condition.         Comprehensive Metabolic Panel [[id number]]  (Abnormal) Collected: 11/09/23 0333    Specimen: Blood from Arm, Right Updated: 11/09/23 0439     Glucose 143 mg/dL      BUN 16 mg/dL      Creatinine 0.67 mg/dL      Sodium 133 mmol/L      Potassium 3.2 mmol/L      Chloride 94 mmol/L      CO2 29.0 mmol/L      Calcium 10.1 mg/dL      Total Protein 7.3 g/dL      Albumin 4.1 g/dL      ALT (SGPT) 18 U/L      AST (SGOT) 16 U/L      Alkaline Phosphatase 81 U/L      Total Bilirubin 0.5 mg/dL      Globulin 3.2 gm/dL      A/G Ratio 1.3 g/dL      BUN/Creatinine Ratio 23.9     Anion Gap 10.0 mmol/L      eGFR 95.9 mL/min/1.73     Narrative:      GFR Normal >60  Chronic Kidney Disease <60  Kidney Failure <15      CBC & Differential [[id number]]  (Abnormal) Collected: 11/09/23 0333    Specimen: Blood from Arm, Right Updated: 11/09/23 0406    Narrative:      The following orders were created for panel order CBC & Differential.  Procedure                               Abnormality         Status                     ---------                               -----------         ------                     CBC Auto Differential[[id number]]        Abnormal            Final result                 Please view results for these tests on the individual orders.    CBC Auto Differential [[id number]]  (Abnormal) Collected: 11/09/23 0333    Specimen: Blood from Arm, Right Updated: 11/09/23 0406     WBC 14.80 10*3/mm3      RBC 4.47 10*6/mm3      Hemoglobin 12.3 g/dL       Hematocrit 38.3 %      MCV 85.6 fL      MCH 27.5 pg      MCHC 32.1 g/dL      RDW 14.6 %      RDW-SD 43.8 fl      MPV 7.1 fL      Platelets 331 10*3/mm3      Neutrophil % 74.5 %      Lymphocyte % 16.7 %      Monocyte % 7.9 %      Eosinophil % 0.7 %      Basophil % 0.2 %      Neutrophils, Absolute 11.10 10*3/mm3      Lymphocytes, Absolute 2.50 10*3/mm3      Monocytes, Absolute 1.20 10*3/mm3      Eosinophils, Absolute 0.10 10*3/mm3      Basophils, Absolute 0.00 10*3/mm3      nRBC 0.2 /100 WBC     High Sensitivity Troponin T [[id number]]  (Normal) Collected: 11/08/23 0320    Specimen: Blood from Arm, Right Updated: 11/08/23 0840     HS Troponin T 8 ng/L     Narrative:      High Sensitive Troponin T Reference Range:  <14.0 ng/L- Negative Female for AMI  <22.0 ng/L- Negative Male for AMI  >=14 - Abnormal Female indicating possible myocardial injury.  >=22 - Abnormal Male indicating possible myocardial injury.   Clinicians would have to utilize clinical acumen, EKG, Troponin, and serial changes to determine if it is an Acute Myocardial Infarction or myocardial injury due to an underlying chronic condition.         Urinalysis With Culture If Indicated - Urine, Clean Catch [[id number]]  (Abnormal) Collected: 11/08/23 0803    Specimen: Urine, Clean Catch Updated: 11/08/23 0818     Color, UA Yellow     Appearance, UA Clear     pH, UA 5.5     Specific Gravity, UA 1.035     Glucose, UA Negative     Ketones, UA Negative     Bilirubin, UA Negative     Blood, UA Negative     Protein, UA Negative     Leuk Esterase, UA Negative     Nitrite, UA Negative     Urobilinogen, UA 0.2 E.U./dL    Narrative:      In absence of clinical symptoms, the presence of pyuria, bacteria, and/or nitrites on the urinalysis result does not correlate with infection.  Urine microscopic not indicated.            Imaging Results (Last 24 Hours)       ** No results found for the last 24 hours. **        LAB RESULTS (LAST 7 DAYS)    CBC  Results from last 7 days    Lab Units 11/09/23 0333 11/08/23 0320 11/07/23  1538   WBC 10*3/mm3 14.80* 12.70* 11.20*   RBC 10*6/mm3 4.47 4.15 4.52   HEMOGLOBIN g/dL 12.3 11.6* 12.7   HEMATOCRIT % 38.3 35.8 39.2   MCV fL 85.6 86.3 86.8   PLATELETS 10*3/mm3 331 316 351       BMP  Results from last 7 days   Lab Units 11/09/23 0333 11/08/23 0320 11/07/23  1548 11/07/23  1538   SODIUM mmol/L 133* 138  --  139   POTASSIUM mmol/L 3.2* 4.0  --  4.1   CHLORIDE mmol/L 94* 99  --  99   CO2 mmol/L 29.0 27.0  --  28.0   BUN mg/dL 16 16  --  17   CREATININE mg/dL 0.67 0.56* 0.70 0.75   GLUCOSE mg/dL 143* 122*  --  124*       CMP   Results from last 7 days   Lab Units 11/09/23 0333 11/08/23 0320 11/07/23  1548 11/07/23  1538   SODIUM mmol/L 133* 138  --  139   POTASSIUM mmol/L 3.2* 4.0  --  4.1   CHLORIDE mmol/L 94* 99  --  99   CO2 mmol/L 29.0 27.0  --  28.0   BUN mg/dL 16 16  --  17   CREATININE mg/dL 0.67 0.56* 0.70 0.75   GLUCOSE mg/dL 143* 122*  --  124*   ALBUMIN g/dL 4.1  --   --  4.3   BILIRUBIN mg/dL 0.5  --   --  0.3   ALK PHOS U/L 81  --   --  86   AST (SGOT) U/L 16  --   --  23   ALT (SGPT) U/L 18  --   --  25   LIPASE U/L  --   --   --  26         BNP        TROPONIN  Results from last 7 days   Lab Units 11/09/23 0333   HSTROP T ng/L 10       CoAg  Results from last 7 days   Lab Units 11/07/23  1538   INR  1.00   APTT seconds 30.2*       Creatinine Clearance  Estimated Creatinine Clearance: 95.3 mL/min (by C-G formula based on SCr of 0.67 mg/dL).    ABG        Radiology  CT Angiogram Chest    Result Date: 11/7/2023  1.No acute abnormality is identified within the thorax. Specifically, there is no evidence of acute pulmonary embolism. 2.No acute abnormality of the thoracic aorta. 3.Additional findings as detailed above. Electronically Signed: Po Ashraf MD  11/7/2023 5:07 PM EST  Workstation ID: [id number]         EKG            I personally viewed and interpreted the patient's EKG/Telemetry data:    ECHOCARDIOGRAM:    Results for  orders placed during the hospital encounter of 01/28/23    Adult Transthoracic Echo Complete W/ Cont if Necessary Per Protocol    Interpretation Summary    Left ventricular systolic function is normal. Left ventricular ejection fraction appears to be 61 - 65%.    Left ventricular wall thickness is consistent with mild concentric hypertrophy.    Left ventricular diastolic function was normal.    Estimated right ventricular systolic pressure from tricuspid regurgitation is normal (<35 mmHg). Calculated right ventricular systolic pressure from tricuspid regurgitation is 29 mmHg.          STRESS TEST  Results for orders placed during the hospital encounter of 01/28/23    Stress Test With Myocardial Perfusion One Day    Interpretation Summary    Left ventricular ejection fraction is normal (Calculated EF = 65%).    Myocardial perfusion imaging indicates a small-to-medium-sized, mild-to-moderately severe area of ischemia located in the basal inferior lateral wall.    Diaphragmatic attenuation and GI artifacts are present.    There is no prior study available for comparison.    Findings consistent with a normal ECG stress test.    Abnormal study  Normal EF 65%  Stress did not reach target heart rate, no changes at submaximal stress  No arrhythmias seen  Normal blood pressure response  Stress and rest nuclear images compared with reversibility of the basal to mid inferolateral wall versus diaphragmatic and GI attenuation    Abnormal study  Cannot rule out inferolateral ischemia mild to moderate size and severity  Correlate with clinical symptomatology        Cardiolite (Tc-99m sestamibi) stress test    CARDIAC CATHETERIZATION  Results for orders placed during the hospital encounter of 01/28/23    Cardiac Catheterization/Vascular Study    Narrative  CARDIAC CATHETERIZATION REPORT    DATE OF PROCEDURE:  1/30/2023    INDICATION FOR PROCEDURE:  Shortness of breath  Chest discomfort  Abnormal stress nuclear test    PROCEDURE  PERFORMED:  Right heart catheterization  Left heart catheterization, coronary angiography, left ventriculography  @@  Moderate conscious sedation was utilized with intravenous Versed and fentanyl administered by registered nurse with continuous ECG, pulse oximetry and hemodynamic monitoring supervised by myself throughout the entire procedure.  Conscious sedation time   30 minutes    I was present with the patient for the duration of moderate sedation and supervised staff who had no other duties and monitored the patient for the entire procedure.    @@  PROCEDURE COMMENTS:    Under usual sterile precautions and 1% Xylocaine filtration right femoral vein and femoral artery were percutaneously punctured and a 7 and 5 [place] vascular sheaths were respectively inserted.  Burke-Debi catheter was used to measure right-sided pressures pulmonary capillary wedge pressure and cardiac output using thermodilution technique.  Burke-Debi catheter was removed and subsequently left and right coronary arteriography was performed in varying degrees of obliquity followed by left ventricular angiogram.  Hemostasis was obtained and patient was returned to the room with intact pulses.  No complications were noted.    FINDINGS:    1. HEMODYNAMICS:  Left ventricle end-diastolic pressure is normal.  No gradient was noted across aortic valve.  Mean right atrial pressure 9 pulmonary artery 35/12 mean pulmonary artery 27 pulmonary capital wedge pressure is 20.    2. LEFT VENTRICULOGRAPHY:  Left ventricular size and contractility is normal with ejection fraction of 60%.  No mitral regurgitation is present.    3. CORONARY ANGIOGRAPHY:  Left main coronary artery is normal.  Left anterior descending artery is normal.  Diagonal branch has proximal 50% disease.  Circumflex coronary artery is normal.  Right coronary artery is a large and dominant vessel and is normal.    SUMMARY:  No evidence for pulmonary hypertension is present.  Normal left  ventricular size and contractility with ejection fraction of 60%.  Normal epicardial coronary arteries except for 50% proximal diagonal branch disease.    RECOMMENDATIONS:  Noncardiac work-up.                OTHER:         Assessment & Plan     Principal Problem:    Chest pain      ]]]]]]]]]]]]]]  History  ======  Chest and back discomfort-somewhat atypical.  EKG showed no acute changes.  Troponin levels were negative.     -Chest discomfort-improved.     History of positive stress Cardiolite test prior to cardiac catheterization     Stress Cardiolite test-1/28/202.  Abnormal study  Cannot rule out inferolateral ischemia mild to moderate size and severity  Correlate with clinical symptomatology     Echocardiogram 1/29/2023   Left ventricular systolic function is normal. Left ventricular ejection fraction appears to be 61 - 65%.    Left ventricular wall thickness is consistent with mild concentric hypertrophy.    Left ventricular diastolic function was normal.    Estimated right ventricular systolic pressure from tricuspid regurgitation is normal (<35 mmHg). Calculated right ventricular systolic pressure from tricuspid regurgitation is 29 mmHg.     Cardiac catheterization 1/30/2023  No evidence for pulmonary hypertension.  Normal left ventricular size and contractility with ejection fraction of 60%.  Normal epicardial coronary arteries except for 50% proximal diagonal branch disease.     Cardiac catheterization 3/4/2020 revealed  Normal left ventricular size and contractility with ejection fraction of 60%.  Normal epicardial coronary arteries.     Mild elevation of [id number] CT scan of the chest.     Hypertension dyslipidemia asthma.  Obstructive sleep apnea     Significant hypomagnesemia     Exogenous obesity.     Previous COVID-19 infection     No significant surgical problems     Family history of coronary artery disease     Former smoker     Allergic to Z-Bruce     ============  Plan  ========  Chest  discomfort and back discomfort.-Atypical.  Troponin levels are negative.  EKG showed no acute changes.  Stress Cardiolite test-today  Echocardiogram-today     Hypertension-139/56.    Hypokalemia  K 3.2-11/9/2023  Supplements.     History of hypomagnesemia-improved.     Medications were reviewed and updated.  Current medications include aspirin ferrous sulfate hydralazine hydrochlorothiazide hydroxyzine     Further plan will depend on patient's progress.     Reviewed and updated-11/9/2023  ]]]]]]]]]]]]           Hien Camacho MD  11/09/23  06:41 EST

## 2023-11-09 NOTE — DISCHARGE SUMMARY
[facility]    Deepika Bernsteinn M, APRN    CHIEF COMPLAINT:     Chest pain    HISTORY OF PRESENT ILLNESS:    Obtained from ED provider HPI on 11/7/2023:  68-year-old female presents with back pain radiating into her chest with elevated blood pressure.  She states she has been compliantly taking all her medication and it typically runs 120s.  She denies any recent viral illness.  She took some Tylenol without relief.  Denies any bowel or bladder incontinence retention saddle anesthesia or fever.  No recent trauma or falls    11/08/23:  Patient confirms the HPI noted above including a sudden onset of chest pain described as a heaviness which began at approximately 1300 hrs. on the day of presentation but without any significant exertion or stress.  Pain was located in the upper portion of her chest radiating across the anterior chest towards the right side of her back with some associated dyspnea.  She has had a somewhat chronic nonproductive cough but denies any nausea, vomiting, fever, palpitations, peripheral edema, lightheadedness or near syncope.  Patient does report that her dyspnea and exertional fatigue have been present for quite some time and she has gone through exhaustive work-ups for both her heart and lungs without obvious cause identified at this point.  Patient does not smoke or drink alcohol and confirms compliance with all of her outpatient medical therapies.    11/9/2023:  Patient reports she did generally well overnight though she did experience some mild chest discomfort.  No new or acute symptoms were noted.          Past Medical History:   Diagnosis Date    Asthma 04/27/2016    Hyperlipidemia 03/02/2020    Hypertension 03/02/2020    Obesity (BMI 30-39.9) 03/02/2020    Osteopenia 03/02/2020     Past Surgical History:   Procedure Laterality Date    CARDIAC CATHETERIZATION N/A 03/04/2020    Procedure: Left Heart Cath and coronary angiogram;  Surgeon: Hien Camacho MD;   Location:  [facility] CATH INVASIVE LOCATION;  Service: Cardiovascular;  Laterality: N/A;    CARDIAC CATHETERIZATION N/A 03/04/2020    Procedure: Left ventriculography;  Surgeon: Hien Camacho MD;  Location: [facility] CATH INVASIVE LOCATION;  Service: Cardiovascular;  Laterality: N/A;    CARDIAC CATHETERIZATION N/A 1/30/2023    Procedure: Left Heart Cath and coronary angiogram;  Surgeon: Hien Camacho MD;  Location: [facility] CATH INVASIVE LOCATION;  Service: Cardiovascular;  Laterality: N/A;    CARDIAC CATHETERIZATION N/A 1/30/2023    Procedure: Right Heart Cath;  Surgeon: Hien Camacho MD;  Location: [facility] CATH INVASIVE LOCATION;  Service: Cardiovascular;  Laterality: N/A;     Family History   Problem Relation Age of Onset    Hypertension Mother     Heart disease Sister     Heart disease Brother     Colon cancer Maternal Grandmother      Social History     Tobacco Use    Smoking status: Former     Packs/day: 1.50     Years: 32.00     Additional pack years: 0.00     Total pack years: 48.00     Types: Cigarettes     Passive exposure: Past    Smokeless tobacco: Never   Vaping Use    Vaping Use: Never used   Substance Use Topics    Alcohol use: Never    Drug use: Never     Medications Prior to Admission   Medication Sig Dispense Refill Last Dose    albuterol sulfate  (90 Base) MCG/ACT inhaler Inhale 2 puffs Every 6 (Six) Hours As Needed.       aspirin 81 MG EC tablet Take 1 tablet by mouth Daily.       calcium carbonate (OS-JEANA) 600 MG tablet Take 1 tablet by mouth Daily.       Cholecalciferol 50 MCG (2000 UT) capsule Take 2,000 Int'l Units by mouth Daily.       ferrous sulfate 325 (65 FE) MG tablet Take 1 tablet by mouth Daily With Breakfast.       hydrALAZINE (APRESOLINE) 50 MG tablet Take 1.5 tablets by mouth 2 (Two) Times a Day.       hydroCHLOROthiazide (HYDRODIURIL) 50 MG tablet Take 1 tablet by mouth Daily.       hydrOXYzine (ATARAX) 50 MG tablet Take 1 tablet by mouth 2 (Two) Times a Day. 60 tablet 5      metoprolol succinate XL (TOPROL-XL) 25 MG 24 hr tablet Take 1 tablet by mouth Daily. 30 tablet 0     Potassium 99 MG tablet Take 1 tablet by mouth 2 (Two) Times a Day.       zinc gluconate 50 MG tablet Take 1 tablet by mouth Daily.        Allergies:  Prednisone, Atorvastatin, Lisinopril, and Azithromycin    Immunization History   Administered Date(s) Administered    COVID-19 (PFIZER) Purple Cap Monovalent 06/08/2021, 06/18/2021, 07/09/2021, 07/09/2021, 12/20/2021    Hepatitis A 04/24/2018, 10/24/2018           REVIEW OF SYSTEMS:    Review of Systems   Constitutional: Positive for malaise/fatigue.   HENT: Negative.     Eyes: Negative.    Cardiovascular:  Positive for chest pain.   Respiratory:  Positive for cough and shortness of breath. Negative for sputum production.    Skin: Negative.    Musculoskeletal: Negative.    Gastrointestinal: Negative.    Genitourinary: Negative.    Neurological: Negative.    Psychiatric/Behavioral: Negative.           Vital Signs  Temp:  [97.8 °F (36.6 °C)-100.5 °F (38.1 °C)] 100.5 °F (38.1 °C)  Heart Rate:  [] 95  Resp:  [15-20] 20  BP: (138-164)/(57-84) 147/77          Physical Exam:  Physical Exam  Vitals reviewed.   Constitutional:       General: She is not in acute distress.     Appearance: Normal appearance. She is not ill-appearing, toxic-appearing or diaphoretic.   HENT:      Head: Normocephalic.      Right Ear: External ear normal.      Left Ear: External ear normal.      Nose: Nose normal.      Mouth/Throat:      Mouth: Mucous membranes are moist.   Eyes:      Extraocular Movements: Extraocular movements intact.   Cardiovascular:      Rate and Rhythm: Normal rate and regular rhythm.      Pulses: Normal pulses.   Pulmonary:      Effort: Pulmonary effort is normal.      Breath sounds: Normal breath sounds.   Abdominal:      General: Bowel sounds are normal.      Palpations: Abdomen is soft.   Musculoskeletal:      Cervical back: Normal range of motion.      Right lower  leg: No edema.      Left lower leg: No edema.   Skin:     General: Skin is warm and dry.      Capillary Refill: Capillary refill takes less than 2 seconds.   Neurological:      General: No focal deficit present.      Mental Status: She is alert.   Psychiatric:         Mood and Affect: Mood normal.         Behavior: Behavior normal.         Thought Content: Thought content normal.         Judgment: Judgment normal.           Emotional Behavior:   Normal   Debilities:  None  Results Review:    I reviewed the patient's new clinical results.  Lab Results (most recent)       Procedure Component Value Units Date/Time    High Sensitivity Troponin T [[id number]]  (Normal) Collected: 11/08/23 0320    Specimen: Blood from Arm, Right Updated: 11/08/23 0840     HS Troponin T 8 ng/L     Narrative:      High Sensitive Troponin T Reference Range:  <14.0 ng/L- Negative Female for AMI  <22.0 ng/L- Negative Male for AMI  >=14 - Abnormal Female indicating possible myocardial injury.  >=22 - Abnormal Male indicating possible myocardial injury.   Clinicians would have to utilize clinical acumen, EKG, Troponin, and serial changes to determine if it is an Acute Myocardial Infarction or myocardial injury due to an underlying chronic condition.         Urinalysis With Culture If Indicated - Urine, Clean Catch [[id number]]  (Abnormal) Collected: 11/08/23 0803    Specimen: Urine, Clean Catch Updated: 11/08/23 0818     Color, UA Yellow     Appearance, UA Clear     pH, UA 5.5     Specific Gravity, UA 1.035     Glucose, UA Negative     Ketones, UA Negative     Bilirubin, UA Negative     Blood, UA Negative     Protein, UA Negative     Leuk Esterase, UA Negative     Nitrite, UA Negative     Urobilinogen, UA 0.2 E.U./dL    Narrative:      In absence of clinical symptoms, the presence of pyuria, bacteria, and/or nitrites on the urinalysis result does not correlate with infection.  Urine microscopic not indicated.    Basic Metabolic Panel [[id number]]   (Abnormal) Collected: 11/08/23 0320    Specimen: Blood from Arm, Right Updated: 11/08/23 0455     Glucose 122 mg/dL      BUN 16 mg/dL      Creatinine 0.56 mg/dL      Sodium 138 mmol/L      Potassium 4.0 mmol/L      Chloride 99 mmol/L      CO2 27.0 mmol/L      Calcium 9.7 mg/dL      BUN/Creatinine Ratio 28.6     Anion Gap 12.0 mmol/L      eGFR 100.2 mL/min/1.73     Narrative:      GFR Normal >60  Chronic Kidney Disease <60  Kidney Failure <15      CBC Auto Differential [[id number]]  (Abnormal) Collected: 11/08/23 0320    Specimen: Blood from Arm, Right Updated: 11/08/23 0440     WBC 12.70 10*3/mm3      RBC 4.15 10*6/mm3      Hemoglobin 11.6 g/dL      Hematocrit 35.8 %      MCV 86.3 fL      MCH 28.0 pg      MCHC 32.5 g/dL      RDW 14.6 %      RDW-SD 43.8 fl      MPV 7.3 fL      Platelets 316 10*3/mm3      Neutrophil % 72.7 %      Lymphocyte % 19.4 %      Monocyte % 6.1 %      Eosinophil % 0.7 %      Basophil % 1.1 %      Neutrophils, Absolute 9.20 10*3/mm3      Lymphocytes, Absolute 2.50 10*3/mm3      Monocytes, Absolute 0.80 10*3/mm3      Eosinophils, Absolute 0.10 10*3/mm3      Basophils, Absolute 0.10 10*3/mm3      nRBC 0.1 /100 WBC     Single High Sensitivity Troponin T [[id number]]  (Normal) Collected: 11/07/23 1820    Specimen: Blood Updated: 11/07/23 1843     HS Troponin T 11 ng/L     Narrative:      High Sensitive Troponin T Reference Range:  <14.0 ng/L- Negative Female for AMI  <22.0 ng/L- Negative Male for AMI  >=14 - Abnormal Female indicating possible myocardial injury.  >=22 - Abnormal Male indicating possible myocardial injury.   Clinicians would have to utilize clinical acumen, EKG, Troponin, and serial changes to determine if it is an Acute Myocardial Infarction or myocardial injury due to an underlying chronic condition.         POC Creatinine [[id number]]  (Normal) Collected: 11/07/23 1548    Specimen: Venous Blood Updated: 11/07/23 1653     Creatinine 0.70 mg/dL      Comment: Serial Number:  [id number]:  [id number]        eGFR 94.9 mL/min/1.73     Comprehensive Metabolic Panel [[id number]]  (Abnormal) Collected: 11/07/23 1538    Specimen: Blood Updated: 11/07/23 1615     Glucose 124 mg/dL      BUN 17 mg/dL      Creatinine 0.75 mg/dL      Sodium 139 mmol/L      Potassium 4.1 mmol/L      Chloride 99 mmol/L      CO2 28.0 mmol/L      Calcium 10.4 mg/dL      Total Protein 7.6 g/dL      Albumin 4.3 g/dL      ALT (SGPT) 25 U/L      AST (SGOT) 23 U/L      Alkaline Phosphatase 86 U/L      Total Bilirubin 0.3 mg/dL      Globulin 3.3 gm/dL      A/G Ratio 1.3 g/dL      BUN/Creatinine Ratio 22.7     Anion Gap 12.0 mmol/L      eGFR 87.4 mL/min/1.73     Narrative:      GFR Normal >60  Chronic Kidney Disease <60  Kidney Failure <15      Lipase [[id number]]  (Normal) Collected: 11/07/23 1538    Specimen: Blood Updated: 11/07/23 1615     Lipase 26 U/L     Single High Sensitivity Troponin T [[id number]]  (Normal) Collected: 11/07/23 1538    Specimen: Blood Updated: 11/07/23 1615     HS Troponin T 11 ng/L     Narrative:      High Sensitive Troponin T Reference Range:  <14.0 ng/L- Negative Female for AMI  <22.0 ng/L- Negative Male for AMI  >=14 - Abnormal Female indicating possible myocardial injury.  >=22 - Abnormal Male indicating possible myocardial injury.   Clinicians would have to utilize clinical acumen, EKG, Troponin, and serial changes to determine if it is an Acute Myocardial Infarction or myocardial injury due to an underlying chronic condition.         Protime-INR [[id number]]  (Normal) Collected: 11/07/23 1538    Specimen: Blood Updated: 11/07/23 1613     Protime 10.9 Seconds      INR 1.00    aPTT [[id number]]  (Abnormal) Collected: 11/07/23 1538    Specimen: Blood Updated: 11/07/23 1613     PTT 30.2 seconds     CBC & Differential [[id number]]  (Abnormal) Collected: 11/07/23 1538    Specimen: Blood Updated: 11/07/23 1545    Narrative:      The following orders were created for panel order CBC &  Differential.  Procedure                               Abnormality         Status                     ---------                               -----------         ------                     CBC Auto Differential[[id number]]        Abnormal            Final result                 Please view results for these tests on the individual orders.    CBC Auto Differential [[id number]]  (Abnormal) Collected: 11/07/23 1538    Specimen: Blood Updated: 11/07/23 1545     WBC 11.20 10*3/mm3      RBC 4.52 10*6/mm3      Hemoglobin 12.7 g/dL      Hematocrit 39.2 %      MCV 86.8 fL      MCH 28.0 pg      MCHC 32.3 g/dL      RDW 14.9 %      RDW-SD 44.6 fl      MPV 6.7 fL      Platelets 351 10*3/mm3      Neutrophil % 63.8 %      Lymphocyte % 26.9 %      Monocyte % 6.7 %      Eosinophil % 2.1 %      Basophil % 0.5 %      Neutrophils, Absolute 7.10 10*3/mm3      Lymphocytes, Absolute 3.00 10*3/mm3      Monocytes, Absolute 0.70 10*3/mm3      Eosinophils, Absolute 0.20 10*3/mm3      Basophils, Absolute 0.10 10*3/mm3      nRBC 0.0 /100 WBC             Imaging Results (Most Recent)       Procedure Component Value Units Date/Time    CT Angiogram Chest [[id number]] Collected: 11/07/23 1656     Updated: 11/07/23 1709    Narrative:      CT ANGIOGRAM CHEST    Date of Exam: 11/7/2023 4:29 PM EST    Indication: chest pain radiating to her back, hypertensive (220/80).    Comparison: None available.    Technique: CTA of the chest was performed after the uneventful intravenous administration of iodinated contrast. Reconstructed coronal and sagittal images were also obtained. In addition, a 3-D volume rendered image was created for interpretation.   Automated exposure control and iterative reconstruction methods were used.    FINDINGS:    Thoracic inlet: Unremarkable.    Pulmonary arteries: No filling defects are identified within the pulmonary arteries to suggest acute pulmonary embolism.    Great vessels: The thoracic aorta and proximal arch vessels  appear unremarkable.    Mediastinum/Nidia: No pathologically enlarged mediastinal lymph nodes are seen. The esophagus appears unremarkable.    Lung parenchyma: The lungs appear adequately aerated. No acute consolidation is seen. No suspicious pulmonary nodules are seen.    Trachea and airways: Fine weblike defect within the right mainstem bronchus, likely related to respiratory secretions. The central airways are otherwise unremarkable.    Pleural space: No significant pleural effusion or pneumothorax is seen.    Heart and pericardium: The heart and pericardium appear unremarkable.    Chest wall: No acute or suspicious osseous or soft tissue lesion is identified.    Upper abdomen: No acute abnormality is identified within the visualized upper abdomen.      Impression:      1.No acute abnormality is identified within the thorax. Specifically, there is no evidence of acute pulmonary embolism.  2.No acute abnormality of the thoracic aorta.  3.Additional findings as detailed above.      Electronically Signed: Po Ashraf MD    11/7/2023 5:07 PM EST    Workstation ID: [id number]          reviewed    ECG/EMG Results (most recent)       Procedure Component Value Units Date/Time    SCANNED - TELEMETRY   [[id number]] Resulted: 11/07/23     Updated: 11/08/23 0650    ECG 12 Lead Chest Pain [[id number]] Collected: 11/07/23 1531     Updated: 11/08/23 0655     QT Interval 354 ms      QTC Interval 414 ms     Narrative:      HEART RATE= 82  bpm  RR Interval= 732  ms  LA Interval= 181  ms  P Horizontal Axis= 3  deg  P Front Axis= 56  deg  QRSD Interval= 87  ms  QT Interval= 354  ms  QTcB= 414  ms  QRS Axis= 42  deg  T Wave Axis= 48  deg  - NORMAL ECG -  Sinus rhythm  When compared with ECG of 28-Jan-2023 15:38:17,  Significant rate increase  Electronically Signed By: Brijesh Quan ([facility]) 08-Nov-2023 06:55:16  Date and Time of Study: 2023-11-07 15:31:14    SCANNED - TELEMETRY   [[id number]] Resulted: 11/07/23     Updated: 11/09/23 0716     SCANNED - TELEMETRY   [[id number]] Resulted: 11/07/23     Updated: 11/09/23 0716    SCANNED - TELEMETRY   [[id number]] Resulted: 11/07/23     Updated: 11/09/23 1305    SCANNED - TELEMETRY   [[id number]] Resulted: 11/07/23     Updated: 11/09/23 1307    SCANNED - TELEMETRY   [[id number]] Resulted: 11/07/23     Updated: 11/09/23 1308    SCANNED - TELEMETRY   [[id number]] Resulted: 11/07/23     Updated: 11/09/23 1308          reviewed    Results for orders placed during the hospital encounter of 06/22/23    Doppler Ankle Brachial Index Single Level CAR    Interpretation Summary    Right Conclusion: The right JALEN is normal. Normal digital pressures.    Left Conclusion: The left JALEN is normal. Normal digital pressures.      Results for orders placed during the hospital encounter of 01/28/23    Adult Transthoracic Echo Complete W/ Cont if Necessary Per Protocol    Interpretation Summary    Left ventricular systolic function is normal. Left ventricular ejection fraction appears to be 61 - 65%.    Left ventricular wall thickness is consistent with mild concentric hypertrophy.    Left ventricular diastolic function was normal.    Estimated right ventricular systolic pressure from tricuspid regurgitation is normal (<35 mmHg). Calculated right ventricular systolic pressure from tricuspid regurgitation is 29 mmHg.      Microbiology Results (last 10 days)       ** No results found for the last 240 hours. **            Assessment & Plan     Chest pain       Chest pain  Lab Results   Component Value Date    TROPONINT 10 11/09/2023    TROPONINT 8 11/08/2023    TROPONINT 11 11/07/2023   -Lipase: 26  -WBCs: 11.20 trended to 12.70 with an increased absolute neutrophil count noted on differential  -UA showed an elevated specific gravity 1.035 but was otherwise unremarkable  -CTA of chest showed no acute abnormality specifically no evidence of acute pulmonary embolism with no acute abnormality of the thoracic aorta showed fine weblike  defect in the right mainstem bronchus noted is likely related to respiratory secretions  -EKG: Sinus rhythm at 82 without obvious acute changes and a QTc of 414 ms  -In the ED pt given home blood pressure medications as well as IV morphine and Tridil infusion  -Cardiology consulted  -Stress Test reported with an EF of 85% with uniform distribution of radionucleotide though cardiologist notes other images showed reversibility probably due to artifact with normal left ventricular size and contractility and recommendations for outpatient follow-up in 2 weeks  -Echocardiogram ordered by cardiologist  -Telemetry  -NPO  -Continue aspirin and beta-blocker once cardiology recommendations received    Hypertension  -Improving control noted but patient remains on Tridil infusion  BP Readings from Last 1 Encounters:   11/09/23 147/77   - Continue hydralazine, hydrochlorothiazide and resume metoprolol after evaluation by cardiologist  - Monitor while admitted    COPD  -Albuterol    Morbid obesity (BMI: 44.31)  -Encouraged lifestyle modifications    I discussed the patients findings and my recommendations with patient and nursing staff.     Discharge Diagnosis:      Chest pain      Hospital Course  Patient is a 68 y.o. female presented with chest pain with an HPI noted above.  Serial troponins were assessed and found to be 11, 11, 8 with a lipase within normal limits at 26.  WBCs trended up from 11.20 on presentation to 12.70 on the morning following with an increased absolute neutrophil count noted on differential.  UA showed elevated specific gravity of 1.035 but was otherwise unremarkable.  CTA of chest showed no acute abnormality specifically no evidence of acute pulmonary embolism or abnormality of the thoracic aorta though a fine weblike defect in the right mainstem bronchus noted is likely related to respiratory secretions was present.  She is given her home blood pressure medication as well as IV morphine and Tridil  infusion with improvement in blood pressure and pain reported.  Cardiology was consulted who evaluated patient who ordered stress test with resting images obtained on 11/8/2023 and test completed on 11/29/2023 which was reported with uniform radionucleotide uptake although some images were reported with reversibility per cardiologist who notes normal left ventricular size and contractility with an EF of 85% and recommends outpatient follow-up in 2 weeks.  Echocardiogram was also obtained.  At this time patient is felt to be in good condition for discharge with close follow-up with her PCP as well as cardiology on an outpatient basis.  Her full testing/results and plan were discussed with patient along with concerning/alarm symptoms for which to call 911/return to the ED. Mucinex and Tessalon will be ordered at discharge.  All questions were answered and she verbalizes her understanding and agreement.    Past Medical History:     Past Medical History:   Diagnosis Date    Asthma 04/27/2016    Hyperlipidemia 03/02/2020    Hypertension 03/02/2020    Obesity (BMI 30-39.9) 03/02/2020    Osteopenia 03/02/2020       Past Surgical History:     Past Surgical History:   Procedure Laterality Date    CARDIAC CATHETERIZATION N/A 03/04/2020    Procedure: Left Heart Cath and coronary angiogram;  Surgeon: Hien Camacho MD;  Location: [facility] CATH INVASIVE LOCATION;  Service: Cardiovascular;  Laterality: N/A;    CARDIAC CATHETERIZATION N/A 03/04/2020    Procedure: Left ventriculography;  Surgeon: Hien Camacho MD;  Location: [facility] CATH INVASIVE LOCATION;  Service: Cardiovascular;  Laterality: N/A;    CARDIAC CATHETERIZATION N/A 1/30/2023    Procedure: Left Heart Cath and coronary angiogram;  Surgeon: Hien Camacho MD;  Location: [facility] CATH INVASIVE LOCATION;  Service: Cardiovascular;  Laterality: N/A;    CARDIAC CATHETERIZATION N/A 1/30/2023    Procedure: Right Heart Cath;  Surgeon: Hien Camacho MD;  Location:   [facility] CATH INVASIVE LOCATION;  Service: Cardiovascular;  Laterality: N/A;       Social History:   Social History     Socioeconomic History    Marital status:    Tobacco Use    Smoking status: Former     Packs/day: 1.50     Years: 32.00     Additional pack years: 0.00     Total pack years: 48.00     Types: Cigarettes     Passive exposure: Past    Smokeless tobacco: Never   Vaping Use    Vaping Use: Never used   Substance and Sexual Activity    Alcohol use: Never    Drug use: Never    Sexual activity: Defer       Procedures Performed         Consults:   Consults       Date and Time Order Name Status Description    11/7/2023  6:52 PM Inpatient Cardiology Consult Completed             Condition on Discharge:     Stable    Discharge Disposition      Discharge Medications     Discharge Medications        ASK your doctor about these medications        Instructions Start Date   albuterol sulfate  (90 Base) MCG/ACT inhaler  Commonly known as: PROVENTIL HFA;VENTOLIN HFA;PROAIR HFA   2 puffs, Inhalation, Every 6 Hours PRN      aspirin 81 MG EC tablet   81 mg, Oral, Daily      calcium carbonate 600 MG tablet  Commonly known as: OS-JEANA   600 mg, Oral, Daily      Cholecalciferol 50 MCG (2000 UT) capsule   2,000 Int'l Units, Oral, Daily      ferrous sulfate 325 (65 FE) MG tablet   325 mg, Oral, Daily With Breakfast      hydrALAZINE 50 MG tablet  Commonly known as: APRESOLINE   75 mg, Oral, 2 Times Daily      hydroCHLOROthiazide 50 MG tablet  Commonly known as: HYDRODIURIL   50 mg, Oral, Daily      hydrOXYzine 50 MG tablet  Commonly known as: ATARAX   50 mg, Oral, 2 Times Daily      metoprolol succinate XL 25 MG 24 hr tablet  Commonly known as: TOPROL-XL   25 mg, Oral, Every 24 Hours Scheduled      Potassium 99 MG tablet   1 tablet, Oral, 2 Times Daily      zinc gluconate 50 MG tablet   50 mg, Oral, Daily               Discharge Diet:     Activity at Discharge:     Follow-up Appointments  Future Appointments   Date  Time Provider Department Center   3/21/2024  3:50 PM Hien Camacho MD [facility]         Test Results Pending at Discharge       Risk for Readmission (LACE) Score: 4 (11/9/2023  6:00 AM)      Greater than 30 minutes spent in discharge activities for this patient    Mike Greenwood PA-C  11/09/23  14:11 EST

## 2023-11-09 NOTE — PLAN OF CARE
Problem: Adult Inpatient Plan of Care  Goal: Plan of Care Review  Outcome: Ongoing, Progressing  Goal: Patient-Specific Goal (Individualized)  Outcome: Ongoing, Progressing  Goal: Absence of Hospital-Acquired Illness or Injury  Outcome: Ongoing, Progressing  Intervention: Identify and Manage Fall Risk  Recent Flowsheet Documentation  Taken 11/8/2023 2230 by Kate Hernandez RN  Safety Promotion/Fall Prevention: safety round/check completed  Taken 11/8/2023 2045 by Kate Hernandez RN  Safety Promotion/Fall Prevention: safety round/check completed  Intervention: Prevent Skin Injury  Recent Flowsheet Documentation  Taken 11/8/2023 2045 by Kate Hernandez RN  Body Position: sitting up in bed  Skin Protection: adhesive use limited  Intervention: Prevent and Manage VTE (Venous Thromboembolism) Risk  Recent Flowsheet Documentation  Taken 11/8/2023 2045 by Kate Hernandez RN  Activity Management:   ambulated in room   sitting, edge of bed   up ad ander   up to bedside commode   activity encouraged  Range of Motion: active ROM (range of motion) encouraged  Goal: Optimal Comfort and Wellbeing  Outcome: Ongoing, Progressing  Intervention: Provide Person-Centered Care  Recent Flowsheet Documentation  Taken 11/8/2023 2045 by Kate Hernandez RN  Trust Relationship/Rapport:   care explained   choices provided   questions answered   questions encouraged  Goal: Readiness for Transition of Care  Outcome: Ongoing, Progressing     Problem: Pain Acute  Goal: Acceptable Pain Control and Functional Ability  Outcome: Ongoing, Progressing  Intervention: Prevent or Manage Pain  Recent Flowsheet Documentation  Taken 11/8/2023 2045 by Kate Hernandez RN  Sensory Stimulation Regulation:   care clustered   television on  Sleep/Rest Enhancement:   awakenings minimized   room darkened  Medication Review/Management: medications reviewed  Intervention: Optimize Psychosocial Wellbeing  Recent Flowsheet  Documentation  Taken 11/8/2023 2045 by Kate Hernandez RN  Supportive Measures: active listening utilized  Diversional Activities:   television   smartphone     Problem: Chest Pain  Goal: Resolution of Chest Pain Symptoms  Outcome: Ongoing, Progressing     Problem: Breathing Pattern Ineffective  Goal: Effective Breathing Pattern  Outcome: Ongoing, Progressing  Intervention: Promote Improved Breathing Pattern  Recent Flowsheet Documentation  Taken 11/8/2023 2045 by Kate Hernandez RN  Supportive Measures: active listening utilized  Head of Bed (HOB) Positioning: HOB elevated     Problem: Fall Injury Risk  Goal: Absence of Fall and Fall-Related Injury  Outcome: Ongoing, Progressing  Intervention: Identify and Manage Contributors  Recent Flowsheet Documentation  Taken 11/8/2023 2045 by Kate Hernandez RN  Medication Review/Management: medications reviewed  Intervention: Promote Injury-Free Environment  Recent Flowsheet Documentation  Taken 11/8/2023 2230 by Kate Hernandez, RN  Safety Promotion/Fall Prevention: safety round/check completed  Taken 11/8/2023 2045 by Kate Hernandez RN  Safety Promotion/Fall Prevention: safety round/check completed   Goal Outcome Evaluation:         Patient resting quietly in bed, denies pain or distress, no reports of chest pain noted this shift, patient up ad andre independently, sitting up on the side of the bed intermitted, states it helps he back, safety maintained call light in reach

## 2023-11-09 NOTE — OUTREACH NOTE
Prep Survey      Flowsheet Row Responses   [facility] patient discharged from? [facility]   Is LACE score < 7 ? Yes   Eligibility [facility]   Date of Admission 11/07/23   Date of Discharge 11/09/23   Discharge Disposition Home-Health Care Svc   Discharge diagnosis Chest pain   Does the patient have one of the following disease processes/diagnoses(primary or secondary)? Other   Does the patient have Home health ordered? Yes   What is the Home health agency?  [facility] [place]   Is there a DME ordered? No   Prep survey completed? Yes            MARTHA GARCIA - Registered Nurse

## 2023-11-10 ENCOUNTER — TRANSITIONAL CARE MANAGEMENT TELEPHONE ENCOUNTER (OUTPATIENT)
Dept: CALL CENTER | Facility: HOSPITAL | Age: 68
End: 2023-11-10
Payer: MEDICARE

## 2023-11-10 LAB
BH CV ECHO MEAS - ACS: 1.68 CM
BH CV ECHO MEAS - AO MAX PG: 11.4 MMHG
BH CV ECHO MEAS - AO MEAN PG: 6.6 MMHG
BH CV ECHO MEAS - AO ROOT DIAM: 2.8 CM
BH CV ECHO MEAS - AO V2 MAX: 169 CM/SEC
BH CV ECHO MEAS - AO V2 VTI: 33.3 CM
BH CV ECHO MEAS - AVA(I,D): 1.63 CM2
BH CV ECHO MEAS - EDV(CUBED): 72.8 ML
BH CV ECHO MEAS - EDV(MOD-SP4): 33.9 ML
BH CV ECHO MEAS - EF(MOD-BP): 59 %
BH CV ECHO MEAS - EF(MOD-SP4): 58.7 %
BH CV ECHO MEAS - ESV(CUBED): 25.8 ML
BH CV ECHO MEAS - ESV(MOD-SP4): 14 ML
BH CV ECHO MEAS - FS: 29.3 %
BH CV ECHO MEAS - IVS/LVPW: 1.13 CM
BH CV ECHO MEAS - IVSD: 1.05 CM
BH CV ECHO MEAS - LA DIMENSION: 3.8 CM
BH CV ECHO MEAS - LV DIASTOLIC VOL/BSA (35-75): 16.4 CM2
BH CV ECHO MEAS - LV MASS(C)D: 133.7 GRAMS
BH CV ECHO MEAS - LV MAX PG: 4.6 MMHG
BH CV ECHO MEAS - LV MEAN PG: 3.4 MMHG
BH CV ECHO MEAS - LV SYSTOLIC VOL/BSA (12-30): 6.8 CM2
BH CV ECHO MEAS - LV V1 MAX: 107.2 CM/SEC
BH CV ECHO MEAS - LV V1 VTI: 23.6 CM
BH CV ECHO MEAS - LVIDD: 4.2 CM
BH CV ECHO MEAS - LVIDS: 3 CM
BH CV ECHO MEAS - LVOT AREA: 2.31 CM2
BH CV ECHO MEAS - LVOT DIAM: 1.71 CM
BH CV ECHO MEAS - LVPWD: 0.93 CM
BH CV ECHO MEAS - MV A MAX VEL: 91.9 CM/SEC
BH CV ECHO MEAS - MV DEC SLOPE: 463.7 CM/SEC2
BH CV ECHO MEAS - MV DEC TIME: 0.16 SEC
BH CV ECHO MEAS - MV E MAX VEL: 75.8 CM/SEC
BH CV ECHO MEAS - MV E/A: 0.82
BH CV ECHO MEAS - MV MAX PG: 4.5 MMHG
BH CV ECHO MEAS - MV MEAN PG: 2.28 MMHG
BH CV ECHO MEAS - MV V2 VTI: 21.8 CM
BH CV ECHO MEAS - MVA(VTI): 2.5 CM2
BH CV ECHO MEAS - PA ACC TIME: 0.08 SEC
BH CV ECHO MEAS - PA V2 MAX: 115.8 CM/SEC
BH CV ECHO MEAS - PULM A REVS DUR: 0.09 SEC
BH CV ECHO MEAS - PULM A REVS VEL: 27 CM/SEC
BH CV ECHO MEAS - PULM DIAS VEL: 38.5 CM/SEC
BH CV ECHO MEAS - PULM S/D: 1.57
BH CV ECHO MEAS - PULM SYS VEL: 60.5 CM/SEC
BH CV ECHO MEAS - RAP SYSTOLE: 3 MMHG
BH CV ECHO MEAS - RV MAX PG: 3 MMHG
BH CV ECHO MEAS - RV V1 MAX: 86 CM/SEC
BH CV ECHO MEAS - RV V1 VTI: 15.1 CM
BH CV ECHO MEAS - RVDD: 2.7 CM
BH CV ECHO MEAS - RVSP: 13.7 MMHG
BH CV ECHO MEAS - SI(MOD-SP4): 9.6 ML/M2
BH CV ECHO MEAS - SV(LVOT): 54.4 ML
BH CV ECHO MEAS - SV(MOD-SP4): 19.9 ML
BH CV ECHO MEAS - TR MAX PG: 10.7 MMHG
BH CV ECHO MEAS - TR MAX VEL: 162.3 CM/SEC

## 2023-11-10 NOTE — CASE MANAGEMENT/SOCIAL WORK
Discharge Planning Assessment   [facility]     Patient Name: Scarlet Gong  MRN: [id number]  Today's Date: 11/10/2023    Admit Date: 11/7/2023    Plan: Return home. Referral to [facility] [place] [place]   Discharge Needs Assessment    No documentation.                  Discharge Plan       Row Name 11/10/23 1003       Plan    Final Discharge Disposition Code 01 - home or self-care    Final Note Home with OPPT @ [facility] in [place]                  Continued Care and Services - Discharged on 11/9/2023 Admission date: 11/7/2023 - Discharge disposition: Home or Self Care      Therapy Coordination complete.      Service Provider Request Status Selected Services Address Phone Fax Patient Preferred    [facility] [place]  Selected Outpatient Physical Therapy [address] [place] [id number] [phone] --                Gucci Rod RN     Cell number [phone]  Office number [phone]

## 2023-11-10 NOTE — OUTREACH NOTE
Call Center TCM Note      Flowsheet Row Responses   [facility] patient discharged from? [facility]   Does the patient have one of the following disease processes/diagnoses(primary or secondary)? Other   TCM attempt successful? Yes   Call start time 1340   Call end time 1342   Discharge diagnosis Chest pain   Meds reviewed with patient/caregiver? Yes   Is the patient having any side effects they believe may be caused by any medication additions or changes? No   Does the patient have all medications ordered at discharge? Yes   Is the patient taking all medications as directed (includes completed medication regime)? Yes   Medication comments will ready today   Comments HOSP DC FU appt 11/17/23 1 pm.   Does the patient have an appointment with their PCP within 7-14 days of discharge? Yes   What is the Home health agency?  [facility] [place]   Has home health visited the patient within 72 hours of discharge? N/A   Psychosocial issues? No   Did the patient receive a copy of their discharge instructions? Yes   Nursing interventions Reviewed instructions with patient   What is the patient's perception of their health status since discharge? Improving   Is the patient/caregiver able to teach back signs and symptoms related to disease process for when to call PCP? Yes   Is the patient/caregiver able to teach back signs and symptoms related to disease process for when to call 911? Yes   Is the patient/caregiver able to teach back the hierarchy of who to call/visit for symptoms/problems? PCP, Specialist, Home health nurse, Urgent Care, ED, 911 Yes   TCM call completed? Yes   Wrap up additional comments Stomache sore from coughing. No chest pain. Meds will be ready today   Call end time 1342            Anitha Riggins RN    11/10/2023, 13:43 EST

## 2023-11-10 NOTE — OUTREACH NOTE
Call Center TCM Note      Flowsheet Row Responses   [facility] patient discharged from? [facility]   Does the patient have one of the following disease processes/diagnoses(primary or secondary)? Other   TCM attempt successful? No  [[id number]]   Unsuccessful attempts Attempt 1            Anitha Riggins RN    11/10/2023, 09:31 EST

## 2023-11-13 NOTE — CASE MANAGEMENT/SOCIAL WORK
Case Management Discharge Note      Final Note: Home with OPPT @ [facility] in [place]    Provided Post Acute Provider List?: Yes  Post Acute Provider List: Outpatient Therapy  Delivered To: Patient  Method of Delivery: In person    Selected Continued Care - Discharged on 11/9/2023 Admission date: 11/7/2023 - Discharge disposition: Home or Self Care        Therapy Coordination complete.      Service Provider Selected Services Address Phone Fax Patient Preferred    [facility] [place] [facility] [address] [place] [place] [address] [phone] --               Transportation Services  Private: Car    Final Discharge Disposition Code: 01 - home or self-care

## 2023-11-17 ENCOUNTER — OFFICE VISIT (OUTPATIENT)
Dept: FAMILY MEDICINE CLINIC | Facility: CLINIC | Age: 68
End: 2023-11-17
Payer: MEDICARE

## 2023-11-17 VITALS
HEART RATE: 76 BPM | RESPIRATION RATE: 16 BRPM | DIASTOLIC BLOOD PRESSURE: 74 MMHG | SYSTOLIC BLOOD PRESSURE: 128 MMHG | HEIGHT: 62 IN | WEIGHT: 223.2 LBS | OXYGEN SATURATION: 97 % | BODY MASS INDEX: 41.07 KG/M2

## 2023-11-17 DIAGNOSIS — R14.0 ABDOMINAL BLOATING: ICD-10-CM

## 2023-11-17 DIAGNOSIS — I20.2 REFRACTORY ANGINA PECTORIS: ICD-10-CM

## 2023-11-17 DIAGNOSIS — Z09 HOSPITAL DISCHARGE FOLLOW-UP: Primary | ICD-10-CM

## 2023-11-17 DIAGNOSIS — R05.9 COUGH, UNSPECIFIED TYPE: ICD-10-CM

## 2023-11-17 RX ORDER — L.ACID/L.CASEI/B.BIF/B.LON/FOS 2B CELL-50
1 CAPSULE ORAL DAILY
Qty: 90 CAPSULE | Refills: 3 | Status: SHIPPED | OUTPATIENT
Start: 2023-11-17

## 2023-11-17 RX ORDER — BENZONATATE 100 MG/1
100 CAPSULE ORAL 3 TIMES DAILY PRN
Qty: 30 CAPSULE | Refills: 1 | Status: SHIPPED | OUTPATIENT
Start: 2023-11-17

## 2023-11-17 NOTE — PROGRESS NOTES
Transitional Care Follow Up Visit  Subjective     Scarlet Gong is a 68 y.o. female who presents for a transitional care management visit.    Within 48 business hours after discharge our office contacted her via telephone to coordinate her care and needs.      I reviewed and discussed the details of that call along with the discharge summary, hospital problems, inpatient lab results, inpatient diagnostic studies, and consultation reports with Scarlet.     Current outpatient and discharge medications have been reconciled for the patient.  Reviewed by: ANGELINA Gresham    She endorses today that she is feeling much better.  She is not having any more pain and her cough has subsided.  Additionally she tells me that the abdominal pain and bloating is resolved.    BP Readings from Last 3 Encounters:   11/17/23 128/74   11/09/23 134/58   09/19/23 156/79          11/9/2023     6:25 PM   Date of TCM Phone Call   [facility]   Date of Admission 11/7/2023   Date of Discharge 11/9/2023   Discharge Disposition [place] [facility]     Risk for Readmission (LACE) Score: 4 (11/9/2023  6:00 AM)      History of Present Illness   Course During Hospital Stay:  11/7-11/9/23  Hospital Course  Patient is a 68 y.o. female presented with chest pain with an HPI noted above.  Serial troponins were assessed and found to be 11, 11, 8 with a lipase within normal limits at 26.  WBCs trended up from 11.20 on presentation to 12.70 on the morning following with an increased absolute neutrophil count noted on differential.  UA showed elevated specific gravity of 1.035 but was otherwise unremarkable.  CTA of chest showed no acute abnormality specifically no evidence of acute pulmonary embolism or abnormality of the thoracic aorta though a fine weblike defect in the right mainstem bronchus noted is likely related to respiratory secretions was present.  She is given her home blood pressure medication as well as IV  morphine and Tridil infusion with improvement in blood pressure and pain reported.  Cardiology was consulted who evaluated patient who ordered stress test with resting images obtained on 11/8/2023 and test completed on 11/29/2023 which was reported with uniform radionucleotide uptake although some images were reported with reversibility per cardiologist who notes normal left ventricular size and contractility with an EF of 85% and recommends outpatient follow-up in 2 weeks.  Echocardiogram was also obtained.  At this time patient is felt to be in good condition for discharge with close follow-up with her PCP as well as cardiology on an outpatient basis.  Her full testing/results and plan were discussed with patient along with concerning/alarm symptoms for which to call 911/return to the ED. Mucinex and Tessalon will be ordered at discharge.  All questions were answered and she verbalizes her understanding and agreement.     Her next scheduled appt with cardiology is 3/21/24    The following portions of the patient's history were reviewed and updated as appropriate: allergies, current medications, past family history, past medical history, past social history, past surgical history, and problem list.    Review of Systems   Constitutional: Negative.  Negative for appetite change, fatigue and fever.   Respiratory:  Negative for cough.         Has been using the tessalon for the cough and tells me that her cough is much better with using those  She is about to finish the mucinex  She endorses that the cough was dry and all day  long    She tells me that most of the cough was resolved at the hospital, within a week the cough was mostly gone   Cardiovascular: Negative.  Negative for chest pain.   Gastrointestinal: Negative.         Denies any heartburn or relfux   Psychiatric/Behavioral:  Negative for sleep disturbance.      Objective   Physical Exam  Vitals reviewed.   Constitutional:       Appearance: Normal appearance.    Cardiovascular:      Rate and Rhythm: Normal rate and regular rhythm.      Pulses: Normal pulses.      Heart sounds: Normal heart sounds.   Pulmonary:      Effort: Pulmonary effort is normal.      Breath sounds: Normal breath sounds.   Musculoskeletal:      Right lower leg: No edema.      Left lower leg: No edema.   Skin:     General: Skin is warm.   Neurological:      Mental Status: She is alert and oriented to person, place, and time.   Psychiatric:         Mood and Affect: Mood normal.         Behavior: Behavior normal.         Assessment & Plan   Diagnoses and all orders for this visit:    1. Hospital discharge follow-up (Primary)    2. Refractory angina pectoris    3. Cough, unspecified type  -     benzonatate (Tessalon Perles) 100 MG capsule; Take 1 capsule by mouth 3 (Three) Times a Day As Needed for Cough.  Dispense: 30 capsule; Refill: 1    4. Abdominal bloating  -     Probiotic Product (Probiotic Blend) capsule; Take 1 capsule by mouth Daily.  Dispense: 90 capsule; Refill: 3

## 2023-11-29 ENCOUNTER — TELEPHONE (OUTPATIENT)
Dept: FAMILY MEDICINE CLINIC | Facility: CLINIC | Age: 68
End: 2023-11-29
Payer: MEDICARE

## 2023-11-29 NOTE — TELEPHONE ENCOUNTER
Caller: Scarlet Gong    Relationship: Self    Best call back number: [phone]     What medication are you requesting: NA    What are your current symptoms: LEG PAIN AT NIGHT/WORSENING-OTC MEDICATION IS NOT HELPING AND PATIENT CAN NOT SLEEP    How long have you been experiencing symptoms: 1 YEAR    Have you had these symptoms before:    [x] Yes  [] No    Have you been treated for these symptoms before:   [x] Yes  [] No    If a prescription is needed, what is your preferred pharmacy and phone number: [facility] - [place] -[id number] [place] - [address] [phone] [facility] [phone]      Additional notes:PLEASE CALL AND ADVISE

## 2023-11-30 ENCOUNTER — OFFICE VISIT (OUTPATIENT)
Dept: FAMILY MEDICINE CLINIC | Facility: CLINIC | Age: 68
End: 2023-11-30
Payer: MEDICARE

## 2023-11-30 ENCOUNTER — LAB (OUTPATIENT)
Dept: FAMILY MEDICINE CLINIC | Facility: CLINIC | Age: 68
End: 2023-11-30
Payer: MEDICARE

## 2023-11-30 VITALS
HEART RATE: 67 BPM | DIASTOLIC BLOOD PRESSURE: 80 MMHG | WEIGHT: 221 LBS | BODY MASS INDEX: 40.67 KG/M2 | SYSTOLIC BLOOD PRESSURE: 130 MMHG | OXYGEN SATURATION: 97 % | RESPIRATION RATE: 20 BRPM | HEIGHT: 62 IN

## 2023-11-30 DIAGNOSIS — M54.50 MIDLINE LOW BACK PAIN, UNSPECIFIED CHRONICITY, UNSPECIFIED WHETHER SCIATICA PRESENT: ICD-10-CM

## 2023-11-30 DIAGNOSIS — R73.9 BLOOD GLUCOSE ELEVATED: ICD-10-CM

## 2023-11-30 DIAGNOSIS — M79.605 LEG PAIN, BILATERAL: ICD-10-CM

## 2023-11-30 DIAGNOSIS — M25.551 BILATERAL HIP PAIN: ICD-10-CM

## 2023-11-30 DIAGNOSIS — M79.605 LEG PAIN, BILATERAL: Primary | ICD-10-CM

## 2023-11-30 DIAGNOSIS — M79.604 LEG PAIN, BILATERAL: ICD-10-CM

## 2023-11-30 DIAGNOSIS — M25.552 BILATERAL HIP PAIN: ICD-10-CM

## 2023-11-30 DIAGNOSIS — M79.604 LEG PAIN, BILATERAL: Primary | ICD-10-CM

## 2023-11-30 PROCEDURE — 80048 BASIC METABOLIC PNL TOTAL CA: CPT | Performed by: NURSE PRACTITIONER

## 2023-11-30 PROCEDURE — 85025 COMPLETE CBC W/AUTO DIFF WBC: CPT | Performed by: NURSE PRACTITIONER

## 2023-11-30 PROCEDURE — 99214 OFFICE O/P EST MOD 30 MIN: CPT | Performed by: NURSE PRACTITIONER

## 2023-11-30 PROCEDURE — 1159F MED LIST DOCD IN RCRD: CPT | Performed by: NURSE PRACTITIONER

## 2023-11-30 PROCEDURE — 3075F SYST BP GE 130 - 139MM HG: CPT | Performed by: NURSE PRACTITIONER

## 2023-11-30 PROCEDURE — 3079F DIAST BP 80-89 MM HG: CPT | Performed by: NURSE PRACTITIONER

## 2023-11-30 PROCEDURE — 1160F RVW MEDS BY RX/DR IN RCRD: CPT | Performed by: NURSE PRACTITIONER

## 2023-11-30 PROCEDURE — 83036 HEMOGLOBIN GLYCOSYLATED A1C: CPT | Performed by: NURSE PRACTITIONER

## 2023-11-30 PROCEDURE — 36415 COLL VENOUS BLD VENIPUNCTURE: CPT

## 2023-11-30 RX ORDER — NICOTINE 14MG/24HR
1 PATCH, TRANSDERMAL 24 HOURS TRANSDERMAL DAILY
COMMUNITY
Start: 2023-11-17

## 2023-11-30 RX ORDER — GABAPENTIN 100 MG/1
100 CAPSULE ORAL NIGHTLY
Qty: 30 CAPSULE | Refills: 0 | Status: SHIPPED | OUTPATIENT
Start: 2023-11-30

## 2023-11-30 NOTE — PROGRESS NOTES
"Chief Complaint  Leg Pain    Subjective          Scarlet Gong presents to [facility]  History of Present Illness    Is here today with c/o continues to have leg pain at night  Has tried multiple otc preps without relief    Has h/o hld, htn, obesity, osteopenia, asthma, sleep apnea    Her current medicines are albuterol, asa, os-alonzo, vitamin d3, iron, hydralazine, hctz, hydroxyzine, metoprolol, potassium, probiotic, zinc  She describes her pain as aching in her legs, when she has the leg pain she has to get up and move around, this is true whether daytime or night time    She does endorses an intermittent low back pain and has had some sciatica in the past, she tells me that this does not feel like that     On last/recent labs her blood sugar was elevated and her potassium and sodium were low as noted below, will recheck today as well as get an A1C    Review of Systems   Constitutional:  Positive for fatigue. Negative for appetite change, chills and fever.   Respiratory: Negative.  Negative for shortness of breath.    Cardiovascular: Negative.  Negative for chest pain.   Musculoskeletal:  Positive for myalgias.   Psychiatric/Behavioral:  Positive for sleep disturbance.      Objective   Vital Signs:  /80   Pulse 67   Resp 20   Ht 157.5 cm (62.01\")   Wt 100 kg (221 lb)   SpO2 97%   BMI 40.41 kg/m²     BP Readings from Last 3 Encounters:   11/30/23 130/80   11/17/23 128/74   11/09/23 134/58        Wt Readings from Last 3 Encounters:   11/30/23 100 kg (221 lb)   11/17/23 101 kg (223 lb 3.2 oz)   11/09/23 110 kg (242 lb)              Physical Exam  Vitals reviewed.   Constitutional:       Appearance: Normal appearance.   Cardiovascular:      Rate and Rhythm: Normal rate and regular rhythm.      Pulses: Normal pulses.      Heart sounds: Normal heart sounds.   Pulmonary:      Effort: Pulmonary effort is normal.      Breath sounds: Normal breath sounds.   Musculoskeletal:    "      General: Normal range of motion.      Right lower leg: No edema.      Left lower leg: No edema.   Skin:     General: Skin is warm.   Neurological:      Mental Status: She is alert and oriented to person, place, and time.        Result Review :     CMP          11/7/2023    15:38 11/7/2023    15:48 11/8/2023    03:20 11/9/2023    03:33   CMP   Glucose 124   122  143    BUN 17   16  16    Creatinine 0.75  0.70  0.56  0.67    EGFR 87.4  94.9  100.2  95.9    Sodium 139   138  133    Potassium 4.1   4.0  3.2    Chloride 99   99  94    Calcium 10.4   9.7  10.1    Total Protein 7.6    7.3    Albumin 4.3    4.1    Globulin 3.3    3.2    Total Bilirubin 0.3    0.5    Alkaline Phosphatase 86    81    AST (SGOT) 23    16    ALT (SGPT) 25    18    Albumin/Globulin Ratio 1.3    1.3    BUN/Creatinine Ratio 22.7   28.6  23.9    Anion Gap 12.0   12.0  10.0                Assessment and Plan    Diagnoses and all orders for this visit:    1. Leg pain, bilateral (Primary)  -     gabapentin (NEURONTIN) 100 MG capsule; Take 1 capsule by mouth Every Night.  Dispense: 30 capsule; Refill: 0  -     CBC w AUTO Differential; Future    2. Blood glucose elevated  -     Hemoglobin A1c; Future  -     Basic metabolic panel; Future    3. Bilateral hip pain  -     XR Hip With or Without Pelvis 2 - 3 View Right; Future    4. Midline low back pain, unspecified chronicity, unspecified whether sciatica present  -     XR Spine Lumbar Complete With Flex & Ext; Future           Follow Up   Return in about 2 weeks (around 12/14/2023) for Recheck leg pain.  Patient was given instructions and counseling regarding her condition or for health maintenance advice. Please see specific information pulled into the AVS if appropriate.

## 2023-12-01 ENCOUNTER — TELEPHONE (OUTPATIENT)
Dept: FAMILY MEDICINE CLINIC | Facility: CLINIC | Age: 68
End: 2023-12-01
Payer: MEDICARE

## 2023-12-01 LAB
ANION GAP SERPL CALCULATED.3IONS-SCNC: 10 MMOL/L (ref 5–15)
BASOPHILS # BLD AUTO: 0.03 10*3/MM3 (ref 0–0.2)
BASOPHILS NFR BLD AUTO: 0.4 % (ref 0–1.5)
BUN SERPL-MCNC: 16 MG/DL (ref 8–23)
BUN/CREAT SERPL: 21.9 (ref 7–25)
CALCIUM SPEC-SCNC: 9.6 MG/DL (ref 8.6–10.5)
CHLORIDE SERPL-SCNC: 102 MMOL/L (ref 98–107)
CO2 SERPL-SCNC: 29 MMOL/L (ref 22–29)
CREAT SERPL-MCNC: 0.73 MG/DL (ref 0.57–1)
DEPRECATED RDW RBC AUTO: 41.6 FL (ref 37–54)
EGFRCR SERPLBLD CKD-EPI 2021: 90.3 ML/MIN/1.73
EOSINOPHIL # BLD AUTO: 0.22 10*3/MM3 (ref 0–0.4)
EOSINOPHIL NFR BLD AUTO: 2.7 % (ref 0.3–6.2)
ERYTHROCYTE [DISTWIDTH] IN BLOOD BY AUTOMATED COUNT: 13.5 % (ref 12.3–15.4)
GLUCOSE SERPL-MCNC: 113 MG/DL (ref 65–99)
HBA1C MFR BLD: 6.4 % (ref 4.8–5.6)
HCT VFR BLD AUTO: 33.9 % (ref 34–46.6)
HGB BLD-MCNC: 11.5 G/DL (ref 12–15.9)
IMM GRANULOCYTES # BLD AUTO: 0.02 10*3/MM3 (ref 0–0.05)
IMM GRANULOCYTES NFR BLD AUTO: 0.2 % (ref 0–0.5)
LYMPHOCYTES # BLD AUTO: 3.19 10*3/MM3 (ref 0.7–3.1)
LYMPHOCYTES NFR BLD AUTO: 39.1 % (ref 19.6–45.3)
MCH RBC QN AUTO: 28.8 PG (ref 26.6–33)
MCHC RBC AUTO-ENTMCNC: 33.9 G/DL (ref 31.5–35.7)
MCV RBC AUTO: 84.8 FL (ref 79–97)
MONOCYTES # BLD AUTO: 0.67 10*3/MM3 (ref 0.1–0.9)
MONOCYTES NFR BLD AUTO: 8.2 % (ref 5–12)
NEUTROPHILS NFR BLD AUTO: 4.03 10*3/MM3 (ref 1.7–7)
NEUTROPHILS NFR BLD AUTO: 49.4 % (ref 42.7–76)
NRBC BLD AUTO-RTO: 0 /100 WBC (ref 0–0.2)
PLATELET # BLD AUTO: 385 10*3/MM3 (ref 140–450)
PMV BLD AUTO: 9.5 FL (ref 6–12)
POTASSIUM SERPL-SCNC: 3.9 MMOL/L (ref 3.5–5.2)
RBC # BLD AUTO: 4 10*6/MM3 (ref 3.77–5.28)
SODIUM SERPL-SCNC: 141 MMOL/L (ref 136–145)
WBC NRBC COR # BLD AUTO: 8.16 10*3/MM3 (ref 3.4–10.8)

## 2023-12-01 NOTE — TELEPHONE ENCOUNTER
----- Message from ANGELINA Gresham sent at 12/1/2023  2:09 PM EST -----  Please call with results - thank you    The hip x rays show mild arthritis, right greater than left, and the low back x rays show degenerative joint disease (arthritis) in the low back as well.  Continue with plan for physical therapy, if symptoms persist we can make a referral to for her to be evaluated by an orthopedist.

## 2023-12-01 NOTE — TELEPHONE ENCOUNTER
HUB TO READ    The hip x rays show mild arthritis, right greater than left, and the low back x rays show degenerative joint disease (arthritis) in the low back as well.  Continue with plan for physical therapy, if symptoms persist we can make a referral to for her to be evaluated by an orthopedist.

## 2023-12-01 NOTE — TELEPHONE ENCOUNTER
----- Message from ANGELINA Gresham sent at 12/1/2023  4:39 PM EST -----  Please call with results - thank you  Hemoglobin A1C is up to 6.4, diabetes is diagnosed at 6.5.  On the metabolic panel, her blood sugar was elevated at 113, the electrolytes and kidney function labs were normal, I would encourage her to work on diet to limit sweets, soda, pastas, breads, and processed foods, and also increase physical activity.  We will discuss this further at her next appointment.  On the blood counts her hemoglobin and hematocrit are both slightly below normal, but is otherwise fine.  We will recheck this when she return for her next appointment.

## 2023-12-04 ENCOUNTER — TELEPHONE (OUTPATIENT)
Dept: FAMILY MEDICINE CLINIC | Facility: CLINIC | Age: 68
End: 2023-12-04
Payer: MEDICARE

## 2023-12-04 NOTE — TELEPHONE ENCOUNTER
Caller: [place] Scarlet ANNIE    Relationship: Self    Best call back number:[phone]     Caller requesting test results:      What test was performed: LABS    When was the test performed: 11/30/23    Where was the test performed:      Additional notes:  PATIENT CALLED AND WANTS TO KNOW THE LAB RESULTS THAT WAS DONE ON 11/30/23.        Caller: Scarlet Gong    Relationship: Self    Best call back number:  [phone]     What is the best time to reach you: ANY    Who are you requesting to speak with (clinical staff, provider,  specific staff member):  CLINICAL STAFF    Do you know the name of the person who called:      What was the call regarding: PATIENT CALLED STATED THE MEDICATION   gabapentin (NEURONTIN) 100 MG capsule WILL CAUSE WEIGHT GAIN AND NOT GOING TO TAKE THIS.  IS THERE SOMETHING ELSE SHE CAN PRESCRIBE.      Is it okay if the provider responds through MyChart:

## 2023-12-04 NOTE — TELEPHONE ENCOUNTER
HUB TO READ    Please call with results - thank you   Hemoglobin A1C is up to 6.4, diabetes is diagnosed at 6.5.  On the metabolic panel, her blood sugar was elevated at 113, the electrolytes and kidney function labs were normal, I would encourage her to work on diet to limit sweets, soda, pastas, breads, and processed foods, and also increase physical activity.  We will discuss this further at her next appointment.   On the blood counts her hemoglobin and hematocrit are both slightly below normal, but is otherwise fine.  We will recheck this when she return for her next appointment.

## 2024-02-03 ENCOUNTER — APPOINTMENT (OUTPATIENT)
Dept: ULTRASOUND IMAGING | Facility: HOSPITAL | Age: 69
DRG: 418 | End: 2024-02-03
Payer: MEDICARE

## 2024-02-03 ENCOUNTER — APPOINTMENT (OUTPATIENT)
Dept: CT IMAGING | Facility: HOSPITAL | Age: 69
DRG: 418 | End: 2024-02-03
Payer: MEDICARE

## 2024-02-03 ENCOUNTER — HOSPITAL ENCOUNTER (INPATIENT)
Facility: HOSPITAL | Age: 69
LOS: 1 days | Discharge: HOME | DRG: 418 | End: 2024-02-07
Attending: EMERGENCY MEDICINE | Admitting: INTERNAL MEDICINE
Payer: MEDICARE

## 2024-02-03 DIAGNOSIS — K85.90 PANCREATITIS: ICD-10-CM

## 2024-02-03 DIAGNOSIS — K83.8 DILATION OF BILIARY TRACT: ICD-10-CM

## 2024-02-03 DIAGNOSIS — Z90.49 STATUS POST LAPAROSCOPIC CHOLECYSTECTOMY: ICD-10-CM

## 2024-02-03 DIAGNOSIS — K85.90 ACUTE PANCREATITIS, UNSPECIFIED COMPLICATION STATUS, UNSPECIFIED PANCREATITIS TYPE: Primary | ICD-10-CM

## 2024-02-03 DIAGNOSIS — L29.9 PRURITUS: ICD-10-CM

## 2024-02-03 DIAGNOSIS — R79.89 ELEVATED LFTS: ICD-10-CM

## 2024-02-03 DIAGNOSIS — K80.20 CHOLELITHIASIS: ICD-10-CM

## 2024-02-03 LAB
ALBUMIN SERPL-MCNC: 4.4 G/DL (ref 3.5–5.2)
ALBUMIN/GLOB SERPL: 1.2 G/DL
ALP SERPL-CCNC: 699 U/L (ref 39–117)
ALT SERPL W P-5'-P-CCNC: 798 U/L (ref 1–33)
ANION GAP SERPL CALCULATED.3IONS-SCNC: 15 MMOL/L (ref 5–15)
AST SERPL-CCNC: 415 U/L (ref 1–32)
BASOPHILS # BLD AUTO: 0.1 10*3/MM3 (ref 0–0.2)
BASOPHILS NFR BLD AUTO: 1 % (ref 0–1.5)
BILIRUB SERPL-MCNC: 1.5 MG/DL (ref 0–1.2)
BUN SERPL-MCNC: 18 MG/DL (ref 8–23)
BUN/CREAT SERPL: 26.9 (ref 7–25)
CALCIUM SPEC-SCNC: 10.5 MG/DL (ref 8.6–10.5)
CHLORIDE SERPL-SCNC: 100 MMOL/L (ref 98–107)
CO2 SERPL-SCNC: 25 MMOL/L (ref 22–29)
CREAT SERPL-MCNC: 0.67 MG/DL (ref 0.57–1)
DEPRECATED RDW RBC AUTO: 48.6 FL (ref 37–54)
EGFRCR SERPLBLD CKD-EPI 2021: 95.9 ML/MIN/1.73
EOSINOPHIL # BLD AUTO: 0.1 10*3/MM3 (ref 0–0.4)
EOSINOPHIL NFR BLD AUTO: 0.9 % (ref 0.3–6.2)
ERYTHROCYTE [DISTWIDTH] IN BLOOD BY AUTOMATED COUNT: 16.2 % (ref 12.3–15.4)
GLOBULIN UR ELPH-MCNC: 3.6 GM/DL
GLUCOSE SERPL-MCNC: 98 MG/DL (ref 65–99)
HCT VFR BLD AUTO: 39.9 % (ref 34–46.6)
HGB BLD-MCNC: 13.1 G/DL (ref 12–15.9)
LIPASE SERPL-CCNC: 1065 U/L (ref 13–60)
LYMPHOCYTES # BLD AUTO: 2.5 10*3/MM3 (ref 0.7–3.1)
LYMPHOCYTES NFR BLD AUTO: 25 % (ref 19.6–45.3)
MCH RBC QN AUTO: 28 PG (ref 26.6–33)
MCHC RBC AUTO-ENTMCNC: 32.7 G/DL (ref 31.5–35.7)
MCV RBC AUTO: 85.6 FL (ref 79–97)
MONOCYTES # BLD AUTO: 0.7 10*3/MM3 (ref 0.1–0.9)
MONOCYTES NFR BLD AUTO: 6.6 % (ref 5–12)
NEUTROPHILS NFR BLD AUTO: 6.7 10*3/MM3 (ref 1.7–7)
NEUTROPHILS NFR BLD AUTO: 66.5 % (ref 42.7–76)
NRBC BLD AUTO-RTO: 0 /100 WBC (ref 0–0.2)
PLATELET # BLD AUTO: 422 10*3/MM3 (ref 140–450)
PMV BLD AUTO: 7.3 FL (ref 6–12)
POTASSIUM SERPL-SCNC: 3.5 MMOL/L (ref 3.5–5.2)
PROT SERPL-MCNC: 8 G/DL (ref 6–8.5)
RBC # BLD AUTO: 4.66 10*6/MM3 (ref 3.77–5.28)
SODIUM SERPL-SCNC: 140 MMOL/L (ref 136–145)
WBC NRBC COR # BLD AUTO: 10.1 10*3/MM3 (ref 3.4–10.8)

## 2024-02-03 PROCEDURE — G0378 HOSPITAL OBSERVATION PER HR: HCPCS

## 2024-02-03 PROCEDURE — 25510000001 IOPAMIDOL PER 1 ML: Performed by: EMERGENCY MEDICINE

## 2024-02-03 PROCEDURE — 76705 ECHO EXAM OF ABDOMEN: CPT

## 2024-02-03 PROCEDURE — 83690 ASSAY OF LIPASE: CPT | Performed by: PHYSICIAN ASSISTANT

## 2024-02-03 PROCEDURE — 74177 CT ABD & PELVIS W/CONTRAST: CPT

## 2024-02-03 PROCEDURE — 85025 COMPLETE CBC W/AUTO DIFF WBC: CPT | Performed by: PHYSICIAN ASSISTANT

## 2024-02-03 PROCEDURE — 25010000002 DIPHENHYDRAMINE PER 50 MG: Performed by: PHYSICIAN ASSISTANT

## 2024-02-03 PROCEDURE — 99285 EMERGENCY DEPT VISIT HI MDM: CPT

## 2024-02-03 PROCEDURE — 80053 COMPREHEN METABOLIC PANEL: CPT | Performed by: PHYSICIAN ASSISTANT

## 2024-02-03 RX ORDER — POLYETHYLENE GLYCOL 3350 17 G/17G
17 POWDER, FOR SOLUTION ORAL DAILY PRN
Status: DISCONTINUED | OUTPATIENT
Start: 2024-02-03 | End: 2024-02-04

## 2024-02-03 RX ORDER — ONDANSETRON 2 MG/ML
4 INJECTION INTRAMUSCULAR; INTRAVENOUS EVERY 6 HOURS PRN
Status: DISCONTINUED | OUTPATIENT
Start: 2024-02-03 | End: 2024-02-07 | Stop reason: HOSPADM

## 2024-02-03 RX ORDER — URSODIOL 500 MG/1
500 TABLET, FILM COATED ORAL EVERY 12 HOURS SCHEDULED
Status: DISCONTINUED | OUTPATIENT
Start: 2024-02-03 | End: 2024-02-07 | Stop reason: HOSPADM

## 2024-02-03 RX ORDER — GABAPENTIN 100 MG/1
100 CAPSULE ORAL NIGHTLY
Status: DISCONTINUED | OUTPATIENT
Start: 2024-02-03 | End: 2024-02-07 | Stop reason: HOSPADM

## 2024-02-03 RX ORDER — HYDROXYZINE HYDROCHLORIDE 25 MG/1
25 TABLET, FILM COATED ORAL NIGHTLY PRN
Status: DISCONTINUED | OUTPATIENT
Start: 2024-02-03 | End: 2024-02-03 | Stop reason: SDUPTHER

## 2024-02-03 RX ORDER — SODIUM CHLORIDE 0.9 % (FLUSH) 0.9 %
10 SYRINGE (ML) INJECTION AS NEEDED
Status: DISCONTINUED | OUTPATIENT
Start: 2024-02-03 | End: 2024-02-07 | Stop reason: HOSPADM

## 2024-02-03 RX ORDER — TRIAMCINOLONE ACETONIDE 1 MG/G
1 CREAM TOPICAL 2 TIMES DAILY
COMMUNITY

## 2024-02-03 RX ORDER — AMOXICILLIN 250 MG
2 CAPSULE ORAL 2 TIMES DAILY
Status: DISCONTINUED | OUTPATIENT
Start: 2024-02-03 | End: 2024-02-07 | Stop reason: HOSPADM

## 2024-02-03 RX ORDER — CHOLECALCIFEROL (VITAMIN D3) 125 MCG
5 CAPSULE ORAL NIGHTLY PRN
Status: DISCONTINUED | OUTPATIENT
Start: 2024-02-03 | End: 2024-02-07 | Stop reason: HOSPADM

## 2024-02-03 RX ORDER — POTASSIUM CHLORIDE 20 MEQ/1
20 TABLET, EXTENDED RELEASE ORAL ONCE
Status: COMPLETED | OUTPATIENT
Start: 2024-02-03 | End: 2024-02-03

## 2024-02-03 RX ORDER — HYDROXYZINE HYDROCHLORIDE 25 MG/1
25 TABLET, FILM COATED ORAL NIGHTLY PRN
COMMUNITY

## 2024-02-03 RX ORDER — HYDROXYZINE HYDROCHLORIDE 25 MG/1
25 TABLET, FILM COATED ORAL EVERY 6 HOURS PRN
Status: DISCONTINUED | OUTPATIENT
Start: 2024-02-03 | End: 2024-02-07 | Stop reason: HOSPADM

## 2024-02-03 RX ORDER — BENZONATATE 100 MG/1
100 CAPSULE ORAL 3 TIMES DAILY PRN
Status: DISCONTINUED | OUTPATIENT
Start: 2024-02-03 | End: 2024-02-07 | Stop reason: HOSPADM

## 2024-02-03 RX ORDER — BISACODYL 5 MG/1
5 TABLET, DELAYED RELEASE ORAL DAILY PRN
Status: DISCONTINUED | OUTPATIENT
Start: 2024-02-03 | End: 2024-02-07 | Stop reason: HOSPADM

## 2024-02-03 RX ORDER — DIPHENHYDRAMINE HYDROCHLORIDE 50 MG/ML
25 INJECTION INTRAMUSCULAR; INTRAVENOUS ONCE
Status: COMPLETED | OUTPATIENT
Start: 2024-02-03 | End: 2024-02-03

## 2024-02-03 RX ORDER — FAMOTIDINE 10 MG/ML
20 INJECTION, SOLUTION INTRAVENOUS ONCE
Status: COMPLETED | OUTPATIENT
Start: 2024-02-03 | End: 2024-02-03

## 2024-02-03 RX ORDER — BISACODYL 10 MG
10 SUPPOSITORY, RECTAL RECTAL DAILY PRN
Status: DISCONTINUED | OUTPATIENT
Start: 2024-02-03 | End: 2024-02-07 | Stop reason: HOSPADM

## 2024-02-03 RX ORDER — FERROUS SULFATE 324(65)MG
324 TABLET, DELAYED RELEASE (ENTERIC COATED) ORAL
Status: DISCONTINUED | OUTPATIENT
Start: 2024-02-04 | End: 2024-02-07 | Stop reason: HOSPADM

## 2024-02-03 RX ORDER — SODIUM CHLORIDE 0.9 % (FLUSH) 0.9 %
10 SYRINGE (ML) INJECTION EVERY 12 HOURS SCHEDULED
Status: DISCONTINUED | OUTPATIENT
Start: 2024-02-03 | End: 2024-02-07 | Stop reason: HOSPADM

## 2024-02-03 RX ORDER — HYDROCHLOROTHIAZIDE 25 MG/1
50 TABLET ORAL DAILY
Status: DISCONTINUED | OUTPATIENT
Start: 2024-02-04 | End: 2024-02-07 | Stop reason: HOSPADM

## 2024-02-03 RX ORDER — ACETAMINOPHEN 325 MG/1
650 TABLET ORAL EVERY 4 HOURS PRN
Status: DISCONTINUED | OUTPATIENT
Start: 2024-02-03 | End: 2024-02-07 | Stop reason: HOSPADM

## 2024-02-03 RX ORDER — ROPINIROLE 0.5 MG/1
1 TABLET, FILM COATED ORAL NIGHTLY
COMMUNITY

## 2024-02-03 RX ORDER — ALBUTEROL SULFATE 2.5 MG/3ML
2.5 SOLUTION RESPIRATORY (INHALATION) EVERY 6 HOURS PRN
Status: DISCONTINUED | OUTPATIENT
Start: 2024-02-03 | End: 2024-02-07 | Stop reason: HOSPADM

## 2024-02-03 RX ORDER — SODIUM CHLORIDE 9 MG/ML
40 INJECTION, SOLUTION INTRAVENOUS AS NEEDED
Status: DISCONTINUED | OUTPATIENT
Start: 2024-02-03 | End: 2024-02-07 | Stop reason: HOSPADM

## 2024-02-03 RX ORDER — HYDRALAZINE HYDROCHLORIDE 25 MG/1
75 TABLET, FILM COATED ORAL 2 TIMES DAILY
Status: DISCONTINUED | OUTPATIENT
Start: 2024-02-04 | End: 2024-02-07 | Stop reason: HOSPADM

## 2024-02-03 RX ADMIN — POTASSIUM CHLORIDE 20 MEQ: 1500 TABLET, EXTENDED RELEASE ORAL at 22:51

## 2024-02-03 RX ADMIN — HYDROXYZINE HYDROCHLORIDE 25 MG: 25 TABLET, FILM COATED ORAL at 17:47

## 2024-02-03 RX ADMIN — GABAPENTIN 100 MG: 100 CAPSULE ORAL at 22:51

## 2024-02-03 RX ADMIN — URSODIOL 500 MG: 500 TABLET, FILM COATED ORAL at 20:47

## 2024-02-03 RX ADMIN — IOPAMIDOL 100 ML: 755 INJECTION, SOLUTION INTRAVENOUS at 16:44

## 2024-02-03 RX ADMIN — FAMOTIDINE 20 MG: 10 INJECTION INTRAVENOUS at 13:45

## 2024-02-03 RX ADMIN — Medication 5 MG: at 22:51

## 2024-02-03 RX ADMIN — Medication 10 ML: at 22:51

## 2024-02-03 RX ADMIN — DIPHENHYDRAMINE HYDROCHLORIDE 25 MG: 50 INJECTION, SOLUTION INTRAMUSCULAR; INTRAVENOUS at 13:45

## 2024-02-03 NOTE — ED PROVIDER NOTES
"Subjective   History of Present Illness  Chief Complaint: Itching    Patient is a 68-year-old female history of hyperlipidemia hypertension asthma presents to the ER with complaints of itching all over.  Patient states that she has been itching all over since January.  She states that she has been seen by a dermatologist and was started on a triamcinolone cream.  She states it is not helping.  Patient presents today complaining of persistent and severe itching that she cannot tolerate.  There is no rash hives or urticaria.  She reports no new soaps, laundry detergents lotions perfumes or medications.  No new antibiotics.  Patient reports that she had some severe epigastric abdominal pain yesterday but not today.  No nausea or vomiting.  No discoloration of her skin.  No nausea vomiting no history of liver disease.    PCP: Ruben Mejias    History provided by:  Patient      Review of Systems   Constitutional:  Negative for fever.   HENT:  Negative for sore throat and trouble swallowing.    Eyes: Negative.    Respiratory:  Negative for shortness of breath and wheezing.    Cardiovascular:  Negative for chest pain.   Gastrointestinal:  Negative for abdominal pain, diarrhea, nausea and vomiting.   Endocrine: Negative.    Genitourinary:  Negative for dysuria.   Musculoskeletal:  Negative for myalgias.   Skin:  Negative for rash.        Itching   Allergic/Immunologic: Negative.    Neurological:  Negative for headaches.   Psychiatric/Behavioral:  Negative for behavioral problems.    All other systems reviewed and are negative.      Past Medical History:   Diagnosis Date    Asthma 04/27/2016    Hyperlipidemia 03/02/2020    Hypertension 03/02/2020    Obesity (BMI 30-39.9) 03/02/2020    Osteopenia 03/02/2020       Allergies   Allergen Reactions    Prednisone Rash    Atorvastatin Itching    Lisinopril Cough    Azithromycin Rash     Re-entered from allergy \"Z-Pack\"       Past Surgical History:   Procedure Laterality Date    " CARDIAC CATHETERIZATION N/A 03/04/2020    Procedure: Left Heart Cath and coronary angiogram;  Surgeon: Hien Camacho MD;  Location: [facility] CATH INVASIVE LOCATION;  Service: Cardiovascular;  Laterality: N/A;    CARDIAC CATHETERIZATION N/A 03/04/2020    Procedure: Left ventriculography;  Surgeon: Hien Camacho MD;  Location: [facility] CATH INVASIVE LOCATION;  Service: Cardiovascular;  Laterality: N/A;    CARDIAC CATHETERIZATION N/A 1/30/2023    Procedure: Left Heart Cath and coronary angiogram;  Surgeon: Hien Camacho MD;  Location: [facility] CATH INVASIVE LOCATION;  Service: Cardiovascular;  Laterality: N/A;    CARDIAC CATHETERIZATION N/A 1/30/2023    Procedure: Right Heart Cath;  Surgeon: Hien Camacho MD;  Location: [facility] CATH INVASIVE LOCATION;  Service: Cardiovascular;  Laterality: N/A;       Family History   Problem Relation Age of Onset    Hypertension Mother     Heart disease Sister     Heart disease Brother     Colon cancer Maternal Grandmother        Social History     Socioeconomic History    Marital status:    Tobacco Use    Smoking status: Former     Packs/day: 1.50     Years: 32.00     Additional pack years: 0.00     Total pack years: 48.00     Types: Cigarettes     Passive exposure: Past    Smokeless tobacco: Never   Vaping Use    Vaping Use: Never used   Substance and Sexual Activity    Alcohol use: Never    Drug use: Never    Sexual activity: Defer           Objective   Physical Exam  Vitals and nursing note reviewed.   Constitutional:       Appearance: Normal appearance. She is well-developed and normal weight. She is not ill-appearing or toxic-appearing.   HENT:      Head: Normocephalic and atraumatic.   Eyes:      General: No scleral icterus.     Pupils: Pupils are equal, round, and reactive to light.   Cardiovascular:      Rate and Rhythm: Normal rate and regular rhythm.      Pulses: Normal pulses.      Heart sounds: Normal heart sounds. No murmur heard.  Pulmonary:      Effort:  "Pulmonary effort is normal. No respiratory distress.      Breath sounds: Normal breath sounds. No wheezing.   Abdominal:      General: Bowel sounds are normal. There is no distension.      Palpations: Abdomen is soft.      Tenderness: There is no abdominal tenderness.   Skin:     General: Skin is warm and dry.      Capillary Refill: Capillary refill takes less than 2 seconds.      Coloration: Skin is not jaundiced.      Findings: Erythema and rash present.      Comments: Erythematous blanchable rash across chest, arms.  No hives or urticaria  No vesicles bleeding or drainage.   Neurological:      General: No focal deficit present.      Mental Status: She is alert and oriented to person, place, and time.      Motor: No weakness.   Psychiatric:         Mood and Affect: Mood normal.         Behavior: Behavior normal.         Procedures           ED Course  ED Course as of 02/03/24 1848   Sat Feb 03, 2024   1735 I spoke with Dr. Quezada, recommended starting ursodiol twice daily 500 mg as well hydroxyzine 25 every 6 as needed.    Recommend MRCP with contrast and admit to observation for evaluation in the morning. [MC]      ED Course User Index  [MC] Sussy Raphael, PA    /70   Pulse 70   Temp 98.2 °F (36.8 °C) (Oral)   Resp 17   Ht 157.5 cm (62\")   Wt 95.7 kg (210 lb 15.7 oz)   SpO2 98%   BMI 38.59 kg/m²   Labs Reviewed   COMPREHENSIVE METABOLIC PANEL - Abnormal; Notable for the following components:       Result Value    ALT (SGPT) 798 (*)     AST (SGOT) 415 (*)     Alkaline Phosphatase 699 (*)     Total Bilirubin 1.5 (*)     BUN/Creatinine Ratio 26.9 (*)     All other components within normal limits    Narrative:     GFR Normal >60  Chronic Kidney Disease <60  Kidney Failure <15     CBC WITH AUTO DIFFERENTIAL - Abnormal; Notable for the following components:    RDW 16.2 (*)     All other components within normal limits   LIPASE - Abnormal; Notable for the following components:    Lipase 1,065 (*)     All " other components within normal limits   CBC AND DIFFERENTIAL    Narrative:     The following orders were created for panel order CBC & Differential.  Procedure                               Abnormality         Status                     ---------                               -----------         ------                     CBC Auto Differential[[id number]]        Abnormal            Final result                 Please view results for these tests on the individual orders.     Medications   sodium chloride 0.9 % flush 10 mL (has no administration in time range)   ursodiol (ACTIGALL) tablet 500 mg (has no administration in time range)   hydrOXYzine (ATARAX) tablet 25 mg (25 mg Oral Given 2/3/24 1747)   diphenhydrAMINE (BENADRYL) injection 25 mg (25 mg Intravenous Given 2/3/24 1345)   famotidine (PEPCID) injection 20 mg (20 mg Intravenous Given 2/3/24 1345)   iopamidol (ISOVUE-370) 76 % injection 100 mL (100 mL Intravenous Given 2/3/24 1644)     CT Abdomen Pelvis With Contrast    Result Date: 2/3/2024  The gallbladder is not well-distended there is wall thickening and probable pericholecystic inflammation. Correlate for acute cholecystitis. The common bile duct is dilated up to 1.3 cm. No obstructing stones are seen within the bile duct. There are no findings of pancreatitis on CT. Pancreatitis is a clinical diagnosis. No complications of pancreatitis on this exam. Mild hepatomegaly with hepatic steatosis. Moderate stool burden correlate for constipation. Nonobstructing small calculus mid left kidney. Electronically Signed: Milagro Collins MD  2/3/2024 4:56 PM EST  Workstation ID: [id number]    US Liver    Result Date: 2/3/2024  Impression: 1. Cholelithiasis. No evidence of biliary tract obstruction. 2. Hepatic steatosis Electronically Signed: Anthony Tong MD  2/3/2024 4:09 PM EST  Workstation ID: [id number]                                            Medical Decision Making  Differential Dx (Includes but not limited to):  Gallbladder cancer, cholecystitis acute cholangitis, choledocholithiasis, electrolyte abnormality, rash, urticaria  Medical Records Reviewed: Labwork reviewed from 2 months ago, LFTs normal  Labs: On interpretation CBC no leukocytosis.  CMP   alk phos 69 total bili 1.5.  Lipase greater than 1000  Imaging: On my interpretation ultrasound unremarkable.  CT scan shows inflammatory changes surrounding the gallbladder suggestive of acute cholecystitis.  No CT evidence of pancreatitis.  Telemetry: N/A  Testing considered but not ordered: CT head patient denies headache or head injury  Nature of Complaint: Acute  Admission vs Discharge: Admission  Discussion: While in the ED IV was placed and labs were obtained appropriate PPE was worn during exam and throughout all encounters with the patient.  Patient had the above evaluation.  She is afebrile, nontoxic appearance in no acute distress.  She is initially given Pepcid Benadryl and Solu-Medrol for her itching with no improvement.  Lab work obtained significant for elevated LFTs and lipase.  She currently has no abdominal pain no nausea or vomiting.  On imaging questionable possible cholecystitis, no CT evidence of pancreatitis.  There is biliary duct dilation of 1.3 cm.  I spoke with GI provider Dr. Quezada, recommended ursodiol, hydroxyzine and MRCP for further evaluation of biliary duct.  Patient will be placed in ED observation for further testing, treatment and GI consultation in the morning.  Patient is agreeable with this plan.        Amount and/or Complexity of Data Reviewed  External Data Reviewed: labs.  Labs: ordered. Decision-making details documented in ED Course.  Radiology: ordered. Decision-making details documented in ED Course.    Risk  Prescription drug management.  Decision regarding hospitalization.        Final diagnoses:   Acute pancreatitis, unspecified complication status, unspecified pancreatitis type   Elevated LFTs   Dilation of  biliary tract   Pruritus       ED Disposition  ED Disposition       ED Disposition   Decision to Admit    Condition   --    Comment   --               No follow-up provider specified.       Medication List      No changes were made to your prescriptions during this visit.            Sussy Raphael PA  02/03/24 1582

## 2024-02-03 NOTE — ED NOTES
Nursing report ED to floor  Scarlet Gong  68 y.o.  female    HPI:   Chief Complaint   Patient presents with    Itching       Admitting doctor:   Zach Lacy MD    Admitting diagnosis:   There were no encounter diagnoses.    Code status:   Current Code Status       Date Active Code Status Order ID Comments User Context       Prior            Allergies:   Prednisone, Atorvastatin, Lisinopril, and Azithromycin    Isolation:  No active isolations     Fall Risk:  Fall Risk Assessment was completed, and patient is at low risk for falls.   Predictive Model Details         22 (Low) Factor Value    Calculated 2/3/2024 17:59 Age 68    Risk of Fall Model Musculoskeletal Assessment WDL     Active Peripheral IV Present     Imaging order in this encounter Present      U/L     Financial Class Other     Magnesium not on file     Number of Distinct Medication Classes administered 4     Respiratory Rate 17     Drug Use No     Tobacco Use Quit     Diastolic BP 70     Total Bilirubin 1.5 mg/dL     Cash Scale not on file     Peripheral Vascular Assessment WDL     Chloride 100 mmol/L     Number of administrations of Ulcer Drugs 1     Gastrointestinal Assessment WDL     Cardiac Assessment WDL     Days after Admission 0.215     Skin Assessment X     Creatinine 0.67 mg/dL     Calcium 10.5 mg/dL     Albumin 4.4 g/dL     Potassium 3.5 mmol/L         Weight:       02/03/24  1148   Weight: 95.7 kg (210 lb 15.7 oz)       Intake and Output  No intake or output data in the 24 hours ending 02/03/24 1759    Diet:        Most recent vitals:   Vitals:    02/03/24 1148 02/03/24 1353 02/03/24 1606 02/03/24 1751   BP:  130/64 140/61 179/70   BP Location:       Patient Position:       Pulse:  64 67 70   Resp:    17   Temp:       TempSrc:       SpO2:  100% 99% 98%   Weight: 95.7 kg (210 lb 15.7 oz)      Height:           Active LDAs/IV Access:   Lines, Drains & Airways       Active LDAs       Name Placement date Placement time Site Days     Peripheral IV 02/03/24 1338 Right Antecubital 02/03/24  1338  Antecubital  less than 1                    Skin Condition:   Skin Assessments (last day)       Date/Time Skin WDL Skin Integrity    02/03/24 13:54:12 X;characteristics rash             Labs (abnormal labs have a star):   Labs Reviewed   COMPREHENSIVE METABOLIC PANEL - Abnormal; Notable for the following components:       Result Value    ALT (SGPT) 798 (*)     AST (SGOT) 415 (*)     Alkaline Phosphatase 699 (*)     Total Bilirubin 1.5 (*)     BUN/Creatinine Ratio 26.9 (*)     All other components within normal limits    Narrative:     GFR Normal >60  Chronic Kidney Disease <60  Kidney Failure <15     CBC WITH AUTO DIFFERENTIAL - Abnormal; Notable for the following components:    RDW 16.2 (*)     All other components within normal limits   LIPASE - Abnormal; Notable for the following components:    Lipase 1,065 (*)     All other components within normal limits   CBC AND DIFFERENTIAL    Narrative:     The following orders were created for panel order CBC & Differential.  Procedure                               Abnormality         Status                     ---------                               -----------         ------                     CBC Auto Differential[[id number]]        Abnormal            Final result                 Please view results for these tests on the individual orders.       LOC: Person, Place, Time, and Situation    Telemetry:  Observation Unit    Cardiac Monitoring Ordered: yes    EKG:   No orders to display       Medications Given in the ED:   Medications   sodium chloride 0.9 % flush 10 mL (has no administration in time range)   ursodiol (ACTIGALL) tablet 500 mg (has no administration in time range)   hydrOXYzine (ATARAX) tablet 25 mg (25 mg Oral Given 2/3/24 1667)   diphenhydrAMINE (BENADRYL) injection 25 mg (25 mg Intravenous Given 2/3/24 1345)   famotidine (PEPCID) injection 20 mg (20 mg Intravenous Given 2/3/24 1345)   iopamidol  (ISOVUE-370) 76 % injection 100 mL (100 mL Intravenous Given 2/3/24 1734)       Imaging results:  CT Abdomen Pelvis With Contrast    Result Date: 2/3/2024  The gallbladder is not well-distended there is wall thickening and probable pericholecystic inflammation. Correlate for acute cholecystitis. The common bile duct is dilated up to 1.3 cm. No obstructing stones are seen within the bile duct. There are no findings of pancreatitis on CT. Pancreatitis is a clinical diagnosis. No complications of pancreatitis on this exam. Mild hepatomegaly with hepatic steatosis. Moderate stool burden correlate for constipation. Nonobstructing small calculus mid left kidney. Electronically Signed: Milagro Collins MD  2/3/2024 4:56 PM EST  Workstation ID: [id number]     Liver    Result Date: 2/3/2024  Impression: 1. Cholelithiasis. No evidence of biliary tract obstruction. 2. Hepatic steatosis Electronically Signed: Anthony Tong MD  2/3/2024 4:09 PM EST  Workstation ID: [id number]     Social issues:   Social History     Socioeconomic History    Marital status:    Tobacco Use    Smoking status: Former     Packs/day: 1.50     Years: 32.00     Additional pack years: 0.00     Total pack years: 48.00     Types: Cigarettes     Passive exposure: Past    Smokeless tobacco: Never   Vaping Use    Vaping Use: Never used   Substance and Sexual Activity    Alcohol use: Never    Drug use: Never    Sexual activity: Defer       NIH Stroke Scale:  Interval: (not recorded)  1a. Level of Consciousness: (not recorded)  1b. LOC Questions: (not recorded)  1c. LOC Commands: (not recorded)  2. Best Gaze: (not recorded)  3. Visual: (not recorded)  4. Facial Palsy: (not recorded)  5a. Motor Arm, Left: (not recorded)  5b. Motor Arm, Right: (not recorded)  6a. Motor Leg, Left: (not recorded)  6b. Motor Leg, Right: (not recorded)  7. Limb Ataxia: (not recorded)  8. Sensory: (not recorded)  9. Best Language: (not recorded)  10. Dysarthria: (not recorded)  11.  Extinction and Inattention (formerly Neglect): (not recorded)    Total (NIH Stroke Scale): (not recorded)     Additional notable assessment information:     Nursing report ED to floor:  duke Escobar RN   02/03/24 17:59 EST

## 2024-02-04 ENCOUNTER — APPOINTMENT (OUTPATIENT)
Dept: MRI IMAGING | Facility: HOSPITAL | Age: 69
DRG: 418 | End: 2024-02-04
Payer: MEDICARE

## 2024-02-04 ENCOUNTER — ON CAMPUS - OUTPATIENT (OUTPATIENT)
Dept: URBAN - METROPOLITAN AREA HOSPITAL 85 | Facility: HOSPITAL | Age: 69
End: 2024-02-04
Payer: MEDICARE

## 2024-02-04 DIAGNOSIS — K80.20 CALCULUS OF GALLBLADDER WITHOUT CHOLECYSTITIS WITHOUT OBSTRU: ICD-10-CM

## 2024-02-04 DIAGNOSIS — D50.9 IRON DEFICIENCY ANEMIA, UNSPECIFIED: ICD-10-CM

## 2024-02-04 DIAGNOSIS — D72.829 ELEVATED WHITE BLOOD CELL COUNT, UNSPECIFIED: ICD-10-CM

## 2024-02-04 DIAGNOSIS — R93.3 ABNORMAL FINDINGS ON DIAGNOSTIC IMAGING OF OTHER PARTS OF DI: ICD-10-CM

## 2024-02-04 DIAGNOSIS — K59.00 CONSTIPATION, UNSPECIFIED: ICD-10-CM

## 2024-02-04 DIAGNOSIS — K85.90 ACUTE PANCREATITIS WITHOUT NECROSIS OR INFECTION, UNSPECIFIE: ICD-10-CM

## 2024-02-04 DIAGNOSIS — L29.8 OTHER PRURITUS: ICD-10-CM

## 2024-02-04 DIAGNOSIS — R74.01 ELEVATION OF LEVELS OF LIVER TRANSAMINASE LEVELS: ICD-10-CM

## 2024-02-04 LAB
ALBUMIN SERPL-MCNC: 4 G/DL (ref 3.5–5.2)
ALP SERPL-CCNC: 533 U/L (ref 39–117)
ALT SERPL W P-5'-P-CCNC: 501 U/L (ref 1–33)
ANION GAP SERPL CALCULATED.3IONS-SCNC: 12 MMOL/L (ref 5–15)
AST SERPL-CCNC: 150 U/L (ref 1–32)
BASOPHILS # BLD AUTO: 0.1 10*3/MM3 (ref 0–0.2)
BASOPHILS NFR BLD AUTO: 0.9 % (ref 0–1.5)
BILIRUB CONJ SERPL-MCNC: 0.4 MG/DL (ref 0–0.3)
BILIRUB INDIRECT SERPL-MCNC: 0.5 MG/DL
BILIRUB SERPL-MCNC: 0.9 MG/DL (ref 0–1.2)
BUN SERPL-MCNC: 20 MG/DL (ref 8–23)
BUN/CREAT SERPL: 33.9 (ref 7–25)
CALCIUM SPEC-SCNC: 9.3 MG/DL (ref 8.6–10.5)
CHLORIDE SERPL-SCNC: 102 MMOL/L (ref 98–107)
CO2 SERPL-SCNC: 24 MMOL/L (ref 22–29)
CREAT SERPL-MCNC: 0.59 MG/DL (ref 0.57–1)
CRP SERPL-MCNC: 1.81 MG/DL (ref 0–0.5)
DEPRECATED RDW RBC AUTO: 49.9 FL (ref 37–54)
EGFRCR SERPLBLD CKD-EPI 2021: 98.9 ML/MIN/1.73
EOSINOPHIL # BLD AUTO: 0.1 10*3/MM3 (ref 0–0.4)
EOSINOPHIL NFR BLD AUTO: 1.2 % (ref 0.3–6.2)
ERYTHROCYTE [DISTWIDTH] IN BLOOD BY AUTOMATED COUNT: 16.4 % (ref 12.3–15.4)
GLUCOSE SERPL-MCNC: 133 MG/DL (ref 65–99)
HCT VFR BLD AUTO: 37.6 % (ref 34–46.6)
HGB BLD-MCNC: 12.1 G/DL (ref 12–15.9)
LIPASE SERPL-CCNC: 135 U/L (ref 13–60)
LYMPHOCYTES # BLD AUTO: 1.9 10*3/MM3 (ref 0.7–3.1)
LYMPHOCYTES NFR BLD AUTO: 16.7 % (ref 19.6–45.3)
MCH RBC QN AUTO: 27.9 PG (ref 26.6–33)
MCHC RBC AUTO-ENTMCNC: 32.2 G/DL (ref 31.5–35.7)
MCV RBC AUTO: 86.6 FL (ref 79–97)
MONOCYTES # BLD AUTO: 0.7 10*3/MM3 (ref 0.1–0.9)
MONOCYTES NFR BLD AUTO: 6.6 % (ref 5–12)
NEUTROPHILS NFR BLD AUTO: 74.6 % (ref 42.7–76)
NEUTROPHILS NFR BLD AUTO: 8.4 10*3/MM3 (ref 1.7–7)
NRBC BLD AUTO-RTO: 0 /100 WBC (ref 0–0.2)
PLATELET # BLD AUTO: 371 10*3/MM3 (ref 140–450)
PMV BLD AUTO: 8.3 FL (ref 6–12)
POTASSIUM SERPL-SCNC: 3.8 MMOL/L (ref 3.5–5.2)
PROT SERPL-MCNC: 6.8 G/DL (ref 6–8.5)
RBC # BLD AUTO: 4.34 10*6/MM3 (ref 3.77–5.28)
SODIUM SERPL-SCNC: 138 MMOL/L (ref 136–145)
WBC NRBC COR # BLD AUTO: 11.2 10*3/MM3 (ref 3.4–10.8)

## 2024-02-04 PROCEDURE — 80048 BASIC METABOLIC PNL TOTAL CA: CPT | Performed by: PHYSICIAN ASSISTANT

## 2024-02-04 PROCEDURE — G0378 HOSPITAL OBSERVATION PER HR: HCPCS

## 2024-02-04 PROCEDURE — 99222 1ST HOSP IP/OBS MODERATE 55: CPT | Performed by: NURSE PRACTITIONER

## 2024-02-04 PROCEDURE — 80076 HEPATIC FUNCTION PANEL: CPT | Performed by: NURSE PRACTITIONER

## 2024-02-04 PROCEDURE — 85025 COMPLETE CBC W/AUTO DIFF WBC: CPT | Performed by: PHYSICIAN ASSISTANT

## 2024-02-04 PROCEDURE — 86140 C-REACTIVE PROTEIN: CPT | Performed by: NURSE PRACTITIONER

## 2024-02-04 PROCEDURE — 25810000003 SODIUM CHLORIDE 0.9 % SOLUTION: Performed by: NURSE PRACTITIONER

## 2024-02-04 PROCEDURE — 25010000002 GADOTERIDOL PER 1 ML: Performed by: EMERGENCY MEDICINE

## 2024-02-04 PROCEDURE — 83690 ASSAY OF LIPASE: CPT | Performed by: NURSE PRACTITIONER

## 2024-02-04 PROCEDURE — A9579 GAD-BASE MR CONTRAST NOS,1ML: HCPCS | Performed by: EMERGENCY MEDICINE

## 2024-02-04 PROCEDURE — 74183 MRI ABD W/O CNTR FLWD CNTR: CPT

## 2024-02-04 RX ORDER — ROPINIROLE 1 MG/1
1 TABLET, FILM COATED ORAL NIGHTLY
Status: DISCONTINUED | OUTPATIENT
Start: 2024-02-04 | End: 2024-02-07 | Stop reason: HOSPADM

## 2024-02-04 RX ORDER — LORAZEPAM 1 MG/1
1 TABLET ORAL ONCE
Status: COMPLETED | OUTPATIENT
Start: 2024-02-04 | End: 2024-02-04

## 2024-02-04 RX ORDER — POLYETHYLENE GLYCOL 3350 17 G/17G
17 POWDER, FOR SOLUTION ORAL DAILY
Status: DISCONTINUED | OUTPATIENT
Start: 2024-02-04 | End: 2024-02-07 | Stop reason: HOSPADM

## 2024-02-04 RX ORDER — SODIUM CHLORIDE 9 MG/ML
100 INJECTION, SOLUTION INTRAVENOUS CONTINUOUS
Status: DISCONTINUED | OUTPATIENT
Start: 2024-02-04 | End: 2024-02-07 | Stop reason: HOSPADM

## 2024-02-04 RX ADMIN — HYDROXYZINE HYDROCHLORIDE 25 MG: 25 TABLET, FILM COATED ORAL at 00:39

## 2024-02-04 RX ADMIN — HYDROXYZINE HYDROCHLORIDE 25 MG: 25 TABLET, FILM COATED ORAL at 06:00

## 2024-02-04 RX ADMIN — HYDRALAZINE HYDROCHLORIDE 75 MG: 25 TABLET ORAL at 08:05

## 2024-02-04 RX ADMIN — HYDROXYZINE HYDROCHLORIDE 25 MG: 25 TABLET, FILM COATED ORAL at 18:15

## 2024-02-04 RX ADMIN — SODIUM CHLORIDE 100 ML/HR: 9 INJECTION, SOLUTION INTRAVENOUS at 08:03

## 2024-02-04 RX ADMIN — LORAZEPAM 1 MG: 1 TABLET ORAL at 10:43

## 2024-02-04 RX ADMIN — HYDRALAZINE HYDROCHLORIDE 75 MG: 25 TABLET ORAL at 21:41

## 2024-02-04 RX ADMIN — Medication 10 ML: at 08:06

## 2024-02-04 RX ADMIN — GABAPENTIN 100 MG: 100 CAPSULE ORAL at 21:41

## 2024-02-04 RX ADMIN — HYDROCHLOROTHIAZIDE 50 MG: 25 TABLET ORAL at 08:06

## 2024-02-04 RX ADMIN — FERROUS SULFATE TAB EC 324 MG (65 MG FE EQUIVALENT) 324 MG: 324 (65 FE) TABLET DELAYED RESPONSE at 08:06

## 2024-02-04 RX ADMIN — BENZONATATE 100 MG: 100 CAPSULE ORAL at 21:41

## 2024-02-04 RX ADMIN — Medication 5 MG: at 21:41

## 2024-02-04 RX ADMIN — ROPINIROLE HYDROCHLORIDE 1 MG: 1 TABLET, FILM COATED ORAL at 21:41

## 2024-02-04 RX ADMIN — URSODIOL 500 MG: 500 TABLET, FILM COATED ORAL at 08:06

## 2024-02-04 RX ADMIN — URSODIOL 500 MG: 500 TABLET, FILM COATED ORAL at 21:41

## 2024-02-04 RX ADMIN — POLYETHYLENE GLYCOL 3350 17 G: 17 POWDER, FOR SOLUTION ORAL at 16:52

## 2024-02-04 RX ADMIN — GADOTERIDOL 20 ML: 279.3 INJECTION, SOLUTION INTRAVENOUS at 15:31

## 2024-02-04 RX ADMIN — Medication 10 ML: at 21:40

## 2024-02-04 NOTE — PLAN OF CARE
Goal Outcome Evaluation:           Progress: no change  Outcome Evaluation: Pt cont to itch all over,  Atarax given, with little relief . GI is being consulted. Pt ambulated in room with no issues. pt has had no other complaints during the night. Will cont to monitor.

## 2024-02-04 NOTE — PLAN OF CARE
Goal Outcome Evaluation:  Plan of Care Reviewed With: patient           Outcome Evaluation: Pt had MRCP today.  Results pending. vss. Pt will likely d/c tomorrow.

## 2024-02-04 NOTE — H&P
[facility] Observation Unit H&P    Patient Name: Scarlet Gong  : 1955  MRN: [id number]  Primary Care Physician: Ruben Mejias MD  Date of admission: 2/3/2024     Patient Care Team:  Ruben Mejias MD as PCP - General (Family Medicine)  Hien Camacho MD as Consulting Physician (Cardiology)          Subjective   History Present Illness     Chief Complaint:   Chief Complaint   Patient presents with   • Itching     Itching     Itching  Associated symptoms include a rash. Pertinent negatives include no abdominal pain, fever, nausea or vomiting.     ED 2024  Patient is a 68-year-old female history of hyperlipidemia hypertension asthma presents to the ER with complaints of itching all over.  Patient states that she has been itching all over since January.  She states that she has been seen by a dermatologist and was started on a triamcinolone cream.  She states it is not helping.  Patient presents today complaining of persistent and severe itching that she cannot tolerate.  There is no rash hives or urticaria.  She reports no new soaps, laundry detergents lotions perfumes or medications.  No new antibiotics.  Patient reports that she had some severe epigastric abdominal pain yesterday but not today.  No nausea or vomiting.  No discoloration of her skin.  No nausea vomiting no history of liver disease.    ED 2024  Patient agrees with HPI noted above including generalized rash and itching with onset .  She saw dermatologist on Friday and tried the steroid cream that they prescribed without any relief.  She states that her symptoms flared up and she proceeded to the ED yesterday.  She reports that itching and rash have improved overnight.   Per ED notes patient's lipase were very elevated and she was admitted to the hobs unit for GI consultation.  Patient denies any abdominal pain but reports some soreness on the epigastric area.        Review of Systems   Constitutional: Negative for fever.    Skin:  Positive for itching and rash.   Gastrointestinal:  Negative for abdominal pain, nausea and vomiting.   All other systems reviewed and are negative.          Personal History     Past Medical History:   Past Medical History:   Diagnosis Date   • Asthma 04/27/2016   • Hyperlipidemia 03/02/2020   • Hypertension 03/02/2020   • Obesity (BMI 30-39.9) 03/02/2020   • Osteopenia 03/02/2020       Surgical History:      Past Surgical History:   Procedure Laterality Date   • CARDIAC CATHETERIZATION N/A 03/04/2020    Procedure: Left Heart Cath and coronary angiogram;  Surgeon: Hien Camacho MD;  Location:  [facility] CATH INVASIVE LOCATION;  Service: Cardiovascular;  Laterality: N/A;   • CARDIAC CATHETERIZATION N/A 03/04/2020    Procedure: Left ventriculography;  Surgeon: Hien Camacho MD;  Location:  [facility] CATH INVASIVE LOCATION;  Service: Cardiovascular;  Laterality: N/A;   • CARDIAC CATHETERIZATION N/A 01/30/2023    Procedure: Left Heart Cath and coronary angiogram;  Surgeon: Hien Camacho MD;  Location: [facility] CATH INVASIVE LOCATION;  Service: Cardiovascular;  Laterality: N/A;   • CARDIAC CATHETERIZATION N/A 01/30/2023    Procedure: Right Heart Cath;  Surgeon: Hien Camacho MD;  Location:  [facility] CATH INVASIVE LOCATION;  Service: Cardiovascular;  Laterality: N/A;   • HYSTERECTOMY             Family History: family history includes Colon cancer in her maternal grandmother; Heart disease in her brother and sister; Hypertension in her mother. Otherwise pertinent FHx was reviewed and unremarkable.     Social History:  reports that she has quit smoking. Her smoking use included cigarettes. She has a 48.00 pack-year smoking history. She has been exposed to tobacco smoke. She has never used smokeless tobacco. She reports that she does not drink alcohol and does not use drugs.      Medications:  Prior to Admission medications    Medication Sig Start Date End Date Taking? Authorizing Provider   albuterol sulfate   (90 Base) MCG/ACT inhaler Inhale 2 puffs Every 6 (Six) Hours As Needed. 6/23/22  Yes Lorraine Goyal MD   aspirin 81 MG EC tablet Take 1 tablet by mouth Daily.   Yes Lorraine Goyal MD   benzonatate (Tessalon Perles) 100 MG capsule Take 1 capsule by mouth 3 (Three) Times a Day As Needed for Cough. 11/17/23  Yes Antoinette Bernstein APRN   calcium carbonate (OS-JEANA) 600 MG tablet Take 1 tablet by mouth Daily.   Yes Lorraine Goyal MD   Cholecalciferol 50 MCG (2000 UT) capsule Take 2,000 Int'l Units by mouth Daily. 12/5/20  Yes Lorraine Goyal MD   ferrous sulfate 325 (65 FE) MG tablet Take 1 tablet by mouth Daily With Breakfast.   Yes Lorraine Goyal MD   gabapentin (NEURONTIN) 100 MG capsule Take 1 capsule by mouth Every Night. 11/30/23  Yes Antoinette Bernstein APRN   hydrALAZINE (APRESOLINE) 50 MG tablet Take 1.5 tablets by mouth 2 (Two) Times a Day. 7/6/22  Yes Lorraine Goyal MD   hydroCHLOROthiazide (HYDRODIURIL) 50 MG tablet Take 1 tablet by mouth Daily. 7/6/22  Yes Lorraine Goyal MD   hydrOXYzine (ATARAX) 25 MG tablet Take 1 tablet by mouth At Night As Needed for Itching.   Yes Lorraine Goyal MD   Potassium 99 MG tablet Take 1 tablet by mouth 2 (Two) Times a Day.   Yes Lorraine Goyal MD   Probiotic Product (Probiotic Blend) capsule Take 1 capsule by mouth Daily. 11/17/23  Yes Antoinette Bernstein APRN   rOPINIRole (REQUIP) 0.5 MG tablet Take 2 tablets by mouth Every Night. Take 1 hour before bedtime.   Yes Lorraine Goyal MD   triamcinolone (KENALOG) 0.1 % cream Apply 1 Application topically to the appropriate area as directed 2 (Two) Times a Day.   Yes Lorraine Goyal MD   zinc gluconate 50 MG tablet Take 1 tablet by mouth Daily. 12/5/20  Yes Lorraine Goyal MD       Allergies:    Allergies   Allergen Reactions   • Prednisone Rash   • Atorvastatin Itching   • Lisinopril Cough   • Azithromycin Rash     Re-entered from  "allergy \"Z-Pack\"       Objective   Objective     Vital Signs  Temp:  [97.8 °F (36.6 °C)-98.2 °F (36.8 °C)] 98 °F (36.7 °C)  Heart Rate:  [62-80] 65  Resp:  [16-19] 19  BP: (106-180)/(46-93) 137/49  SpO2:  [94 %-100 %] 96 %  on   ;   Device (Oxygen Therapy): room air  Body mass index is 38.59 kg/m².    Physical Exam  Vitals and nursing note reviewed.   Constitutional:       Appearance: Normal appearance.   HENT:      Head: Normocephalic and atraumatic.      Right Ear: External ear normal.      Left Ear: External ear normal.      Nose: Nose normal.      Mouth/Throat:      Mouth: Mucous membranes are moist.      Pharynx: Oropharynx is clear.   Eyes:      Extraocular Movements: Extraocular movements intact.   Cardiovascular:      Rate and Rhythm: Normal rate and regular rhythm.      Pulses: Normal pulses.      Heart sounds: Normal heart sounds.   Pulmonary:      Effort: Pulmonary effort is normal.      Breath sounds: Normal breath sounds.   Abdominal:      General: Abdomen is flat. Bowel sounds are normal.      Palpations: Abdomen is soft.      Tenderness: There is abdominal tenderness.      Comments: Mild tenderness on epigastric area   Musculoskeletal:         General: Normal range of motion.      Cervical back: Normal range of motion.   Skin:     General: Skin is warm.      Comments: Mild papules/erythema noted on both arms.    Neurological:      General: No focal deficit present.      Mental Status: She is alert and oriented to person, place, and time.   Psychiatric:         Mood and Affect: Mood normal.         Behavior: Behavior normal.           Results Review:  I have personally reviewed most recent lab results and radiology images and interpretations and agree with findings, most notably: CMP, C-reactive protein, lipase, CBC, CT abdomen pelvis and ultrasound liver.    Results from last 7 days   Lab Units 02/04/24  0557   WBC 10*3/mm3 11.20*   HEMOGLOBIN g/dL 12.1   HEMATOCRIT % 37.6   PLATELETS 10*3/mm3 371 "     Results from last 7 days   Lab Units 02/04/24  0758 02/03/24  1338   SODIUM mmol/L 138 140   POTASSIUM mmol/L 3.8 3.5   CHLORIDE mmol/L 102 100   CO2 mmol/L 24.0 25.0   BUN mg/dL 20 18   CREATININE mg/dL 0.59 0.67   GLUCOSE mg/dL 133* 98   CALCIUM mg/dL 9.3 10.5   ALK PHOS U/L  --  699*   ALT (SGPT) U/L  --  798*   AST (SGOT) U/L  --  415*     Estimated Creatinine Clearance: 99.8 mL/min (by C-G formula based on SCr of 0.59 mg/dL).  Brief Urine Lab Results  (Last result in the past 365 days)        Color   Clarity   Blood   Leuk Est   Nitrite   Protein   CREAT   Urine HCG        11/08/23 0803 Yellow   Clear   Negative   Negative   Negative   Negative                   Microbiology Results (last 10 days)       ** No results found for the last 240 hours. **            ECG/EMG Results (most recent)       None            Results for orders placed during the hospital encounter of 06/22/23    Doppler Ankle Brachial Index Single Level CAR    Interpretation Summary  •  Right Conclusion: The right JALEN is normal. Normal digital pressures.  •  Left Conclusion: The left JALEN is normal. Normal digital pressures.      Results for orders placed during the hospital encounter of 11/07/23    Adult Transthoracic Echo Complete W/ Cont if Necessary Per Protocol    Interpretation Summary  •  Left ventricular systolic function is normal. Calculated left ventricular EF = 59% Left ventricular ejection fraction appears to be 56 - 60%.  •  Left ventricular diastolic function is consistent with (grade I) impaired relaxation.  •  Estimated right ventricular systolic pressure from tricuspid regurgitation is normal (<35 mmHg).  •  No significant valvular abnormalities.      CT Abdomen Pelvis With Contrast    Result Date: 2/3/2024  The gallbladder is not well-distended there is wall thickening and probable pericholecystic inflammation. Correlate for acute cholecystitis. The common bile duct is dilated up to 1.3 cm. No obstructing stones are seen  within the bile duct. There are no findings of pancreatitis on CT. Pancreatitis is a clinical diagnosis. No complications of pancreatitis on this exam. Mild hepatomegaly with hepatic steatosis. Moderate stool burden correlate for constipation. Nonobstructing small calculus mid left kidney. Electronically Signed: Milagro Collins MD  2/3/2024 4:56 PM EST  Workstation ID: [id number]    US Liver    Result Date: 2/3/2024  Impression: 1. Cholelithiasis. No evidence of biliary tract obstruction. 2. Hepatic steatosis Electronically Signed: Anthony Tong MD  2/3/2024 4:09 PM EST  Workstation ID: [id number]       Estimated Creatinine Clearance: 99.8 mL/min (by C-G formula based on SCr of 0.59 mg/dL).    Assessment & Plan   Assessment/Plan       Active Hospital Problems    Diagnosis  POA   • **Pancreatitis [K85.90]  Yes      Resolved Hospital Problems   No resolved problems to display.       Elevated LFTs  Lab Results   Component Value Date    WBC 11.20 (H) 02/04/2024     (H) 02/03/2024     (H) 02/03/2024    ALKPHOS 699 (H) 02/03/2024    BILITOT 1.5 (H) 02/03/2024    LIPASE 135 (H) 02/04/2024   -Lipase 1065 on admission and 135 today  -CMP and CBC generally unremarkable  -CT of abdomen and pelvis: Showed possible pericholecystic inflammation versus acute cholecystitis.  Common bile duct is dilated up to 1.3 cm.  No obstructive stones are seen within the bile duct.  No findings of pancreatitis on CT.  Mild hepatomegaly with hepatic steatosis.  Moderate stool burden and no obstructing small calculus mid left kidney  -In the ED patient received Pepcid  -GI was consulted and recommended MRCP, ordered by ED provider  -NPO for MRCP, ativan x1 ordered as patient reports she is claustrophobic.   -C-reactive protein 1.81  -Monitor patient's progress and labs while admitted     Pruritus   -Seen by dermatology recently and tried steroid cream without any relief.   -In the ED patient received Benadryl and Pepcid  -Per GIs notes,  possibly biliary related  -Continue hydroxyzine and ursodiol per GI's recommendations     Hypertension  -Moderately controlled   BP Readings from Last 1 Encounters:   02/04/24 137/49   - Continue hydralazine and HCTZ  - Monitor while admitted     Restless leg syndrome  -Continue Requip    Obesity  -BMI 38.59  -Lifestyle modification    VTE Prophylaxis -   Mechanical Order History:        Ordered        02/03/24 2226  Place Sequential Compression Device  Once            02/03/24 2226  Maintain Sequential Compression Device  Continuous                          Pharmalogical Order History:       None            CODE STATUS:    Code Status and Medical Interventions:   Ordered at: 02/03/24 2124     Level Of Support Discussed With:    Patient     Code Status (Patient has no pulse and is not breathing):    CPR (Attempt to Resuscitate)     Medical Interventions (Patient has pulse or is breathing):    Full Support       This patient has been examined wearing personal protective equipment.     I discussed the patient's findings and my recommendations with patient and nursing staff.      Signature:Electronically signed by ANGELINA Olivares, 02/04/24, 11:23 AM EST.

## 2024-02-04 NOTE — CONSULTS
GI CONSULT  NOTE:    Referring Provider:     ERIKA Raphael    Chief complaint:    Elevated LFTs, pancreatitis    Subjective   epigastric pain and itching    History of present illness:     Patient is a 68-year-old female with a history of iron deficiency anemia, hypertension and asthma who presented to the ER on 2/3/2024 with a complaint of itching and epigastric pain.  Patient had epigastric pain on 2/2/2024.  Patient has had itching for some time and was seen by dermatologist and given topical creams which did not help.  Patient was evaluated in the ER found to have elevated LFTs and CT of the abdomen pelvis showed gallbladder wall thickening with CBD 1.3 cm hepatic steatosis and moderate stool burden.  Ultrasound was done that showed gallstones hepatic steatosis and CBD of 5 mm.  Lipase was noted to be 1065.  Patient was admitted into the hospital for possible acute pancreatitis elevated LFTs dilated biliary tract and pruritus.  GI was consulted for epigastric pain and itching.  Patient states she developed itching in January and has been seen by the dermatologist twice the last time being Friday 2/20/24.  Patient states she has been having epigastric pain for years and is always complained of a soreness to her doctors.  Patient denies any reflux and does not take a PPI on a daily basis.  Patient had nausea on Friday, 2/2/2024 without vomiting.  Patient states sometimes eating exacerbates her symptoms.  Her pain is in the epigastric area and again is intermittent in nature.  Patient denies any constipation stating she has a soft brown bowel movement daily.  Patient takes occasional Aleve last time being 2 weeks ago.  No personal or family history of pancreatitis or pancreatic cancer.  Patient denies any alcohol or tobacco usage.  Maternal grandmother had a history of colon cancer.      Endo History:  2019 colonoscopy (Dr. John) no recurrent polyps.  Random colon biopsies negative for microscopic colitis.  Small  internal hemorrhoids.  Recall 2024. 2008 colonoscopy (Dr. John) hyperplastic polyps.  Grade 1 internal hemorrhoids.    Past Medical History:  Past Medical History:   Diagnosis Date    Asthma 04/27/2016    Hyperlipidemia 03/02/2020    Hypertension 03/02/2020    Obesity (BMI 30-39.9) 03/02/2020    Osteopenia 03/02/2020       Past Surgical History:  Past Surgical History:   Procedure Laterality Date    CARDIAC CATHETERIZATION N/A 03/04/2020    Procedure: Left Heart Cath and coronary angiogram;  Surgeon: Hien Camacho MD;  Location:  [facility] CATH INVASIVE LOCATION;  Service: Cardiovascular;  Laterality: N/A;    CARDIAC CATHETERIZATION N/A 03/04/2020    Procedure: Left ventriculography;  Surgeon: Hien Camacho MD;  Location:  [facility] CATH INVASIVE LOCATION;  Service: Cardiovascular;  Laterality: N/A;    CARDIAC CATHETERIZATION N/A 01/30/2023    Procedure: Left Heart Cath and coronary angiogram;  Surgeon: Hien Camacho MD;  Location:  [facility] CATH INVASIVE LOCATION;  Service: Cardiovascular;  Laterality: N/A;    CARDIAC CATHETERIZATION N/A 01/30/2023    Procedure: Right Heart Cath;  Surgeon: Hien Camacho MD;  Location:  [facility] CATH INVASIVE LOCATION;  Service: Cardiovascular;  Laterality: N/A;    HYSTERECTOMY         Social History:  Social History     Tobacco Use    Smoking status: Former     Packs/day: 1.50     Years: 32.00     Additional pack years: 0.00     Total pack years: 48.00     Types: Cigarettes     Passive exposure: Past    Smokeless tobacco: Never   Vaping Use    Vaping Use: Never used   Substance Use Topics    Alcohol use: Never    Drug use: Never       Family History:  Family History   Problem Relation Age of Onset    Hypertension Mother     Heart disease Sister     Heart disease Brother     Colon cancer Maternal Grandmother        Medications:  Medications Prior to Admission   Medication Sig Dispense Refill Last Dose    albuterol sulfate  (90 Base) MCG/ACT inhaler Inhale 2 puffs Every  6 (Six) Hours As Needed.       aspirin 81 MG EC tablet Take 1 tablet by mouth Daily.       benzonatate (Tessalon Perles) 100 MG capsule Take 1 capsule by mouth 3 (Three) Times a Day As Needed for Cough. 30 capsule 1     calcium carbonate (OS-JEANA) 600 MG tablet Take 1 tablet by mouth Daily.       Cholecalciferol 50 MCG (2000 UT) capsule Take 2,000 Int'l Units by mouth Daily.       ferrous sulfate 325 (65 FE) MG tablet Take 1 tablet by mouth Daily With Breakfast.       gabapentin (NEURONTIN) 100 MG capsule Take 1 capsule by mouth Every Night. 30 capsule 0     hydrALAZINE (APRESOLINE) 50 MG tablet Take 1.5 tablets by mouth 2 (Two) Times a Day.       hydroCHLOROthiazide (HYDRODIURIL) 50 MG tablet Take 1 tablet by mouth Daily.       hydrOXYzine (ATARAX) 25 MG tablet Take 1 tablet by mouth At Night As Needed for Itching.       Potassium 99 MG tablet Take 1 tablet by mouth 2 (Two) Times a Day.       Probiotic Product (Probiotic Blend) capsule Take 1 capsule by mouth Daily. 90 capsule 3     rOPINIRole (REQUIP) 0.5 MG tablet Take 2 tablets by mouth Every Night. Take 1 hour before bedtime.       triamcinolone (KENALOG) 0.1 % cream Apply 1 Application topically to the appropriate area as directed 2 (Two) Times a Day.       zinc gluconate 50 MG tablet Take 1 tablet by mouth Daily.          Scheduled Meds:ferrous sulfate, 324 mg, Oral, Daily With Breakfast  gabapentin, 100 mg, Oral, Nightly  hydrALAZINE, 75 mg, Oral, BID  hydroCHLOROthiazide, 50 mg, Oral, Daily  rOPINIRole, 1 mg, Oral, Nightly  senna-docusate sodium, 2 tablet, Oral, BID  sodium chloride, 10 mL, Intravenous, Q12H  ursodiol, 500 mg, Oral, Q12H      Continuous Infusions:sodium chloride, 100 mL/hr, Last Rate: 100 mL/hr (02/04/24 0803)      PRN Meds:.  acetaminophen    albuterol    benzonatate    senna-docusate sodium **AND** polyethylene glycol **AND** bisacodyl **AND** bisacodyl    hydrOXYzine    melatonin    ondansetron    [COMPLETED] Insert Peripheral IV **AND**  sodium chloride    sodium chloride    sodium chloride    ALLERGIES:  Prednisone, Atorvastatin, Lisinopril, and Azithromycin    ROS:  Review of Systems   Gastrointestinal:  Positive for abdominal pain and nausea. Negative for constipation, diarrhea and vomiting.   Skin:         itching       The following systems were reviewed and negative;    Constitution:  No fevers, chills, no unintentional weight loss  Skin: no rash, no jaundice  Eyes:  No blurry vision, no eye pain  HENT:  No change in hearing or smell  Resp:  No dyspnea or cough  CV:  No chest pain or palpitations  :  No dysuria, hematuria  Musculoskeletal:  No leg cramps or arthralgias  Neuro:  No tremor, no numbness  Psych:  No depression or confusion    Objective resting in hospital bed.  NAD.  Room 231.  No family present.    Vital Signs:   Vitals:    02/03/24 2206 02/04/24 0236 02/04/24 0503 02/04/24 0803   BP: 106/46 153/62 168/66 139/56   BP Location: Right arm Left arm Left arm    Patient Position: Sitting Lying Sitting    Pulse: 75 65 62 65   Resp: 18 16 16    Temp: 97.8 °F (36.6 °C) 97.8 °F (36.6 °C)     TempSrc: Oral Oral Oral    SpO2: 99% 94% 95%    Weight:       Height:           Physical Exam:    General Appearance:    Awake and alert, in no acute distress   Head:    Normocephalic, without obvious abnormality, atraumatic   Eyes:            Conjunctivae normal, anicteric sclerae, pupils equal   Ears:    Ears appear intact with no abnormalities noted   Throat:   No oral lesions, no thrush, oral mucosa moist   Neck:   Supple, no JVD   Lungs:      respirations regular, even and unlabored        Chest Wall:    No abnormalities observed   Abdomen:     Normal bowel sounds, soft, nontender, no rebound or guarding, nondistended, no hepatosplenomegaly   Rectal:     Deferred   Extremities:   Moves all extremities, no edema, no cyanosis   Pulses:   Pulses palpable and equal bilaterally   Skin:   No rash, no jaundice, normal palpation        Neurologic:    "Cranial nerves 2 - 12 grossly intact, no asterixis       Results Review:   I reviewed the patient's labs and imaging.  CBC    Results from last 7 days   Lab Units 02/04/24  0557 02/03/24  1338   WBC 10*3/mm3 11.20* 10.10   HEMOGLOBIN g/dL 12.1 13.1   PLATELETS 10*3/mm3 371 422     CMP   Results from last 7 days   Lab Units 02/04/24  0758 02/03/24  1338   SODIUM mmol/L 138 140   POTASSIUM mmol/L 3.8 3.5   CHLORIDE mmol/L 102 100   CO2 mmol/L 24.0 25.0   BUN mg/dL 20 18   CREATININE mg/dL 0.59 0.67   GLUCOSE mg/dL 133* 98   ALBUMIN g/dL  --  4.4   BILIRUBIN mg/dL  --  1.5*   ALK PHOS U/L  --  699*   AST (SGOT) U/L  --  415*   ALT (SGPT) U/L  --  798*   LIPASE U/L  --  1,065*     Cr Clearance Estimated Creatinine Clearance: 99.8 mL/min (by C-G formula based on SCr of 0.59 mg/dL).  Coag     HbA1C   Lab Results   Component Value Date    HGBA1C 6.40 (H) 11/30/2023    HGBA1C 6.2 (H) 03/04/2020     Blood Glucose No results found for: \"POCGLU\"  Infection     UA      Radiology(recent) CT Abdomen Pelvis With Contrast    Result Date: 2/3/2024  The gallbladder is not well-distended there is wall thickening and probable pericholecystic inflammation. Correlate for acute cholecystitis. The common bile duct is dilated up to 1.3 cm. No obstructing stones are seen within the bile duct. There are no findings of pancreatitis on CT. Pancreatitis is a clinical diagnosis. No complications of pancreatitis on this exam. Mild hepatomegaly with hepatic steatosis. Moderate stool burden correlate for constipation. Nonobstructing small calculus mid left kidney. Electronically Signed: Milagro Collins MD  2/3/2024 4:56 PM EST  Workstation ID: [id number]    US Liver    Result Date: 2/3/2024  Impression: 1. Cholelithiasis. No evidence of biliary tract obstruction. 2. Hepatic steatosis Electronically Signed: Anthony Tong MD  2/3/2024 4:09 PM EST  Workstation ID: [id number]       ASSESSMENT:  Elevated LFT's & lipase consider gallstone versus medication " pancreatitis  Gallstones  LEEANNE   HTN  Asthma   Pruritus consider biliary related  Constipation per CT  Leukocytosis  Family history of colon cancer-maternal grandmother    PLAN:   Continue ursodiol and hydroxyzine for itching.  Pending MRCP.  Had order stat so it will be done today.  May need ERCP.  I will check CRP and repeat lipase today.  Consider discontinuing hydrochlorothiazide.  Will start MiraLAX once a day when she is not NPO.    Question Dulcolax suppository      I discussed the patient's findings and my recommendations with the patient.  Laura Martins, APRN  02/04/24  08:33 EST    Time:

## 2024-02-05 ENCOUNTER — INPATIENT HOSPITAL (OUTPATIENT)
Dept: URBAN - METROPOLITAN AREA HOSPITAL 84 | Facility: HOSPITAL | Age: 69
End: 2024-02-05
Payer: MEDICAID

## 2024-02-05 DIAGNOSIS — D72.829 ELEVATED WHITE BLOOD CELL COUNT, UNSPECIFIED: ICD-10-CM

## 2024-02-05 DIAGNOSIS — D50.9 IRON DEFICIENCY ANEMIA, UNSPECIFIED: ICD-10-CM

## 2024-02-05 DIAGNOSIS — R94.5 ABNORMAL RESULTS OF LIVER FUNCTION STUDIES: ICD-10-CM

## 2024-02-05 DIAGNOSIS — K80.20 CALCULUS OF GALLBLADDER WITHOUT CHOLECYSTITIS WITHOUT OBSTRU: ICD-10-CM

## 2024-02-05 DIAGNOSIS — R10.816 EPIGASTRIC ABDOMINAL TENDERNESS: ICD-10-CM

## 2024-02-05 DIAGNOSIS — L29.8 OTHER PRURITUS: ICD-10-CM

## 2024-02-05 DIAGNOSIS — R93.3 ABNORMAL FINDINGS ON DIAGNOSTIC IMAGING OF OTHER PARTS OF DI: ICD-10-CM

## 2024-02-05 DIAGNOSIS — K59.00 CONSTIPATION, UNSPECIFIED: ICD-10-CM

## 2024-02-05 LAB
ALBUMIN SERPL-MCNC: 3.5 G/DL (ref 3.5–5.2)
ALBUMIN/GLOB SERPL: 1.2 G/DL
ALP SERPL-CCNC: 446 U/L (ref 39–117)
ALT SERPL W P-5'-P-CCNC: 326 U/L (ref 1–33)
ANION GAP SERPL CALCULATED.3IONS-SCNC: 11 MMOL/L (ref 5–15)
AST SERPL-CCNC: 63 U/L (ref 1–32)
BASOPHILS # BLD AUTO: 0 10*3/MM3 (ref 0–0.2)
BASOPHILS NFR BLD AUTO: 0.2 % (ref 0–1.5)
BILIRUB SERPL-MCNC: 0.6 MG/DL (ref 0–1.2)
BUN SERPL-MCNC: 17 MG/DL (ref 8–23)
BUN/CREAT SERPL: 27.9 (ref 7–25)
CALCIUM SPEC-SCNC: 9.1 MG/DL (ref 8.6–10.5)
CHLORIDE SERPL-SCNC: 101 MMOL/L (ref 98–107)
CO2 SERPL-SCNC: 24 MMOL/L (ref 22–29)
CREAT SERPL-MCNC: 0.61 MG/DL (ref 0.57–1)
CRP SERPL-MCNC: 3.1 MG/DL (ref 0–0.5)
DEPRECATED RDW RBC AUTO: 49.9 FL (ref 37–54)
EGFRCR SERPLBLD CKD-EPI 2021: 98.1 ML/MIN/1.73
EOSINOPHIL # BLD AUTO: 0.2 10*3/MM3 (ref 0–0.4)
EOSINOPHIL NFR BLD AUTO: 1.9 % (ref 0.3–6.2)
ERYTHROCYTE [DISTWIDTH] IN BLOOD BY AUTOMATED COUNT: 16 % (ref 12.3–15.4)
GGT SERPL-CCNC: 916 U/L (ref 5–36)
GLOBULIN UR ELPH-MCNC: 2.9 GM/DL
GLUCOSE SERPL-MCNC: 150 MG/DL (ref 65–99)
HCT VFR BLD AUTO: 34.4 % (ref 34–46.6)
HGB BLD-MCNC: 11.5 G/DL (ref 12–15.9)
LIPASE SERPL-CCNC: 129 U/L (ref 13–60)
LYMPHOCYTES # BLD AUTO: 2.5 10*3/MM3 (ref 0.7–3.1)
LYMPHOCYTES NFR BLD AUTO: 24.3 % (ref 19.6–45.3)
MAGNESIUM SERPL-MCNC: 1.7 MG/DL (ref 1.6–2.4)
MCH RBC QN AUTO: 28.2 PG (ref 26.6–33)
MCHC RBC AUTO-ENTMCNC: 33.3 G/DL (ref 31.5–35.7)
MCV RBC AUTO: 84.7 FL (ref 79–97)
MONOCYTES # BLD AUTO: 0.7 10*3/MM3 (ref 0.1–0.9)
MONOCYTES NFR BLD AUTO: 6.6 % (ref 5–12)
NEUTROPHILS NFR BLD AUTO: 6.8 10*3/MM3 (ref 1.7–7)
NEUTROPHILS NFR BLD AUTO: 67 % (ref 42.7–76)
NRBC BLD AUTO-RTO: 0.1 /100 WBC (ref 0–0.2)
PLATELET # BLD AUTO: 321 10*3/MM3 (ref 140–450)
PMV BLD AUTO: 7.7 FL (ref 6–12)
POTASSIUM SERPL-SCNC: 3.4 MMOL/L (ref 3.5–5.2)
PROT SERPL-MCNC: 6.4 G/DL (ref 6–8.5)
RBC # BLD AUTO: 4.06 10*6/MM3 (ref 3.77–5.28)
SODIUM SERPL-SCNC: 136 MMOL/L (ref 136–145)
WBC NRBC COR # BLD AUTO: 10.2 10*3/MM3 (ref 3.4–10.8)

## 2024-02-05 PROCEDURE — 99222 1ST HOSP IP/OBS MODERATE 55: CPT | Performed by: SURGERY

## 2024-02-05 PROCEDURE — 82977 ASSAY OF GGT: CPT | Performed by: NURSE PRACTITIONER

## 2024-02-05 PROCEDURE — 83690 ASSAY OF LIPASE: CPT | Performed by: NURSE PRACTITIONER

## 2024-02-05 PROCEDURE — 80053 COMPREHEN METABOLIC PANEL: CPT | Performed by: NURSE PRACTITIONER

## 2024-02-05 PROCEDURE — 86015 ACTIN ANTIBODY EACH: CPT | Performed by: NURSE PRACTITIONER

## 2024-02-05 PROCEDURE — 86038 ANTINUCLEAR ANTIBODIES: CPT | Performed by: NURSE PRACTITIONER

## 2024-02-05 PROCEDURE — 86381 MITOCHONDRIAL ANTIBODY EACH: CPT | Performed by: NURSE PRACTITIONER

## 2024-02-05 PROCEDURE — 86140 C-REACTIVE PROTEIN: CPT | Performed by: NURSE PRACTITIONER

## 2024-02-05 PROCEDURE — G0378 HOSPITAL OBSERVATION PER HR: HCPCS

## 2024-02-05 PROCEDURE — 85025 COMPLETE CBC W/AUTO DIFF WBC: CPT | Performed by: NURSE PRACTITIONER

## 2024-02-05 PROCEDURE — 83735 ASSAY OF MAGNESIUM: CPT | Performed by: NURSE PRACTITIONER

## 2024-02-05 PROCEDURE — 25810000003 SODIUM CHLORIDE 0.9 % SOLUTION: Performed by: NURSE PRACTITIONER

## 2024-02-05 PROCEDURE — 99232 SBSQ HOSP IP/OBS MODERATE 35: CPT | Performed by: NURSE PRACTITIONER

## 2024-02-05 RX ORDER — POTASSIUM CHLORIDE 20 MEQ/1
40 TABLET, EXTENDED RELEASE ORAL ONCE
Status: COMPLETED | OUTPATIENT
Start: 2024-02-05 | End: 2024-02-05

## 2024-02-05 RX ADMIN — DOCUSATE SODIUM 50 MG AND SENNOSIDES 8.6 MG 2 TABLET: 8.6; 5 TABLET, FILM COATED ORAL at 09:16

## 2024-02-05 RX ADMIN — Medication 10 ML: at 21:45

## 2024-02-05 RX ADMIN — SODIUM CHLORIDE 100 ML/HR: 9 INJECTION, SOLUTION INTRAVENOUS at 12:58

## 2024-02-05 RX ADMIN — POLYETHYLENE GLYCOL 3350 17 G: 17 POWDER, FOR SOLUTION ORAL at 09:16

## 2024-02-05 RX ADMIN — Medication 10 ML: at 09:20

## 2024-02-05 RX ADMIN — SODIUM CHLORIDE 100 ML/HR: 9 INJECTION, SOLUTION INTRAVENOUS at 23:31

## 2024-02-05 RX ADMIN — URSODIOL 500 MG: 500 TABLET, FILM COATED ORAL at 21:32

## 2024-02-05 RX ADMIN — URSODIOL 500 MG: 500 TABLET, FILM COATED ORAL at 09:16

## 2024-02-05 RX ADMIN — GABAPENTIN 100 MG: 100 CAPSULE ORAL at 21:32

## 2024-02-05 RX ADMIN — HYDROXYZINE HYDROCHLORIDE 25 MG: 25 TABLET, FILM COATED ORAL at 21:32

## 2024-02-05 RX ADMIN — FERROUS SULFATE TAB EC 324 MG (65 MG FE EQUIVALENT) 324 MG: 324 (65 FE) TABLET DELAYED RESPONSE at 09:16

## 2024-02-05 RX ADMIN — HYDROXYZINE HYDROCHLORIDE 25 MG: 25 TABLET, FILM COATED ORAL at 09:16

## 2024-02-05 RX ADMIN — HYDRALAZINE HYDROCHLORIDE 75 MG: 25 TABLET ORAL at 21:32

## 2024-02-05 RX ADMIN — ROPINIROLE HYDROCHLORIDE 1 MG: 1 TABLET, FILM COATED ORAL at 21:32

## 2024-02-05 RX ADMIN — HYDROCHLOROTHIAZIDE 50 MG: 25 TABLET ORAL at 09:16

## 2024-02-05 RX ADMIN — DOCUSATE SODIUM 50 MG AND SENNOSIDES 8.6 MG 2 TABLET: 8.6; 5 TABLET, FILM COATED ORAL at 21:35

## 2024-02-05 RX ADMIN — POTASSIUM CHLORIDE 40 MEQ: 1500 TABLET, EXTENDED RELEASE ORAL at 09:16

## 2024-02-05 RX ADMIN — HYDRALAZINE HYDROCHLORIDE 75 MG: 25 TABLET ORAL at 09:16

## 2024-02-05 NOTE — PLAN OF CARE
Goal Outcome Evaluation:  Plan of Care Reviewed With: patient        Progress: improving  Outcome Evaluation: Pt restinng in bed, NS running at 100 ml/hr as ordered. General surgey consult complete today, scheduled for cholecystectomy 2/6/24. Call light within reach.

## 2024-02-05 NOTE — CASE MANAGEMENT/SOCIAL WORK
Continued Stay Note  [facility]     Patient Name: Scarlet Gong  MRN: [id number]  Today's Date: 2/5/2024    Admit Date: 2/3/2024    Plan: Return home   Discharge Plan       Row Name 02/05/24 1617       Plan    Plan Return home    Plan Comments Barriers: General surgery is plan for cholecystectomy on February 6.                        Phone communication or documentation only - no physical contact with patient or family.   Sussy HOFFMAN,RN Case Manager  [facility]  Phone: Desk- [phone] cell- [phone]

## 2024-02-05 NOTE — PROGRESS NOTES
LOS: 0 days   Patient Care Team:  Ruben Mejias MD as PCP - General (Family Medicine)  Hien Camacho MD as Consulting Physician (Cardiology)      Subjective     Interval History:     Subjective: Patient reports persistent itching.  Continues to complain of epigastric/chest pain along with nausea.  Denies vomiting.      ROS:   No chest pain, shortness of breath, or cough.        Medication Review:     Current Facility-Administered Medications:     acetaminophen (TYLENOL) tablet 650 mg, 650 mg, Oral, Q4H PRN, Sussy Raphael PA    albuterol (PROVENTIL) nebulizer solution 0.083% 2.5 mg/3mL, 2.5 mg, Nebulization, Q6H PRN, Sussy Raphael PA    benzonatate (TESSALON) capsule 100 mg, 100 mg, Oral, TID PRN, Sussy Raphael PA, 100 mg at 02/04/24 2141    sennosides-docusate (PERICOLACE) 8.6-50 MG per tablet 2 tablet, 2 tablet, Oral, BID, 2 tablet at 02/05/24 0916 **AND** [DISCONTINUED] polyethylene glycol (MIRALAX) packet 17 g, 17 g, Oral, Daily PRN **AND** bisacodyl (DULCOLAX) EC tablet 5 mg, 5 mg, Oral, Daily PRN **AND** bisacodyl (DULCOLAX) suppository 10 mg, 10 mg, Rectal, Daily PRN, Sussy Raphael PA    Calcium Replacement - Follow Nurse / BPA Driven Protocol, , Does not apply, PRN, Sheriat Ervin, APRN    ferrous sulfate EC tablet 324 mg, 324 mg, Oral, Daily With Breakfast, Sussy Raphael PA, 324 mg at 02/05/24 0916    gabapentin (NEURONTIN) capsule 100 mg, 100 mg, Oral, Nightly, Sussy Raphael PA, 100 mg at 02/04/24 2141    hydrALAZINE (APRESOLINE) tablet 75 mg, 75 mg, Oral, BID, Sussy Raphael PA, 75 mg at 02/05/24 0916    hydroCHLOROthiazide (HYDRODIURIL) tablet 50 mg, 50 mg, Oral, Daily, Sussy Raphael PA, 50 mg at 02/05/24 0916    hydrOXYzine (ATARAX) tablet 25 mg, 25 mg, Oral, Q6H PRN, Sussy Raphael PA, 25 mg at 02/05/24 0916    Magnesium Standard Dose Replacement - Follow Nurse / BPA Driven Protocol, , Does not apply, PRN, Sherita Ervin, ANGELINA    melatonin tablet 5 mg, 5 mg,  Oral, Nightly PRN, Sussy Raphael PA, 5 mg at 02/04/24 2141    ondansetron (ZOFRAN) injection 4 mg, 4 mg, Intravenous, Q6H PRN, Sussy Raphael PA    Phosphorus Replacement - Follow Nurse / BPA Driven Protocol, , Does not apply, PRN, Tripure, Sherita S, APRN    polyethylene glycol (MIRALAX) packet 17 g, 17 g, Oral, Daily, Laura Martins, APRN, 17 g at 02/05/24 0916    Potassium Replacement - Follow Nurse / BPA Driven Protocol, , Does not apply, PRN, Tripure, Sherita S, APRN    rOPINIRole (REQUIP) tablet 1 mg, 1 mg, Oral, Nightly, TripBenitez nicolea S, APRN, 1 mg at 02/04/24 2141    [COMPLETED] Insert Peripheral IV, , , Once **AND** sodium chloride 0.9 % flush 10 mL, 10 mL, Intravenous, PRN, Sussy Raphael PA    sodium chloride 0.9 % flush 10 mL, 10 mL, Intravenous, Q12H, Sussy Raphael PA, 10 mL at 02/05/24 0920    sodium chloride 0.9 % flush 10 mL, 10 mL, Intravenous, PRN, Sussy Raphael PA    sodium chloride 0.9 % infusion 40 mL, 40 mL, Intravenous, PRN, Sussy Raphael PA    sodium chloride 0.9 % infusion, 100 mL/hr, Intravenous, Continuous, Sherita Ervin, APRN, Last Rate: 100 mL/hr at 02/04/24 1654, 100 mL/hr at 02/04/24 1654    ursodiol (ACTIGALL) tablet 500 mg, 500 mg, Oral, Q12H, Sussy Raphael PA, 500 mg at 02/05/24 0916      Objective     Vital Signs  Vitals:    02/04/24 2300 02/05/24 0300 02/05/24 0630 02/05/24 1012   BP: 153/63 138/62 143/54 145/54   BP Location: Left arm Left arm Left arm Left arm   Patient Position: Lying Lying Lying Lying   Pulse:    85   Resp: 16 16 15 19   Temp: 98.3 °F (36.8 °C) 98.1 °F (36.7 °C) 98.3 °F (36.8 °C) 95.9 °F (35.5 °C)   TempSrc: Oral Oral Oral Oral   SpO2: 95% 94%  97%   Weight:       Height:           Physical Exam:     General Appearance:    Awake and alert, in no acute distress   Head:    Normocephalic, without obvious abnormality   Eyes:          Conjunctivae normal, anicteric sclera   Throat:   No oral lesions, no thrush, oral mucosa moist    Neck:   No adenopathy, supple, no JVD   Lungs:     respirations regular, even and unlabored   Abdomen:     Soft, epigastric tenderness, no rebound or guarding, non-distended   Rectal:     Deferred   Extremities:   No edema, no cyanosis   Skin:   No bruising or rash, no jaundice        Results Review:    CBC    Results from last 7 days   Lab Units 02/05/24  0324 02/04/24  0557 02/03/24  1338   WBC 10*3/mm3 10.20 11.20* 10.10   HEMOGLOBIN g/dL 11.5* 12.1 13.1   PLATELETS 10*3/mm3 321 371 422     CMP   Results from last 7 days   Lab Units 02/05/24  0324 02/04/24  0758 02/03/24  1338   SODIUM mmol/L 136 138 140   POTASSIUM mmol/L 3.4* 3.8 3.5   CHLORIDE mmol/L 101 102 100   CO2 mmol/L 24.0 24.0 25.0   BUN mg/dL 17 20 18   CREATININE mg/dL 0.61 0.59 0.67   GLUCOSE mg/dL 150* 133* 98   ALBUMIN g/dL 3.5 4.0 4.4   BILIRUBIN mg/dL 0.6 0.9 1.5*   ALK PHOS U/L 446* 533* 699*   AST (SGOT) U/L 63* 150* 415*   ALT (SGPT) U/L 326* 501* 798*   MAGNESIUM mg/dL 1.7  --   --    LIPASE U/L 129* 135* 1,065*     Cr Clearance Estimated Creatinine Clearance: 96.5 mL/min (by C-G formula based on SCr of 0.61 mg/dL).  Coag     HbA1C   Lab Results   Component Value Date    HGBA1C 6.40 (H) 11/30/2023    HGBA1C 6.2 (H) 03/04/2020         Infection     UA      Microbiology Results (last 10 days)       ** No results found for the last 240 hours. **          Imaging Results (Last 72 Hours)       Procedure Component Value Units Date/Time    MRI abdomen w wo contrast mrcp [[id number]] Collected: 02/04/24 1535     Updated: 02/04/24 1558    Narrative:      MRI ABDOMEN W WO CONTRAST MRCP    Date of Exam: 2/4/2024 2:40 PM EST    Indication: Per GI. Epigastric pain, pancreatitis. Dilated common bile duct on CT scan.     Comparison: 3/3/2024 abdomen pelvis CT scan    Technique:  Routine multiplanar/multisequence images of the abdomen were obtained with MRCP sequences before and after the uneventful administration of 20 mL Prohance.      Findings:  The  included lower lungs appear clear. No pleural effusion is seen. Right and left lobe liver morphology appears normal. No hepatic lesions are seen. There is moderate fatty liver change. Gallbladder is contracted around numerous small gallstones. No   gallbladder wall thickening is identified but there is a trace amount of fluid in the gallbladder fossa. No significant abnormalities are seen of the spleen, adrenal glands, or kidneys no consistent signal abnormality is seen in the common duct to   suggest common duct stones. Common duct is borderline dilated at 8 mm. Pancreatic duct appears normal. There is no evidence of divisum.     No ascites or adenopathy is appreciated. Postcontrast images appear to show greater than expected enhancement of the gallbladder wall. There also appears to be increased enhancement of the wall of the intrapancreatic portion of the common duct, greater   than seen in the remainder of the duct or the pancreas itself, likely secondary inflammation from pancreatitis. There is no evidence of pathologic solid organ enhancement or other enhancement elsewhere. Bowel loops are normal in caliber.. There appears   to be normal flow signal in the mesenteric vasculature.    MRCP images show borderline dilatation of the common duct, no evidence of common duct stone or stricture, and normal caliber pancreatic duct. No definite sidebranch dilatation is identified.        Impression:      Impression:    1. Contracted gallbladder with numerous small gallstones. Increased enhancement of the gallbladder wall, suggesting mild inflammation despite lack of gallbladder wall thickening.    2. Borderline dilated common duct but no visible common duct stone or stricture. Normal-appearing pancreatic duct.    3. Focal enhancement of the intrapancreatic segment of the common duct, which may reflect the patient's underlying mild pancreatitis. No visible changes of pancreatitis elsewhere.      Electronically Signed:  Kaiden Flores MD    2/4/2024 3:56 PM EST    Workstation ID: [id number]    CT Abdomen Pelvis With Contrast [[id number]] Collected: 02/03/24 1648     Updated: 02/03/24 1658    Narrative:      CT ABDOMEN PELVIS W CONTRAST    Date of Exam: 2/3/2024 4:37 PM EST    Indication: epigastric abd pain, elevated lipase.    Comparison: None available.    Technique: Axial CT images were obtained of the abdomen and pelvis following the uneventful intravenous administration of iodinated contrast. Sagittal and coronal reconstructions were performed.  Automated exposure control and iterative reconstruction   methods were used.        Findings:  Lower Thorax: Lung bases are clear.    Peritoneum: No free air or free fluid.     Appendix: Appendix is well seen and is normal.    Kidneys: No hydronephrosis. 2 mm nonobstructing renal calculus mid left kidney. No focal renal lesions.    Ureters: No obstructing calculi or mass.    Urinary bladder: Urinary bladder has normal appearance on CT given degree of distention.    Liver: No focal hepatic lesions. Mild hepatic steatosis. Mild hepatomegaly.    Gallbladder and bile ducts: Gallbladder is not well distended. There is wall thickening and mild inflammation suspected although this could be secondary to the incomplete distention. Common bile duct is dilated up to 1.3 cm..    Spleen: Spleen is normal size.  No focal splenic lesions.    Adrenal glands: Unremarkable.    Pancreas: No focal masses.  No pancreatic duct dilation. No surrounding inflammation.    Abdominal aorta and Vascular Structures: No aneurysmal dilation. No significant atherosclerotic disease.    Stomach and Bowel: No abnormally dilated loops of bowel.  No significant hiatal hernia.  No significant bowel wall thickening.  Ligament of Treitz has normal anatomic position. Moderate stool burden.    Reproductive Organs: Status post hysterectomy.  Lymph nodes: No pathologically enlarged lymph nodes.    Soft tissues: Unremarkable.    Osseous  structures: No aggressive focal lytic or sclerotic osseous lesions.    Evaluation of bowel and solid organs is limited without contrast administration.      Impression:      The gallbladder is not well-distended there is wall thickening and probable pericholecystic inflammation. Correlate for acute cholecystitis. The common bile duct is dilated up to 1.3 cm. No obstructing stones are seen within the bile duct.  There are no findings of pancreatitis on CT. Pancreatitis is a clinical diagnosis. No complications of pancreatitis on this exam.  Mild hepatomegaly with hepatic steatosis.  Moderate stool burden correlate for constipation.  Nonobstructing small calculus mid left kidney.    Electronically Signed: Milagro Collins MD    2/3/2024 4:56 PM EST    Workstation ID: [id number]    US Liver [[id number]] Collected: 02/03/24 1607     Updated: 02/03/24 1611    Narrative:      US LIVER    Date of Exam: 2/3/2024 3:40 PM EST    Indication: abnormal LFTs.    Comparison: No comparisons available.    Technique: Grayscale and color Doppler ultrasound evaluation of the right upper quadrant was performed.      Findings:  Liver parenchyma is heterogeneous and somewhat echogenic compatible with fatty infiltration. Hepatic vasculature is within normal limits. No focal hepatic mass identified.    Gallbladder is poorly distended and contains echogenic foci compatible with multiple stones. No pericholecystic fluid identified. Common hepatic duct is normal measuring 5 mm.      Impression:      Impression:    1. Cholelithiasis. No evidence of biliary tract obstruction.  2. Hepatic steatosis    Electronically Signed: Anthony Tong MD    2/3/2024 4:09 PM EST    Workstation ID: [id number]            Assessment & Plan     ASSESSMENT:  -Elevated LFT's & lipase   -Cholelithiasis  -LEEANNE   -HTN  -Asthma   -Pruritus consider biliary related  -Constipation per CT  -Leukocytosis  -Family history of colon cancer-maternal grandmother    PLAN:  Patient is a  68-year-old female with history of asthma and hypertension who presented on 2/3 with complaints of itching and abdominal pain.  CT on admission showed probable Ken cholecystic inflammation with wall thickening of the gallbladder, CBD of 1.3 cm without obstructing stone in the bile duct.  No convincing signs of pancreatitis on CT.  LFTs were noted to be elevated with total bilirubin 0.9, alk phos 533, , and .  Lipase 135.    Patient continues to complain of itching and epigastric/chest pain.  Also complains of nausea, but no further vomiting.  MRCP yesterday shows a borderline dilated common bile duct, but no visible CBD stone or stricture.  Gallstones once again noted.  LFTs trending down.  Total bilirubin 0.6 from 0.9, alk phos 446 from 533, AST 63 from 150, and  from 501.  Lipase 129 from 135.  Liver serologies pending.  Await results.  Will plan general surgery consultation for consideration of laparoscopic cholecystectomy.  Consider liver biopsy at the time of lap renny.  Continue ursodiol and hydroxyzine.  Continue supportive care.       Electronically signed by ANGELINA Lopez, 02/05/24, 11:15 AM EST.

## 2024-02-05 NOTE — CONSULTS
This is the short consult note.  Not for the billing purposes.      68-year-old patient presented with itchiness and found to be elevated liver function test.  She has pancreatitis as well.  MRCP showing cholelithiasis.  General surgery is plan for cholecystectomy on February 6.  Continue current management including IV fluid pain management and n.p.o.        Ilya Vazquez MD  Hospitalist  02/05/24   15:10 EST

## 2024-02-05 NOTE — PROGRESS NOTES
[facility] Progress Note    Patient Name: Scarlet Gong  : 1955  MRN: [id number]  Primary Care Physician: Ruben Mejias MD  Date of admission: 2/3/2024     Patient Care Team:  Ruben Mejias MD as PCP - General (Family Medicine)  Hien Camacho MD as Consulting Physician (Cardiology)          Subjective   History Present Illness     Chief Complaint:   Chief Complaint   Patient presents with    Itching     Itching     Itching        ED 2024  Patient is a 68-year-old female history of hyperlipidemia hypertension asthma presents to the ER with complaints of itching all over.  Patient states that she has been itching all over since January.  She states that she has been seen by a dermatologist and was started on a triamcinolone cream.  She states it is not helping.  Patient presents today complaining of persistent and severe itching that she cannot tolerate.  There is no rash hives or urticaria.  She reports no new soaps, laundry detergents lotions perfumes or medications.  No new antibiotics.  Patient reports that she had some severe epigastric abdominal pain yesterday but not today.  No nausea or vomiting.  No discoloration of her skin.  No nausea vomiting no history of liver disease.     ED 2024  Patient agrees with HPI noted above including generalized rash and itching with onset .  She saw dermatologist on Friday and tried the steroid cream that they prescribed without any relief.  She states that her symptoms flared up and she proceeded to the ED yesterday.  She reports that itching and rash have improved overnight.   Per ED notes patient's lipase were very elevated and she was admitted to the hobs unit for GI consultation.  Patient denies any abdominal pain but reports some soreness on the epigastric area.     ED 2024  Patient c/o epigastric pain 3/10. She states that GI group just saw her and informed her of general surgery consult. Patient c/o itching and  requesting hydroxyzine. RN notified.      Review of Systems   Skin:  Positive for itching.     Review of Systems   Constitutional: Negative for fever.   Skin:  Positive for itching and rash.   Gastrointestinal:  Negative for abdominal pain, nausea and vomiting.   All other systems reviewed and are negative.         Personal History     Past Medical History:   Past Medical History:   Diagnosis Date    Asthma 04/27/2016    Hyperlipidemia 03/02/2020    Hypertension 03/02/2020    Obesity (BMI 30-39.9) 03/02/2020    Osteopenia 03/02/2020       Surgical History:      Past Surgical History:   Procedure Laterality Date    CARDIAC CATHETERIZATION N/A 03/04/2020    Procedure: Left Heart Cath and coronary angiogram;  Surgeon: Hien Camacho MD;  Location: [facility] CATH INVASIVE LOCATION;  Service: Cardiovascular;  Laterality: N/A;    CARDIAC CATHETERIZATION N/A 03/04/2020    Procedure: Left ventriculography;  Surgeon: Hien Camacho MD;  Location: [facility] CATH INVASIVE LOCATION;  Service: Cardiovascular;  Laterality: N/A;    CARDIAC CATHETERIZATION N/A 01/30/2023    Procedure: Left Heart Cath and coronary angiogram;  Surgeon: Hien Camacho MD;  Location: [facility] CATH INVASIVE LOCATION;  Service: Cardiovascular;  Laterality: N/A;    CARDIAC CATHETERIZATION N/A 01/30/2023    Procedure: Right Heart Cath;  Surgeon: Hien Camacho MD;  Location: [facility] CATH INVASIVE LOCATION;  Service: Cardiovascular;  Laterality: N/A;    HYSTERECTOMY             Family History: family history includes Colon cancer in her maternal grandmother; Heart disease in her brother and sister; Hypertension in her mother. Otherwise pertinent FHx was reviewed and unremarkable.     Social History:  reports that she has quit smoking. Her smoking use included cigarettes. She has a 48.00 pack-year smoking history. She has been exposed to tobacco smoke. She has never used smokeless tobacco. She reports that she does not drink alcohol and does not use  drugs.      Medications:  Prior to Admission medications    Medication Sig Start Date End Date Taking? Authorizing Provider   albuterol sulfate  (90 Base) MCG/ACT inhaler Inhale 2 puffs Every 6 (Six) Hours As Needed. 6/23/22  Yes Lorraine Goyal MD   aspirin 81 MG EC tablet Take 1 tablet by mouth Daily.   Yes Lorraine Goyal MD   benzonatate (Tessalon Perles) 100 MG capsule Take 1 capsule by mouth 3 (Three) Times a Day As Needed for Cough. 11/17/23  Yes Antoinette Bernstein APRN   calcium carbonate (OS-JEANA) 600 MG tablet Take 1 tablet by mouth Daily.   Yes Lorraine Goyal MD   Cholecalciferol 50 MCG (2000 UT) capsule Take 2,000 Int'l Units by mouth Daily. 12/5/20  Yes Lorraine Goyal MD   ferrous sulfate 325 (65 FE) MG tablet Take 1 tablet by mouth Daily With Breakfast.   Yes Lorraine Goyal MD   gabapentin (NEURONTIN) 100 MG capsule Take 1 capsule by mouth Every Night. 11/30/23  Yes Antoinette Bernstein APRN   hydrALAZINE (APRESOLINE) 50 MG tablet Take 1.5 tablets by mouth 2 (Two) Times a Day. 7/6/22  Yes Lorraine Goyal MD   hydroCHLOROthiazide (HYDRODIURIL) 50 MG tablet Take 1 tablet by mouth Daily. 7/6/22  Yes Lorraine Goyal MD   hydrOXYzine (ATARAX) 25 MG tablet Take 1 tablet by mouth At Night As Needed for Itching.   Yes Lorraine Goyal MD   Potassium 99 MG tablet Take 1 tablet by mouth 2 (Two) Times a Day.   Yes Lorraine Goyal MD   Probiotic Product (Probiotic Blend) capsule Take 1 capsule by mouth Daily. 11/17/23  Yes Antoinette Bernstein APRN   rOPINIRole (REQUIP) 0.5 MG tablet Take 2 tablets by mouth Every Night. Take 1 hour before bedtime.   Yes Lorraine Goyal MD   triamcinolone (KENALOG) 0.1 % cream Apply 1 Application topically to the appropriate area as directed 2 (Two) Times a Day.   Yes Lorraine Goyal MD   zinc gluconate 50 MG tablet Take 1 tablet by mouth Daily. 12/5/20  Yes Lorraine Goyal MD       Allergies:   "  Allergies   Allergen Reactions    Prednisone Rash    Atorvastatin Itching    Lisinopril Cough    Azithromycin Rash     Re-entered from allergy \"Z-Pack\"       Objective   Objective     Vital Signs  Temp:  [97.8 °F (36.6 °C)-99.2 °F (37.3 °C)] 98.3 °F (36.8 °C)  Heart Rate:  [76] 76  Resp:  [15-22] 15  BP: (138-153)/(54-63) 143/54  SpO2:  [94 %-98 %] 94 %  on   ;   Device (Oxygen Therapy): room air  Body mass index is 38.59 kg/m².    Physical Exam  Vitals and nursing note reviewed.   Constitutional:       Appearance: Normal appearance. She is obese.   HENT:      Head: Normocephalic and atraumatic.      Right Ear: External ear normal.      Left Ear: External ear normal.      Nose: Nose normal.      Mouth/Throat:      Mouth: Mucous membranes are moist.      Pharynx: Oropharynx is clear.   Eyes:      Extraocular Movements: Extraocular movements intact.   Cardiovascular:      Rate and Rhythm: Normal rate and regular rhythm.      Pulses: Normal pulses.      Heart sounds: Normal heart sounds.   Pulmonary:      Effort: Pulmonary effort is normal.      Breath sounds: Normal breath sounds.   Abdominal:      General: Abdomen is flat. Bowel sounds are normal.      Palpations: Abdomen is soft.      Tenderness: There is abdominal tenderness.   Musculoskeletal:         General: Normal range of motion.      Cervical back: Normal range of motion.   Skin:     General: Skin is warm.   Neurological:      General: No focal deficit present.      Mental Status: She is alert and oriented to person, place, and time.   Psychiatric:         Mood and Affect: Mood normal.         Behavior: Behavior normal.         Results Review:  I have personally reviewed most recent lab results and radiology images and interpretations and agree with findings, most notably: CMP, C-reactive protein, lipase, CBC, CT abdomen pelvis and ultrasound liver.   .    Results from last 7 days   Lab Units 02/05/24  0324   WBC 10*3/mm3 10.20   HEMOGLOBIN g/dL 11.5* "   HEMATOCRIT % 34.4   PLATELETS 10*3/mm3 321     Results from last 7 days   Lab Units 02/05/24  0324   SODIUM mmol/L 136   POTASSIUM mmol/L 3.4*   CHLORIDE mmol/L 101   CO2 mmol/L 24.0   BUN mg/dL 17   CREATININE mg/dL 0.61   GLUCOSE mg/dL 150*   CALCIUM mg/dL 9.1   ALK PHOS U/L 446*   ALT (SGPT) U/L 326*   AST (SGOT) U/L 63*     Estimated Creatinine Clearance: 96.5 mL/min (by C-G formula based on SCr of 0.61 mg/dL).  Brief Urine Lab Results  (Last result in the past 365 days)        Color   Clarity   Blood   Leuk Est   Nitrite   Protein   CREAT   Urine HCG        11/08/23 0803 Yellow   Clear   Negative   Negative   Negative   Negative                   Microbiology Results (last 10 days)       ** No results found for the last 240 hours. **            ECG/EMG Results (most recent)       Procedure Component Value Units Date/Time    SCANNED - TELEMETRY   [[id number]] Resulted: 02/03/24     Updated: 02/05/24 0710    SCANNED - TELEMETRY   [[id number]] Resulted: 02/03/24     Updated: 02/05/24 0710    SCANNED - TELEMETRY   [[id number]] Resulted: 02/03/24     Updated: 02/05/24 0822    SCANNED - TELEMETRY   [[id number]] Resulted: 02/03/24     Updated: 02/05/24 0919            Results for orders placed during the hospital encounter of 06/22/23    Doppler Ankle Brachial Index Single Level CAR    Interpretation Summary    Right Conclusion: The right JALEN is normal. Normal digital pressures.    Left Conclusion: The left JALEN is normal. Normal digital pressures.      Results for orders placed during the hospital encounter of 11/07/23    Adult Transthoracic Echo Complete W/ Cont if Necessary Per Protocol    Interpretation Summary    Left ventricular systolic function is normal. Calculated left ventricular EF = 59% Left ventricular ejection fraction appears to be 56 - 60%.    Left ventricular diastolic function is consistent with (grade I) impaired relaxation.    Estimated right ventricular systolic pressure from tricuspid regurgitation  is normal (<35 mmHg).    No significant valvular abnormalities.      MRI abdomen w wo contrast mrcp    Result Date: 2/4/2024  Impression: 1. Contracted gallbladder with numerous small gallstones. Increased enhancement of the gallbladder wall, suggesting mild inflammation despite lack of gallbladder wall thickening. 2. Borderline dilated common duct but no visible common duct stone or stricture. Normal-appearing pancreatic duct. 3. Focal enhancement of the intrapancreatic segment of the common duct, which may reflect the patient's underlying mild pancreatitis. No visible changes of pancreatitis elsewhere. Electronically Signed: Kaiden Flores MD  2/4/2024 3:56 PM EST  Workstation ID: [id number]    CT Abdomen Pelvis With Contrast    Result Date: 2/3/2024  The gallbladder is not well-distended there is wall thickening and probable pericholecystic inflammation. Correlate for acute cholecystitis. The common bile duct is dilated up to 1.3 cm. No obstructing stones are seen within the bile duct. There are no findings of pancreatitis on CT. Pancreatitis is a clinical diagnosis. No complications of pancreatitis on this exam. Mild hepatomegaly with hepatic steatosis. Moderate stool burden correlate for constipation. Nonobstructing small calculus mid left kidney. Electronically Signed: Milagro Collins MD  2/3/2024 4:56 PM EST  Workstation ID: [id number]    US Liver    Result Date: 2/3/2024  Impression: 1. Cholelithiasis. No evidence of biliary tract obstruction. 2. Hepatic steatosis Electronically Signed: Anthony Tong MD  2/3/2024 4:09 PM EST  Workstation ID: [id number]       Estimated Creatinine Clearance: 96.5 mL/min (by C-G formula based on SCr of 0.61 mg/dL).    Assessment & Plan   Assessment/Plan       Active Hospital Problems    Diagnosis  POA    **Pancreatitis [K85.90]  Yes      Resolved Hospital Problems   No resolved problems to display.            Elevated LFTs        Lab Results   Component Value Date     WBC 11.20 (H)  "02/04/2024      (H) 02/03/2024      (H) 02/03/2024     ALKPHOS 699 (H) 02/03/2024     BILITOT 1.5 (H) 02/03/2024     LIPASE 135 (H) 02/04/2024   -Lipase 1065, 135 and 129 today  -CMP and CBC generally unremarkable  -CT of abdomen and pelvis: Showed possible pericholecystic inflammation versus acute cholecystitis.  Common bile duct is dilated up to 1.3 cm.  No obstructive stones are seen within the bile duct.  No findings of pancreatitis on CT.  Mild hepatomegaly with hepatic steatosis.  Moderate stool burden and no obstructing small calculus mid left kidney  -In the ED patient received Pepcid  -GI was consulted and recommended MRCP, ordered by ED provider  -Ativan x1 ordered prior to MRCP as patient reports she is claustrophobic.   -MRCP showed \"1. Contracted gallbladder with numerous small gallstones. Increased enhancement of the gallbladder wall, suggesting mild inflammation despite lack of gallbladder wall thickening. 2. Borderline dilated common duct but no visible common duct stone or stricture. Normal-appearing pancreatic duct. 3. Focal enhancement of the intrapancreatic segment of the common duct, which may reflect the patient's underlying mild pancreatitis. No visible changes of pancreatitis elsewhere.   -C-reactive protein 1.81, 3.10  -General surgeon was consulted by GI team  -General surgeon recommended cholecystectomy in am  -Monitor patient's progress and labs while admitted   -NPO at midnight  -Patient has been in observation for the past 2 midnights, hospitalist consult      Pruritus   -Seen by dermatology recently and tried steroid cream without any relief.   -In the ED patient received Benadryl and Pepcid  -Per GIs notes, possibly biliary related  -Continue hydroxyzine and ursodiol per GI's recommendations      Hypertension  -Moderately controlled   BP Readings from Last 1 Encounters:   02/05/24 143/54   - Continue hydralazine and HCTZ  - Monitor while admitted      Restless leg " syndrome  -Continue Requip     Obesity  -BMI 38.59  -Lifestyle modification             VTE Prophylaxis -   Mechanical Order History:        Ordered        02/05/24 0916  Place Sequential Compression Device on Patient in Pre-Op  Once            02/03/24 2226  Place Sequential Compression Device  Once            02/03/24 2226  Maintain Sequential Compression Device  Continuous                          Pharmalogical Order History:       None            CODE STATUS:    Code Status and Medical Interventions:   Ordered at: 02/03/24 2124     Level Of Support Discussed With:    Patient     Code Status (Patient has no pulse and is not breathing):    CPR (Attempt to Resuscitate)     Medical Interventions (Patient has pulse or is breathing):    Full Support       This patient has been examined wearing personal protective equipment.     I discussed the patient's findings and my recommendations with patient and nursing staff.      Signature:Electronically signed by ANGELINA Olivares, 02/05/24, 10:22 AM EST.

## 2024-02-05 NOTE — CONSULTS
Inpatient General Surgery Consult  Consult performed by: Tom Hernandez MD  Consult ordered by: Tess Hoffman APRN  Reason for consult: Gallstone pancreatitis          General Surgery Consult Note      Name: Scarlet Gong ADMIT: 2/3/2024   : 1955  PCP: Ruben Mejias MD    MRN: [id number] LOS: 0 days   AGE/SEX: 68 y.o. female  ROOM: [facility]/1   [facility]      Patient Care Team:  Ruben Mejias MD as PCP - General (Family Medicine)  Hien Camacho MD as Consulting Physician (Cardiology)  Chief Complaint   Patient presents with    Itching       Subjective   68-year-old lady whose chief complaint was initially itching and found to have elevated LFTs.  She says that intermittently for months if not longer she has had variable epigastric abdominal pain.  She says it happens after she eats that sharp pain that radiates through to her back.  He is also had dark discolored urine and some chalky stools.  On arrival she was found to have pancreatitis with an elevated lipase as well as elevated liver enzymes including her bilirubin AST ALT and alkaline phosphatase.  She had imaging including an MRCP which not show any definitive common bile duct stones but did have cholelithiasis.  With a working diagnosis of gallstone pancreatitis I was asked to see her for consideration for cholecystectomy.  She does states she is feeling somewhat better.    Past Medical History:   Diagnosis Date    Asthma 2016    Hyperlipidemia 2020    Hypertension 2020    Obesity (BMI 30-39.9) 2020    Osteopenia 2020     Past Surgical History:   Procedure Laterality Date    CARDIAC CATHETERIZATION N/A 2020    Procedure: Left Heart Cath and coronary angiogram;  Surgeon: Hien Camacho MD;  Location: [facility] CATH INVASIVE LOCATION;  Service: Cardiovascular;  Laterality: N/A;    CARDIAC CATHETERIZATION N/A 2020    Procedure: Left ventriculography;  Surgeon: Hien Camacho MD;  Location: [facility]  CATH INVASIVE LOCATION;  Service: Cardiovascular;  Laterality: N/A;    CARDIAC CATHETERIZATION N/A 01/30/2023    Procedure: Left Heart Cath and coronary angiogram;  Surgeon: Hien Camacho MD;  Location: [facility] CATH INVASIVE LOCATION;  Service: Cardiovascular;  Laterality: N/A;    CARDIAC CATHETERIZATION N/A 01/30/2023    Procedure: Right Heart Cath;  Surgeon: Hien Camacho MD;  Location: [facility] CATH INVASIVE LOCATION;  Service: Cardiovascular;  Laterality: N/A;    HYSTERECTOMY       Family History   Problem Relation Age of Onset    Hypertension Mother     Heart disease Sister     Heart disease Brother     Colon cancer Maternal Grandmother        Social History     Tobacco Use    Smoking status: Former     Packs/day: 1.50     Years: 32.00     Additional pack years: 0.00     Total pack years: 48.00     Types: Cigarettes     Passive exposure: Past    Smokeless tobacco: Never   Vaping Use    Vaping Use: Never used   Substance Use Topics    Alcohol use: Never    Drug use: Never     Medications Prior to Admission   Medication Sig Dispense Refill Last Dose    albuterol sulfate  (90 Base) MCG/ACT inhaler Inhale 2 puffs Every 6 (Six) Hours As Needed.       aspirin 81 MG EC tablet Take 1 tablet by mouth Daily.       benzonatate (Tessalon Perles) 100 MG capsule Take 1 capsule by mouth 3 (Three) Times a Day As Needed for Cough. 30 capsule 1     calcium carbonate (OS-JEANA) 600 MG tablet Take 1 tablet by mouth Daily.       Cholecalciferol 50 MCG (2000 UT) capsule Take 2,000 Int'l Units by mouth Daily.       ferrous sulfate 325 (65 FE) MG tablet Take 1 tablet by mouth Daily With Breakfast.       gabapentin (NEURONTIN) 100 MG capsule Take 1 capsule by mouth Every Night. 30 capsule 0     hydrALAZINE (APRESOLINE) 50 MG tablet Take 1.5 tablets by mouth 2 (Two) Times a Day.       hydroCHLOROthiazide (HYDRODIURIL) 50 MG tablet Take 1 tablet by mouth Daily.       hydrOXYzine (ATARAX) 25 MG tablet Take 1 tablet by mouth At  Night As Needed for Itching.       Potassium 99 MG tablet Take 1 tablet by mouth 2 (Two) Times a Day.       Probiotic Product (Probiotic Blend) capsule Take 1 capsule by mouth Daily. 90 capsule 3     rOPINIRole (REQUIP) 0.5 MG tablet Take 2 tablets by mouth Every Night. Take 1 hour before bedtime.       triamcinolone (KENALOG) 0.1 % cream Apply 1 Application topically to the appropriate area as directed 2 (Two) Times a Day.       zinc gluconate 50 MG tablet Take 1 tablet by mouth Daily.        ferrous sulfate, 324 mg, Oral, Daily With Breakfast  gabapentin, 100 mg, Oral, Nightly  hydrALAZINE, 75 mg, Oral, BID  hydroCHLOROthiazide, 50 mg, Oral, Daily  polyethylene glycol, 17 g, Oral, Daily  rOPINIRole, 1 mg, Oral, Nightly  senna-docusate sodium, 2 tablet, Oral, BID  sodium chloride, 10 mL, Intravenous, Q12H  ursodiol, 500 mg, Oral, Q12H      sodium chloride, 100 mL/hr, Last Rate: 100 mL/hr (02/04/24 1654)        acetaminophen    albuterol    benzonatate    senna-docusate sodium **AND** [DISCONTINUED] polyethylene glycol **AND** bisacodyl **AND** bisacodyl    Calcium Replacement - Follow Nurse / BPA Driven Protocol    hydrOXYzine    Magnesium Standard Dose Replacement - Follow Nurse / BPA Driven Protocol    melatonin    ondansetron    Phosphorus Replacement - Follow Nurse / BPA Driven Protocol    Potassium Replacement - Follow Nurse / BPA Driven Protocol    [COMPLETED] Insert Peripheral IV **AND** sodium chloride    sodium chloride    sodium chloride  Prednisone, Atorvastatin, Lisinopril, and Azithromycin    Review of Systems   Constitutional:  Negative for chills and fever.   HENT:  Negative for sore throat and trouble swallowing.    Eyes:  Negative for blurred vision and double vision.   Respiratory:  Negative for cough and shortness of breath.    Cardiovascular:  Negative for chest pain and leg swelling.   Gastrointestinal:  Positive for abdominal pain and nausea. Negative for abdominal distention, blood in stool,  constipation, diarrhea and vomiting.   Genitourinary:  Negative for dysuria and hematuria.   Neurological:  Negative for dizziness and confusion.   Psychiatric/Behavioral:  Negative for agitation and depressed mood.         Objective     Vital Signs and Labs:  Vital Signs Patient Vitals for the past 24 hrs:   BP Temp Temp src Pulse Resp SpO2   02/05/24 1012 145/54 95.9 °F (35.5 °C) Oral 85 19 97 %   02/05/24 0630 143/54 98.3 °F (36.8 °C) Oral -- 15 --   02/05/24 0300 138/62 98.1 °F (36.7 °C) Oral -- 16 94 %   02/04/24 2300 153/63 98.3 °F (36.8 °C) Oral -- 16 95 %   02/04/24 1806 141/62 99.2 °F (37.3 °C) Oral -- 22 97 %   02/04/24 1551 141/62 97.8 °F (36.6 °C) Oral 76 19 98 %       Physical Exam:  Physical Exam  Constitutional:       Appearance: Normal appearance.   HENT:      Head: Normocephalic and atraumatic.   Cardiovascular:      Rate and Rhythm: Normal rate.   Pulmonary:      Effort: Pulmonary effort is normal. No respiratory distress.   Abdominal:      General: There is no distension.      Palpations: Abdomen is soft.      Tenderness: There is no abdominal tenderness.      Comments: Healed low midline incision   Skin:     General: Skin is warm and dry.   Neurological:      General: No focal deficit present.      Mental Status: She is alert. Mental status is at baseline.   Psychiatric:         Mood and Affect: Mood normal.         Behavior: Behavior normal.         CBC    Results from last 7 days   Lab Units 02/05/24  0324 02/04/24  0557 02/03/24  1338   WBC 10*3/mm3 10.20 11.20* 10.10   HEMOGLOBIN g/dL 11.5* 12.1 13.1   PLATELETS 10*3/mm3 321 371 422     CMP   Results from last 7 days   Lab Units 02/05/24  0324 02/04/24  0758 02/03/24  1338   SODIUM mmol/L 136 138 140   POTASSIUM mmol/L 3.4* 3.8 3.5   CHLORIDE mmol/L 101 102 100   CO2 mmol/L 24.0 24.0 25.0   BUN mg/dL 17 20 18   CREATININE mg/dL 0.61 0.59 0.67   GLUCOSE mg/dL 150* 133* 98   ALBUMIN g/dL 3.5 4.0 4.4   BILIRUBIN mg/dL 0.6 0.9 1.5*   ALK PHOS U/L  446* 533* 699*   AST (SGOT) U/L 63* 150* 415*   ALT (SGPT) U/L 326* 501* 798*   LIPASE U/L 129* 135* 1,065*     Radiology(recent) MRI abdomen w wo contrast mrcp    Result Date: 2/4/2024  Impression: 1. Contracted gallbladder with numerous small gallstones. Increased enhancement of the gallbladder wall, suggesting mild inflammation despite lack of gallbladder wall thickening. 2. Borderline dilated common duct but no visible common duct stone or stricture. Normal-appearing pancreatic duct. 3. Focal enhancement of the intrapancreatic segment of the common duct, which may reflect the patient's underlying mild pancreatitis. No visible changes of pancreatitis elsewhere. Electronically Signed: Kaiden Flores MD  2/4/2024 3:56 PM EST  Workstation ID: [id number]    CT Abdomen Pelvis With Contrast    Result Date: 2/3/2024  The gallbladder is not well-distended there is wall thickening and probable pericholecystic inflammation. Correlate for acute cholecystitis. The common bile duct is dilated up to 1.3 cm. No obstructing stones are seen within the bile duct. There are no findings of pancreatitis on CT. Pancreatitis is a clinical diagnosis. No complications of pancreatitis on this exam. Mild hepatomegaly with hepatic steatosis. Moderate stool burden correlate for constipation. Nonobstructing small calculus mid left kidney. Electronically Signed: Milagro Collins MD  2/3/2024 4:56 PM EST  Workstation ID: [id number]    US Liver    Result Date: 2/3/2024  Impression: 1. Cholelithiasis. No evidence of biliary tract obstruction. 2. Hepatic steatosis Electronically Signed: Anthony Tong MD  2/3/2024 4:09 PM EST  Workstation ID: [id number]     I reviewed the patient's new clinical results.  I reviewed the patient's new imaging results and agree with the interpretation.    Assessment & Plan       Pancreatitis      68 y.o. female admitted with itching and intermittent epigastric abdominal pain labs and workup concerning for gallstone pancreatitis.  I  counseled her about my role in her care.  We talked about pancreatitis and the indications for cholecystectomy.  We talked about the steps of the operation anticipated cover the possible complications.  Talked about open surgery and bile duct injury.  We talked about life without a gallbladder.  After our discussion she understands the risk of the surgery and does want to move forward with cholecystectomy.  Will need to be n.p.o. after midnight.      This note was created using Dragon Voice Recognition software.    Tom Hernandez MD  02/05/24  12:09 EST

## 2024-02-06 ENCOUNTER — APPOINTMENT (OUTPATIENT)
Dept: GENERAL RADIOLOGY | Facility: HOSPITAL | Age: 69
DRG: 418 | End: 2024-02-06
Payer: MEDICARE

## 2024-02-06 ENCOUNTER — ANESTHESIA (OUTPATIENT)
Dept: PERIOP | Facility: HOSPITAL | Age: 69
End: 2024-02-06
Payer: MEDICARE

## 2024-02-06 ENCOUNTER — ANESTHESIA EVENT (OUTPATIENT)
Dept: PERIOP | Facility: HOSPITAL | Age: 69
End: 2024-02-06
Payer: MEDICARE

## 2024-02-06 ENCOUNTER — INPATIENT HOSPITAL (OUTPATIENT)
Dept: URBAN - METROPOLITAN AREA HOSPITAL 84 | Facility: HOSPITAL | Age: 69
End: 2024-02-06
Payer: MEDICARE

## 2024-02-06 DIAGNOSIS — R93.3 ABNORMAL FINDINGS ON DIAGNOSTIC IMAGING OF OTHER PARTS OF DI: ICD-10-CM

## 2024-02-06 DIAGNOSIS — K80.20 CALCULUS OF GALLBLADDER WITHOUT CHOLECYSTITIS WITHOUT OBSTRU: ICD-10-CM

## 2024-02-06 DIAGNOSIS — D72.829 ELEVATED WHITE BLOOD CELL COUNT, UNSPECIFIED: ICD-10-CM

## 2024-02-06 DIAGNOSIS — L29.8 OTHER PRURITUS: ICD-10-CM

## 2024-02-06 DIAGNOSIS — R10.13 EPIGASTRIC PAIN: ICD-10-CM

## 2024-02-06 DIAGNOSIS — D50.9 IRON DEFICIENCY ANEMIA, UNSPECIFIED: ICD-10-CM

## 2024-02-06 DIAGNOSIS — K59.00 CONSTIPATION, UNSPECIFIED: ICD-10-CM

## 2024-02-06 DIAGNOSIS — R94.5 ABNORMAL RESULTS OF LIVER FUNCTION STUDIES: ICD-10-CM

## 2024-02-06 PROBLEM — K85.90 ACUTE PANCREATITIS: Status: ACTIVE | Noted: 2024-02-06

## 2024-02-06 LAB
ALBUMIN SERPL-MCNC: 3.8 G/DL (ref 3.5–5.2)
ALBUMIN/GLOB SERPL: 1.2 G/DL
ALP SERPL-CCNC: 414 U/L (ref 39–117)
ALT SERPL W P-5'-P-CCNC: 243 U/L (ref 1–33)
ANION GAP SERPL CALCULATED.3IONS-SCNC: 12 MMOL/L (ref 5–15)
AST SERPL-CCNC: 42 U/L (ref 1–32)
BASOPHILS # BLD AUTO: 0.1 10*3/MM3 (ref 0–0.2)
BASOPHILS NFR BLD AUTO: 1 % (ref 0–1.5)
BILIRUB SERPL-MCNC: 0.5 MG/DL (ref 0–1.2)
BUN SERPL-MCNC: 14 MG/DL (ref 8–23)
BUN/CREAT SERPL: 23.7 (ref 7–25)
CALCIUM SPEC-SCNC: 9.7 MG/DL (ref 8.6–10.5)
CHLORIDE SERPL-SCNC: 103 MMOL/L (ref 98–107)
CO2 SERPL-SCNC: 24 MMOL/L (ref 22–29)
CREAT SERPL-MCNC: 0.59 MG/DL (ref 0.57–1)
CRP SERPL-MCNC: 2.05 MG/DL (ref 0–0.5)
DEPRECATED RDW RBC AUTO: 50.8 FL (ref 37–54)
EGFRCR SERPLBLD CKD-EPI 2021: 98.9 ML/MIN/1.73
EOSINOPHIL # BLD AUTO: 0.3 10*3/MM3 (ref 0–0.4)
EOSINOPHIL NFR BLD AUTO: 2.9 % (ref 0.3–6.2)
ERYTHROCYTE [DISTWIDTH] IN BLOOD BY AUTOMATED COUNT: 15.8 % (ref 12.3–15.4)
GLOBULIN UR ELPH-MCNC: 3.3 GM/DL
GLUCOSE SERPL-MCNC: 130 MG/DL (ref 65–99)
HCT VFR BLD AUTO: 38.1 % (ref 34–46.6)
HGB BLD-MCNC: 12.4 G/DL (ref 12–15.9)
LYMPHOCYTES # BLD AUTO: 3 10*3/MM3 (ref 0.7–3.1)
LYMPHOCYTES NFR BLD AUTO: 32.5 % (ref 19.6–45.3)
MCH RBC QN AUTO: 28.1 PG (ref 26.6–33)
MCHC RBC AUTO-ENTMCNC: 32.7 G/DL (ref 31.5–35.7)
MCV RBC AUTO: 86.1 FL (ref 79–97)
MITOCHONDRIA M2 IGG SER-ACNC: <20 UNITS (ref 0–20)
MONOCYTES # BLD AUTO: 0.6 10*3/MM3 (ref 0.1–0.9)
MONOCYTES NFR BLD AUTO: 6.2 % (ref 5–12)
NEUTROPHILS NFR BLD AUTO: 5.3 10*3/MM3 (ref 1.7–7)
NEUTROPHILS NFR BLD AUTO: 57.4 % (ref 42.7–76)
NRBC BLD AUTO-RTO: 0 /100 WBC (ref 0–0.2)
PLATELET # BLD AUTO: 349 10*3/MM3 (ref 140–450)
PMV BLD AUTO: 8 FL (ref 6–12)
POTASSIUM SERPL-SCNC: 4.3 MMOL/L (ref 3.5–5.2)
PROT SERPL-MCNC: 7.1 G/DL (ref 6–8.5)
RBC # BLD AUTO: 4.42 10*6/MM3 (ref 3.77–5.28)
SMA IGG SER-ACNC: 14 UNITS (ref 0–19)
SODIUM SERPL-SCNC: 139 MMOL/L (ref 136–145)
WBC NRBC COR # BLD AUTO: 9.3 10*3/MM3 (ref 3.4–10.8)

## 2024-02-06 PROCEDURE — 88313 SPECIAL STAINS GROUP 2: CPT | Performed by: SURGERY

## 2024-02-06 PROCEDURE — 0 IOHEXOL 300 MG/ML SOLUTION: Performed by: SURGERY

## 2024-02-06 PROCEDURE — 86140 C-REACTIVE PROTEIN: CPT | Performed by: NURSE PRACTITIONER

## 2024-02-06 PROCEDURE — 99232 SBSQ HOSP IP/OBS MODERATE 35: CPT | Performed by: NURSE PRACTITIONER

## 2024-02-06 PROCEDURE — 25010000002 CEFAZOLIN PER 500 MG: Performed by: SURGERY

## 2024-02-06 PROCEDURE — 25010000002 BUPIVACAINE (PF) 0.25 % SOLUTION: Performed by: SURGERY

## 2024-02-06 PROCEDURE — 0FB04ZX EXCISION OF LIVER, PERCUTANEOUS ENDOSCOPIC APPROACH, DIAGNOSTIC: ICD-10-PCS | Performed by: SURGERY

## 2024-02-06 PROCEDURE — 74300 X-RAY BILE DUCTS/PANCREAS: CPT

## 2024-02-06 PROCEDURE — BF121ZZ FLUOROSCOPY OF GALLBLADDER USING LOW OSMOLAR CONTRAST: ICD-10-PCS | Performed by: SURGERY

## 2024-02-06 PROCEDURE — 88304 TISSUE EXAM BY PATHOLOGIST: CPT | Performed by: SURGERY

## 2024-02-06 PROCEDURE — 25810000003 SODIUM CHLORIDE 0.9 % SOLUTION: Performed by: NURSE PRACTITIONER

## 2024-02-06 PROCEDURE — 88307 TISSUE EXAM BY PATHOLOGIST: CPT | Performed by: SURGERY

## 2024-02-06 PROCEDURE — 47563 LAPARO CHOLECYSTECTOMY/GRAPH: CPT | Performed by: SURGERY

## 2024-02-06 PROCEDURE — 25010000002 DEXAMETHASONE PER 1 MG: Performed by: NURSE ANESTHETIST, CERTIFIED REGISTERED

## 2024-02-06 PROCEDURE — 85025 COMPLETE CBC W/AUTO DIFF WBC: CPT | Performed by: NURSE PRACTITIONER

## 2024-02-06 PROCEDURE — 25010000002 HYDROMORPHONE 1 MG/ML SOLUTION: Performed by: NURSE ANESTHETIST, CERTIFIED REGISTERED

## 2024-02-06 PROCEDURE — 80053 COMPREHEN METABOLIC PANEL: CPT | Performed by: NURSE PRACTITIONER

## 2024-02-06 PROCEDURE — 25010000002 SUGAMMADEX 200 MG/2ML SOLUTION: Performed by: NURSE ANESTHETIST, CERTIFIED REGISTERED

## 2024-02-06 PROCEDURE — 25010000002 FENTANYL CITRATE (PF) 100 MCG/2ML SOLUTION: Performed by: NURSE ANESTHETIST, CERTIFIED REGISTERED

## 2024-02-06 PROCEDURE — 25810000003 SODIUM CHLORIDE PER 500 ML: Performed by: SURGERY

## 2024-02-06 PROCEDURE — 0FT44ZZ RESECTION OF GALLBLADDER, PERCUTANEOUS ENDOSCOPIC APPROACH: ICD-10-PCS | Performed by: SURGERY

## 2024-02-06 PROCEDURE — 25010000002 ONDANSETRON PER 1 MG: Performed by: NURSE ANESTHETIST, CERTIFIED REGISTERED

## 2024-02-06 PROCEDURE — 25010000002 PROPOFOL 200 MG/20ML EMULSION: Performed by: NURSE ANESTHETIST, CERTIFIED REGISTERED

## 2024-02-06 PROCEDURE — 47379 UNLISTED LAPS PX LIVER: CPT | Performed by: SURGERY

## 2024-02-06 DEVICE — CLIPAPPLR M/ ENDO LIGAMAX5 5MM 33CM MD/LG: Type: IMPLANTABLE DEVICE | Site: ABDOMEN | Status: FUNCTIONAL

## 2024-02-06 RX ORDER — OXYCODONE HYDROCHLORIDE 5 MG/1
5 TABLET ORAL EVERY 4 HOURS PRN
Status: DISCONTINUED | OUTPATIENT
Start: 2024-02-06 | End: 2024-02-07 | Stop reason: HOSPADM

## 2024-02-06 RX ORDER — LIDOCAINE HYDROCHLORIDE 20 MG/ML
INJECTION, SOLUTION EPIDURAL; INFILTRATION; INTRACAUDAL; PERINEURAL AS NEEDED
Status: DISCONTINUED | OUTPATIENT
Start: 2024-02-06 | End: 2024-02-06 | Stop reason: SURG

## 2024-02-06 RX ORDER — PROPOFOL 10 MG/ML
INJECTION, EMULSION INTRAVENOUS AS NEEDED
Status: DISCONTINUED | OUTPATIENT
Start: 2024-02-06 | End: 2024-02-06 | Stop reason: SURG

## 2024-02-06 RX ORDER — IPRATROPIUM BROMIDE AND ALBUTEROL SULFATE 2.5; .5 MG/3ML; MG/3ML
3 SOLUTION RESPIRATORY (INHALATION) ONCE AS NEEDED
Status: DISCONTINUED | OUTPATIENT
Start: 2024-02-06 | End: 2024-02-06 | Stop reason: HOSPADM

## 2024-02-06 RX ORDER — EPHEDRINE SULFATE 5 MG/ML
5 INJECTION INTRAVENOUS ONCE AS NEEDED
Status: DISCONTINUED | OUTPATIENT
Start: 2024-02-06 | End: 2024-02-06 | Stop reason: HOSPADM

## 2024-02-06 RX ORDER — FLUMAZENIL 0.1 MG/ML
0.2 INJECTION INTRAVENOUS AS NEEDED
Status: DISCONTINUED | OUTPATIENT
Start: 2024-02-06 | End: 2024-02-06 | Stop reason: HOSPADM

## 2024-02-06 RX ORDER — DEXAMETHASONE SODIUM PHOSPHATE 4 MG/ML
INJECTION, SOLUTION INTRA-ARTICULAR; INTRALESIONAL; INTRAMUSCULAR; INTRAVENOUS; SOFT TISSUE AS NEEDED
Status: DISCONTINUED | OUTPATIENT
Start: 2024-02-06 | End: 2024-02-06 | Stop reason: SURG

## 2024-02-06 RX ORDER — HYDRALAZINE HYDROCHLORIDE 20 MG/ML
5 INJECTION INTRAMUSCULAR; INTRAVENOUS
Status: DISCONTINUED | OUTPATIENT
Start: 2024-02-06 | End: 2024-02-06 | Stop reason: HOSPADM

## 2024-02-06 RX ORDER — ONDANSETRON 2 MG/ML
INJECTION INTRAMUSCULAR; INTRAVENOUS AS NEEDED
Status: DISCONTINUED | OUTPATIENT
Start: 2024-02-06 | End: 2024-02-06 | Stop reason: SURG

## 2024-02-06 RX ORDER — OXYCODONE HYDROCHLORIDE 5 MG/1
5 TABLET ORAL ONCE AS NEEDED
Status: COMPLETED | OUTPATIENT
Start: 2024-02-06 | End: 2024-02-06

## 2024-02-06 RX ORDER — ACETAMINOPHEN 325 MG/1
650 TABLET ORAL ONCE AS NEEDED
Status: DISCONTINUED | OUTPATIENT
Start: 2024-02-06 | End: 2024-02-06 | Stop reason: HOSPADM

## 2024-02-06 RX ORDER — ROCURONIUM BROMIDE 10 MG/ML
INJECTION, SOLUTION INTRAVENOUS AS NEEDED
Status: DISCONTINUED | OUTPATIENT
Start: 2024-02-06 | End: 2024-02-06 | Stop reason: SURG

## 2024-02-06 RX ORDER — FENTANYL CITRATE 50 UG/ML
50 INJECTION, SOLUTION INTRAMUSCULAR; INTRAVENOUS
Status: DISCONTINUED | OUTPATIENT
Start: 2024-02-06 | End: 2024-02-06 | Stop reason: HOSPADM

## 2024-02-06 RX ORDER — LABETALOL HYDROCHLORIDE 5 MG/ML
5 INJECTION, SOLUTION INTRAVENOUS
Status: DISCONTINUED | OUTPATIENT
Start: 2024-02-06 | End: 2024-02-06 | Stop reason: HOSPADM

## 2024-02-06 RX ORDER — BUPIVACAINE HYDROCHLORIDE 2.5 MG/ML
INJECTION, SOLUTION EPIDURAL; INFILTRATION; INTRACAUDAL AS NEEDED
Status: DISCONTINUED | OUTPATIENT
Start: 2024-02-06 | End: 2024-02-06 | Stop reason: HOSPADM

## 2024-02-06 RX ORDER — FENTANYL CITRATE 50 UG/ML
INJECTION, SOLUTION INTRAMUSCULAR; INTRAVENOUS AS NEEDED
Status: DISCONTINUED | OUTPATIENT
Start: 2024-02-06 | End: 2024-02-06 | Stop reason: SURG

## 2024-02-06 RX ORDER — NALOXONE HCL 0.4 MG/ML
0.4 VIAL (ML) INJECTION AS NEEDED
Status: DISCONTINUED | OUTPATIENT
Start: 2024-02-06 | End: 2024-02-06 | Stop reason: HOSPADM

## 2024-02-06 RX ORDER — ONDANSETRON 2 MG/ML
4 INJECTION INTRAMUSCULAR; INTRAVENOUS ONCE AS NEEDED
Status: DISCONTINUED | OUTPATIENT
Start: 2024-02-06 | End: 2024-02-06 | Stop reason: HOSPADM

## 2024-02-06 RX ORDER — SODIUM CHLORIDE 9 MG/ML
INJECTION, SOLUTION INTRAVENOUS AS NEEDED
Status: DISCONTINUED | OUTPATIENT
Start: 2024-02-06 | End: 2024-02-06 | Stop reason: HOSPADM

## 2024-02-06 RX ADMIN — HYDROXYZINE HYDROCHLORIDE 25 MG: 25 TABLET, FILM COATED ORAL at 08:37

## 2024-02-06 RX ADMIN — HYDROXYZINE HYDROCHLORIDE 25 MG: 25 TABLET, FILM COATED ORAL at 20:44

## 2024-02-06 RX ADMIN — Medication 10 ML: at 20:38

## 2024-02-06 RX ADMIN — LIDOCAINE HYDROCHLORIDE 100 MG: 20 INJECTION, SOLUTION EPIDURAL; INFILTRATION; INTRACAUDAL; PERINEURAL at 14:37

## 2024-02-06 RX ADMIN — FENTANYL CITRATE 50 MCG: 50 INJECTION, SOLUTION INTRAMUSCULAR; INTRAVENOUS at 15:34

## 2024-02-06 RX ADMIN — OXYCODONE HYDROCHLORIDE 5 MG: 5 TABLET ORAL at 16:30

## 2024-02-06 RX ADMIN — FENTANYL CITRATE 50 MCG: 50 INJECTION, SOLUTION INTRAMUSCULAR; INTRAVENOUS at 14:45

## 2024-02-06 RX ADMIN — HYDRALAZINE HYDROCHLORIDE 75 MG: 25 TABLET ORAL at 17:36

## 2024-02-06 RX ADMIN — HYDROMORPHONE HYDROCHLORIDE 0.5 MG: 1 INJECTION, SOLUTION INTRAMUSCULAR; INTRAVENOUS; SUBCUTANEOUS at 16:25

## 2024-02-06 RX ADMIN — DEXAMETHASONE SODIUM PHOSPHATE 4 MG: 4 INJECTION, SOLUTION INTRAMUSCULAR; INTRAVENOUS at 14:45

## 2024-02-06 RX ADMIN — FERROUS SULFATE TAB EC 324 MG (65 MG FE EQUIVALENT) 324 MG: 324 (65 FE) TABLET DELAYED RESPONSE at 08:37

## 2024-02-06 RX ADMIN — SODIUM CHLORIDE 100 ML/HR: 9 INJECTION, SOLUTION INTRAVENOUS at 09:45

## 2024-02-06 RX ADMIN — ONDANSETRON 4 MG: 2 INJECTION INTRAMUSCULAR; INTRAVENOUS at 14:58

## 2024-02-06 RX ADMIN — SUGAMMADEX 200 MG: 100 INJECTION, SOLUTION INTRAVENOUS at 15:45

## 2024-02-06 RX ADMIN — GABAPENTIN 100 MG: 100 CAPSULE ORAL at 20:38

## 2024-02-06 RX ADMIN — HYDRALAZINE HYDROCHLORIDE 75 MG: 25 TABLET ORAL at 08:38

## 2024-02-06 RX ADMIN — ROPINIROLE HYDROCHLORIDE 1 MG: 1 TABLET, FILM COATED ORAL at 20:38

## 2024-02-06 RX ADMIN — URSODIOL 500 MG: 500 TABLET, FILM COATED ORAL at 08:37

## 2024-02-06 RX ADMIN — PROPOFOL 150 MG: 10 INJECTION, EMULSION INTRAVENOUS at 14:37

## 2024-02-06 RX ADMIN — Medication 10 ML: at 08:38

## 2024-02-06 RX ADMIN — ROCURONIUM BROMIDE 50 MG: 50 INJECTION, SOLUTION INTRAVENOUS at 14:37

## 2024-02-06 RX ADMIN — HYDROCHLOROTHIAZIDE 50 MG: 25 TABLET ORAL at 08:37

## 2024-02-06 RX ADMIN — CEFAZOLIN 2000 MG: 2 INJECTION, POWDER, FOR SOLUTION INTRAMUSCULAR; INTRAVENOUS at 14:44

## 2024-02-06 RX ADMIN — URSODIOL 500 MG: 500 TABLET, FILM COATED ORAL at 20:38

## 2024-02-06 NOTE — PLAN OF CARE
Problem: Adult Inpatient Plan of Care  Goal: Absence of Hospital-Acquired Illness or Injury  Intervention: Identify and Manage Fall Risk  Recent Flowsheet Documentation  Taken 2/6/2024 1208 by Rhonda Zurita RN  Safety Promotion/Fall Prevention:   safety round/check completed   room organization consistent  Taken 2/6/2024 1039 by Rhonda Zurita, RN  Safety Promotion/Fall Prevention:   safety round/check completed   room organization consistent   Goal Outcome Evaluation:  Plan of Care Reviewed With: patient        Progress: improving  Outcome Evaluation: Patient npo for lap cholesystectomy with Dr Hernandez this afternoon. GI following as well. Patient complains of itching, atarax provides some relief. NS at 100ml/hr. Will continue to monitor.

## 2024-02-06 NOTE — ANESTHESIA PROCEDURE NOTES
Airway  Urgency: elective    Date/Time: 2/6/2024 2:41 PM  Airway not difficult    General Information and Staff    Patient location during procedure: OR  Anesthesiologist: Enid Rincon MD  CRNA/CAA: Lurdes López CRNA    Indications and Patient Condition  Indications for airway management: airway protection    Preoxygenated: yes (pt pre-O2 with 100% O2)  Mask difficulty assessment: 2 - vent by mask + OA or adjuvant +/- NMBA (easy BMV )    Final Airway Details  Final airway type: endotracheal airway      Successful airway: ETT  Cuffed: yes   Successful intubation technique: direct laryngoscopy  Endotracheal tube insertion site: oral  Blade: Romaine  Blade size: 4  ETT size (mm): 7.0  Cormack-Lehane Classification: grade I - full view of glottis  Placement verified by: chest auscultation and capnometry   Cuff volume (mL): 7  Measured from: lips  ETT/EBT  to lips (cm): 21  Number of attempts at approach: 1  Assessment: lips, teeth, and gum same as pre-op and atraumatic intubation    Additional Comments  ATOETx1. No change in dentition.

## 2024-02-06 NOTE — ANESTHESIA POSTPROCEDURE EVALUATION
Patient: Scarlet Quinonezarty    Procedure Summary       Date: 02/06/24 Room / Location: [facility] OR [address] [place] [address] MAIN OR    Anesthesia Start: 1433 Anesthesia Stop: 1605    Procedure: CHOLECYSTECTOMY LAPAROSCOPIC INTRAOPERATIVE CHOLANGIOGRAM liver biopsy (Abdomen) Diagnosis:       Gallstone pancreatitis      (gallstone pancreatitis)    Surgeons: Tom Hernandez MD Provider: Lurdes López CRNA    Anesthesia Type: general ASA Status: 3            Anesthesia Type: general    Vitals  Vitals Value Taken Time   /71 02/06/24 1707   Temp 97.5 °F (36.4 °C) 02/06/24 1601   Pulse 73 02/06/24 1711   Resp 16 02/06/24 1707   SpO2 95 % 02/06/24 1711   Vitals shown include unfiled device data.        Post Anesthesia Care and Evaluation    Patient location during evaluation: PACU  Patient participation: complete - patient participated  Level of consciousness: awake  Pain scale: See nurse's notes for pain score.  Pain management: adequate    Airway patency: patent  Anesthetic complications: No anesthetic complications  PONV Status: none  Cardiovascular status: acceptable  Respiratory status: acceptable and spontaneous ventilation  Hydration status: acceptable    Comments: Patient seen and examined postoperatively; vital signs stable; SpO2 greater than or equal to 90%; cardiopulmonary status stable; nausea/vomiting adequately controlled; pain adequately controlled; no apparent anesthesia complications; patient discharged from anesthesia care when discharge criteria were met

## 2024-02-06 NOTE — CASE MANAGEMENT/SOCIAL WORK
Discharge Planning Assessment   [facility]     Patient Name: Scarlet Gong  MRN: [id number]  Today's Date: 2/6/2024    Admit Date: 2/3/2024    Plan: Return home   Discharge Needs Assessment       Row Name 02/06/24 1325       Living Environment    People in Home alone    Current Living Arrangements home    Potentially Unsafe Housing Conditions none    In the past 12 months has the electric, gas, oil, or water company threatened to shut off services in your home? No    Primary Care Provided by self    Provides Primary Care For no one    Quality of Family Relationships helpful;involved;supportive    Able to Return to Prior Arrangements yes       Resource/Environmental Concerns    Resource/Environmental Concerns none    Transportation Concerns none       Transportation Needs    In the past 12 months, has lack of transportation kept you from medical appointments or from getting medications? no    In the past 12 months, has lack of transportation kept you from meetings, work, or from getting things needed for daily living? No       Food Insecurity    Within the past 12 months, you worried that your food would run out before you got the money to buy more. Never true    Within the past 12 months, the food you bought just didn't last and you didn't have money to get more. Never true       Transition Planning    Patient/Family Anticipates Transition to home    Patient/Family Anticipated Services at Transition none    Transportation Anticipated car, drives self;family or friend will provide       Discharge Needs Assessment    Readmission Within the Last 30 Days no previous admission in last 30 days    Equipment Currently Used at Home none    Concerns to be Addressed no discharge needs identified    Anticipated Changes Related to Illness none    Equipment Needed After Discharge none                   Discharge Plan       Row Name 02/06/24 1328       Plan    Patient/Family in Agreement with Plan yes    Plan Comments Barriers:  Cholecystectomy today. CM met with Mrs. Gong who lives alone, drives and is independent and still works full time. She does not own or use any equipment. She denied difficulty purchasing medications or utilities. Verified PCP and Pharmacy. CM will follow as needed                Demographic Summary       Row Name 02/06/24 1324       General Information    Admission Type inpatient    Arrived From emergency department    Required Notices Provided Important Message from Medicare    Referral Source admission list    Reason for Consult discharge planning    Preferred Language English       Contact Information    Permission Granted to Share Info With                    Functional Status       Row Name 02/06/24 1324       Functional Status    Usual Activity Tolerance good    Current Activity Tolerance moderate       Functional Status, IADL    Medications independent    Meal Preparation independent    Housekeeping independent    Laundry independent    Shopping independent       Mental Status    General Appearance WDL WDL       Mental Status Summary    Recent Changes in Mental Status/Cognitive Functioning no changes                             Maintained distance greater than six feet and spent less than 15 minutes in the room.     Sussy HOFFMAN,RN Case Manager  [facility]  Phone: Desk- [phone]  Cell- [phone]

## 2024-02-06 NOTE — PROGRESS NOTES
LOS: 0 days   Patient Care Team:  Ruben Mejias MD as PCP - General (Family Medicine)  Hien Camacho MD as Consulting Physician (Cardiology)      Subjective     Interval History:     Subjective: Patient continues to complain of chest pain/epigastric pain that is unchanged.  Also continues to have itching.  No nausea/vomiting.      ROS:   No chest pain, shortness of breath, or cough.        Medication Review:     Current Facility-Administered Medications:     acetaminophen (TYLENOL) tablet 650 mg, 650 mg, Oral, Q4H PRN, Sussy Raphael PA    albuterol (PROVENTIL) nebulizer solution 0.083% 2.5 mg/3mL, 2.5 mg, Nebulization, Q6H PRN, Sussy Raphael PA    benzonatate (TESSALON) capsule 100 mg, 100 mg, Oral, TID PRN, Sussy Raphael PA, 100 mg at 02/04/24 2141    sennosides-docusate (PERICOLACE) 8.6-50 MG per tablet 2 tablet, 2 tablet, Oral, BID, 2 tablet at 02/05/24 2135 **AND** [DISCONTINUED] polyethylene glycol (MIRALAX) packet 17 g, 17 g, Oral, Daily PRN **AND** bisacodyl (DULCOLAX) EC tablet 5 mg, 5 mg, Oral, Daily PRN **AND** bisacodyl (DULCOLAX) suppository 10 mg, 10 mg, Rectal, Daily PRN, Sussy Raphael PA    Calcium Replacement - Follow Nurse / BPA Driven Protocol, , Does not apply, PRN, Sherita Ervin, APRN    ferrous sulfate EC tablet 324 mg, 324 mg, Oral, Daily With Breakfast, Sussy Raphael PA, 324 mg at 02/06/24 0837    gabapentin (NEURONTIN) capsule 100 mg, 100 mg, Oral, Nightly, Sussy Raphael PA, 100 mg at 02/05/24 2132    hydrALAZINE (APRESOLINE) tablet 75 mg, 75 mg, Oral, BID, Sussy Raphael PA, 75 mg at 02/06/24 0838    hydroCHLOROthiazide (HYDRODIURIL) tablet 50 mg, 50 mg, Oral, Daily, Sussy Raphael PA, 50 mg at 02/06/24 0837    hydrOXYzine (ATARAX) tablet 25 mg, 25 mg, Oral, Q6H PRN, Sussy Raphael PA, 25 mg at 02/06/24 0837    Magnesium Standard Dose Replacement - Follow Nurse / BPA Driven Protocol, , Does not apply, PRN, Sherita Ervin, APRN    melatonin tablet 5  mg, 5 mg, Oral, Nightly PRN, Sussy Raphael PA, 5 mg at 02/04/24 2141    ondansetron (ZOFRAN) injection 4 mg, 4 mg, Intravenous, Q6H PRN, Sussy Raphael PA    Phosphorus Replacement - Follow Nurse / BPA Driven Protocol, , Does not apply, PRN, TripureDeanSherita S, APRN    polyethylene glycol (MIRALAX) packet 17 g, 17 g, Oral, Daily, Laura Martins, APRN, 17 g at 02/05/24 0916    Potassium Replacement - Follow Nurse / BPA Driven Protocol, , Does not apply, PRN, Tripure, Sherita S, APRN    rOPINIRole (REQUIP) tablet 1 mg, 1 mg, Oral, Nightly, TripSherita nicole APRN, 1 mg at 02/05/24 2132    [COMPLETED] Insert Peripheral IV, , , Once **AND** sodium chloride 0.9 % flush 10 mL, 10 mL, Intravenous, PRN, Sussy Raphael PA    sodium chloride 0.9 % flush 10 mL, 10 mL, Intravenous, Q12H, Sussy Raphael PA, 10 mL at 02/06/24 0838    sodium chloride 0.9 % flush 10 mL, 10 mL, Intravenous, PRN, Sussy Raphael PA    sodium chloride 0.9 % infusion 40 mL, 40 mL, Intravenous, PRN, Sussy Raphael PA    sodium chloride 0.9 % infusion, 100 mL/hr, Intravenous, Continuous, Sherita Ervin APRN, Last Rate: 100 mL/hr at 02/06/24 0945, 100 mL/hr at 02/06/24 0945    ursodiol (ACTIGALL) tablet 500 mg, 500 mg, Oral, Q12H, Sussy Raphael PA, 500 mg at 02/06/24 0837      Objective     Vital Signs  Vitals:    02/05/24 2131 02/06/24 0229 02/06/24 0607 02/06/24 0837   BP: 139/57 136/55 125/59 138/70   BP Location: Left arm Left arm Left arm    Patient Position: Lying Lying Lying    Pulse: 67 76 67 65   Resp: 20 18 18    Temp: 98.6 °F (37 °C) 98.4 °F (36.9 °C) 97.8 °F (36.6 °C)    TempSrc: Oral Oral Oral    SpO2: 97% 97% 98%    Weight:       Height:           Physical Exam:     General Appearance:    Awake and alert, in no acute distress   Head:    Normocephalic, without obvious abnormality   Eyes:          Conjunctivae normal, anicteric sclera   Throat:   No oral lesions, no thrush, oral mucosa moist   Neck:   No adenopathy,  supple, no JVD   Lungs:     respirations regular, even and unlabored   Abdomen:     Soft, mild epigastric tenderness, no rebound or guarding, nondistended   Rectal:     Deferred   Extremities:   No edema, no cyanosis   Skin:   No bruising or rash, no jaundice        Results Review:    CBC    Results from last 7 days   Lab Units 02/06/24  0515 02/05/24  0324 02/04/24  0557 02/03/24  1338   WBC 10*3/mm3 9.30 10.20 11.20* 10.10   HEMOGLOBIN g/dL 12.4 11.5* 12.1 13.1   PLATELETS 10*3/mm3 349 321 371 422     CMP   Results from last 7 days   Lab Units 02/06/24  0515 02/05/24  0324 02/04/24  0758 02/03/24  1338   SODIUM mmol/L 139 136 138 140   POTASSIUM mmol/L 4.3 3.4* 3.8 3.5   CHLORIDE mmol/L 103 101 102 100   CO2 mmol/L 24.0 24.0 24.0 25.0   BUN mg/dL 14 17 20 18   CREATININE mg/dL 0.59 0.61 0.59 0.67   GLUCOSE mg/dL 130* 150* 133* 98   ALBUMIN g/dL 3.8 3.5 4.0 4.4   BILIRUBIN mg/dL 0.5 0.6 0.9 1.5*   ALK PHOS U/L 414* 446* 533* 699*   AST (SGOT) U/L 42* 63* 150* 415*   ALT (SGPT) U/L 243* 326* 501* 798*   MAGNESIUM mg/dL  --  1.7  --   --    LIPASE U/L  --  129* 135* 1,065*     Cr Clearance Estimated Creatinine Clearance: 99.8 mL/min (by C-G formula based on SCr of 0.59 mg/dL).  Coag     HbA1C   Lab Results   Component Value Date    HGBA1C 6.40 (H) 11/30/2023    HGBA1C 6.2 (H) 03/04/2020         Infection     UA      Microbiology Results (last 10 days)       ** No results found for the last 240 hours. **          Imaging Results (Last 72 Hours)       Procedure Component Value Units Date/Time    MRI abdomen w wo contrast mrcp [[id number]] Collected: 02/04/24 1535     Updated: 02/04/24 1558    Narrative:      MRI ABDOMEN W WO CONTRAST MRCP    Date of Exam: 2/4/2024 2:40 PM EST    Indication: Per GI. Epigastric pain, pancreatitis. Dilated common bile duct on CT scan.     Comparison: 3/3/2024 abdomen pelvis CT scan    Technique:  Routine multiplanar/multisequence images of the abdomen were obtained with MRCP sequences  before and after the uneventful administration of 20 mL Prohance.      Findings:  The included lower lungs appear clear. No pleural effusion is seen. Right and left lobe liver morphology appears normal. No hepatic lesions are seen. There is moderate fatty liver change. Gallbladder is contracted around numerous small gallstones. No   gallbladder wall thickening is identified but there is a trace amount of fluid in the gallbladder fossa. No significant abnormalities are seen of the spleen, adrenal glands, or kidneys no consistent signal abnormality is seen in the common duct to   suggest common duct stones. Common duct is borderline dilated at 8 mm. Pancreatic duct appears normal. There is no evidence of divisum.     No ascites or adenopathy is appreciated. Postcontrast images appear to show greater than expected enhancement of the gallbladder wall. There also appears to be increased enhancement of the wall of the intrapancreatic portion of the common duct, greater   than seen in the remainder of the duct or the pancreas itself, likely secondary inflammation from pancreatitis. There is no evidence of pathologic solid organ enhancement or other enhancement elsewhere. Bowel loops are normal in caliber.. There appears   to be normal flow signal in the mesenteric vasculature.    MRCP images show borderline dilatation of the common duct, no evidence of common duct stone or stricture, and normal caliber pancreatic duct. No definite sidebranch dilatation is identified.        Impression:      Impression:    1. Contracted gallbladder with numerous small gallstones. Increased enhancement of the gallbladder wall, suggesting mild inflammation despite lack of gallbladder wall thickening.    2. Borderline dilated common duct but no visible common duct stone or stricture. Normal-appearing pancreatic duct.    3. Focal enhancement of the intrapancreatic segment of the common duct, which may reflect the patient's underlying mild  pancreatitis. No visible changes of pancreatitis elsewhere.      Electronically Signed: Kaiden Flores MD    2/4/2024 3:56 PM EST    Workstation ID: [id number]    CT Abdomen Pelvis With Contrast [[id number]] Collected: 02/03/24 1648     Updated: 02/03/24 1658    Narrative:      CT ABDOMEN PELVIS W CONTRAST    Date of Exam: 2/3/2024 4:37 PM EST    Indication: epigastric abd pain, elevated lipase.    Comparison: None available.    Technique: Axial CT images were obtained of the abdomen and pelvis following the uneventful intravenous administration of iodinated contrast. Sagittal and coronal reconstructions were performed.  Automated exposure control and iterative reconstruction   methods were used.        Findings:  Lower Thorax: Lung bases are clear.    Peritoneum: No free air or free fluid.     Appendix: Appendix is well seen and is normal.    Kidneys: No hydronephrosis. 2 mm nonobstructing renal calculus mid left kidney. No focal renal lesions.    Ureters: No obstructing calculi or mass.    Urinary bladder: Urinary bladder has normal appearance on CT given degree of distention.    Liver: No focal hepatic lesions. Mild hepatic steatosis. Mild hepatomegaly.    Gallbladder and bile ducts: Gallbladder is not well distended. There is wall thickening and mild inflammation suspected although this could be secondary to the incomplete distention. Common bile duct is dilated up to 1.3 cm..    Spleen: Spleen is normal size.  No focal splenic lesions.    Adrenal glands: Unremarkable.    Pancreas: No focal masses.  No pancreatic duct dilation. No surrounding inflammation.    Abdominal aorta and Vascular Structures: No aneurysmal dilation. No significant atherosclerotic disease.    Stomach and Bowel: No abnormally dilated loops of bowel.  No significant hiatal hernia.  No significant bowel wall thickening.  Ligament of Treitz has normal anatomic position. Moderate stool burden.    Reproductive Organs: Status post hysterectomy.  Lymph  nodes: No pathologically enlarged lymph nodes.    Soft tissues: Unremarkable.    Osseous structures: No aggressive focal lytic or sclerotic osseous lesions.    Evaluation of bowel and solid organs is limited without contrast administration.      Impression:      The gallbladder is not well-distended there is wall thickening and probable pericholecystic inflammation. Correlate for acute cholecystitis. The common bile duct is dilated up to 1.3 cm. No obstructing stones are seen within the bile duct.  There are no findings of pancreatitis on CT. Pancreatitis is a clinical diagnosis. No complications of pancreatitis on this exam.  Mild hepatomegaly with hepatic steatosis.  Moderate stool burden correlate for constipation.  Nonobstructing small calculus mid left kidney.    Electronically Signed: Milagro Collins MD    2/3/2024 4:56 PM EST    Workstation ID: [id number]    US Liver [[id number]] Collected: 02/03/24 1607     Updated: 02/03/24 1611    Narrative:      US LIVER    Date of Exam: 2/3/2024 3:40 PM EST    Indication: abnormal LFTs.    Comparison: No comparisons available.    Technique: Grayscale and color Doppler ultrasound evaluation of the right upper quadrant was performed.      Findings:  Liver parenchyma is heterogeneous and somewhat echogenic compatible with fatty infiltration. Hepatic vasculature is within normal limits. No focal hepatic mass identified.    Gallbladder is poorly distended and contains echogenic foci compatible with multiple stones. No pericholecystic fluid identified. Common hepatic duct is normal measuring 5 mm.      Impression:      Impression:    1. Cholelithiasis. No evidence of biliary tract obstruction.  2. Hepatic steatosis    Electronically Signed: Anthony Tong MD    2/3/2024 4:09 PM EST    Workstation ID: [id number]            Assessment & Plan     ASSESSMENT:  -Elevated LFT's & lipase   -Cholelithiasis  -LEEANNE   -HTN  -Asthma   -Pruritus consider biliary related  -Constipation per  CT  -Leukocytosis  -Family history of colon cancer-maternal grandmother     PLAN:  Patient is a 68-year-old female with history of asthma and hypertension who presented on 2/3 with complaints of itching and abdominal pain.  CT on admission showed probable Ken cholecystic inflammation with wall thickening of the gallbladder, CBD of 1.3 cm without obstructing stone in the bile duct.  No convincing signs of pancreatitis on CT.  LFTs were noted to be elevated with total bilirubin 0.9, alk phos 533, , and .  Lipase 135.    Patient continues to complain of chest pain/epigastric pain that is unchanged.  No nausea/vomiting.  Continues to complain of itching.  Plan is for laparoscopic cholecystectomy today.  We have requested liver biopsy at the time of lap renny.  Liver enzymes continue to trend down.  Total bilirubin 0.5 from 0.6, alk phos 414 from 446, AST 42 from 63, and  from 326.  .  Further serologies pending.  Await results.  Continue ursodiol and hydroxyzine.  Continue supportive care.      Electronically signed by ANGELINA Lopez, 02/06/24, 9:57 AM EST.

## 2024-02-06 NOTE — OP NOTE
Operative Report:    Patient Name:  Scarlet Gong  YOB: 1955    Date of Surgery:  2/6/2024     Indications: 68-year-old lady admitted with epigastric abdominal pain elevated LFTs and lab work demonstrating a lipase over thousand imaging demonstrating some mild pancreatitis.  I was asked to see her for consideration of cholecystectomy.  Preoperatively gastroenterology has also been involved and requested a liver biopsy at the time of the operation. she understood the risk of the operation was willing to proceed.    Pre-op Diagnosis:   gallstone pancreatitis       Post-Op Diagnosis Codes:     * Gallstone pancreatitis [K85.10]    Procedure/CPT® Codes:      Procedure(s):  CHOLECYSTECTOMY LAPAROSCOPIC INTRAOPERATIVE CHOLANGIOGRAM, liver biopsy    Staff:  Surgeon(s):  Tom Hernandez MD    Circulator: Jose De Jesus France RN; Jennifer Hinojosa RN  Radiology Technologist: Anitha Turcios  Scrub Person: Vandana Restrepo; Queta Restrepo  Vendor Representative: Cristian Rodriguez  Assistant: Lv Miranda CSFA  was responsible for performing the following activities: Retraction, Closing, and Held/Positioned Camera and their skilled assistance was necessary for the success of this case.        Anesthesia: General    Estimated Blood Loss: 25 mL    Implants:    Implant Name Type Inv. Item Serial No.  Lot No. LRB No. Used Action   CLIPAPPLR M/ ENDO LIGAMAX5 5MM 33CM MD/ROSA - [id number] Implant CLIPAPPLR M/ ENDO LIGAMAX5 5MM 33CM MD/ROSA  [employer]  DIV OF J AND J [id number] N/A 1 Implanted       Specimen:          Specimens       ID Source Type Tests Collected By Collected At Frozen?    A Gallbladder Tissue TISSUE PATHOLOGY EXAM   Tom Hernandez MD 2/6/24 1509     B Liver Tissue TISSUE PATHOLOGY EXAM   Tom Hernandez MD 2/6/24 1515     Description: biopsy                Findings: Cholangiogram demonstrating access within the gallbladder lumen there was passage of contrast through the  cystic duct there appeared to be some cystic duct stones.  The common bile duct had a smooth taper with passage of contrast into the duodenum there was little backfilling of the proximal biliary tree    Complications: None    Description of Procedure: Patient is taken to the operating room placed on the operating table in the supine position and general anesthesia.  Her abdomen was prepped and draped normal sterile fashion.  Timeout was performed identifying the patient procedure site of operation.    The operation Mitran the abdomen through a left upper quadrant 5 mm optical trocar entry.  Upon entry the abdominal cavity the abdomen was insufflated camera to there is no evidence of injury to underlying structures.  She was placed in reverse Trendelenburg with the right side up.  Periumbilical 12 and 2 right subcostal 5 m were placed under laparoscopic guidance.  The gallbladder was inspectedm there was inflammatory adhesions to the omentum.  He had to be taken down in order to grasp the gallbladder once that was done it was rocksolid and had some difficult the manipulating it due to the impacted stones.  I was able to work my way down to the infundibulum where there was quite a bit of surrounding inflammation making it difficult to identify the cystic duct and cystic artery.  I was able to identify the cystic artery once some bleeding occurred.  I had to clamp this with the [place] dissector and then was able to get a Hemoclip on it to stop the bleeding.  At this level I was able to dissected around the infundibulum of the gallbladder.  Once that was done I placed the Beard clamp across the gallbladder body itself and obtained the cholangiogram as above.  I do not see any filling defects of the bile duct.  As there is some inflammation at the infundibulum I decided to take the gallbladder off the gallbladder fossa in a top-down approach.  Once the top-down approach reached the window I made posteriorly the  Endoloop was used to suture ligate the cystic duct.  Gallbladder was then amputated with the scissors placed Endo Catch bag and removed.  There was some spilled gallstones which were evacuated with the suction .  I then used a Hugh-Cut core needle biopsy to obtain 2 samples from the anterior liver edge.  These were sent down to pathology on a Telfa as a permanent specimen.  These 2 sites were cauterized for hemostasis.  Dissection planes were inspected hemostasis had been achieved the Endoloop was in good position the ports were serially moved out the abdominal hemorrhage.  The 12 mm port site is closed with 0 Vicryl suture and suture passer.  Abdomen is desufflated skin was closed with 4-0 Vicryl suture and skin affix.  She tolerated procedure well's been transferred to recovery in satisfactory condition.      Tom Hernandez MD     Date: 2/6/2024  Time: 16:05 EST    This note was created using Dragon Voice Recognition software.

## 2024-02-06 NOTE — ANESTHESIA PREPROCEDURE EVALUATION
Anesthesia Evaluation     Patient summary reviewed and Nursing notes reviewed   no history of anesthetic complications:   NPO Solid Status: > 8 hours  NPO Liquid Status: > 8 hours           Airway   Mallampati: II  TM distance: >3 FB  Neck ROM: full  Dental    (+) edentulous    Pulmonary - normal exam   (+) a smoker Former, asthma,sleep apnea  Cardiovascular - normal exam    ECG reviewed    (+) hypertension, hyperlipidemia  (-) angina      Neuro/Psych  GI/Hepatic/Renal/Endo    (+) morbid obesity    Musculoskeletal (-) negative ROS    Abdominal    Substance History - negative use     OB/GYN          Other - negative ROS                     Anesthesia Plan    ASA 3     general     intravenous induction     Anesthetic plan, risks, benefits, and alternatives have been provided, discussed and informed consent has been obtained with: patient.    Plan discussed with CRNA and CAA.    CODE STATUS:    Level Of Support Discussed With: Patient  Code Status (Patient has no pulse and is not breathing): CPR (Attempt to Resuscitate)  Medical Interventions (Patient has pulse or is breathing): Full Support

## 2024-02-07 ENCOUNTER — READMISSION MANAGEMENT (OUTPATIENT)
Dept: CALL CENTER | Facility: HOSPITAL | Age: 69
End: 2024-02-07
Payer: MEDICARE

## 2024-02-07 ENCOUNTER — NURSE TRIAGE (OUTPATIENT)
Dept: CALL CENTER | Facility: HOSPITAL | Age: 69
End: 2024-02-07
Payer: MEDICARE

## 2024-02-07 VITALS
HEART RATE: 88 BPM | TEMPERATURE: 98.1 F | SYSTOLIC BLOOD PRESSURE: 142 MMHG | HEIGHT: 62 IN | BODY MASS INDEX: 38.83 KG/M2 | DIASTOLIC BLOOD PRESSURE: 58 MMHG | OXYGEN SATURATION: 94 % | RESPIRATION RATE: 16 BRPM | WEIGHT: 210.98 LBS

## 2024-02-07 PROBLEM — Z90.49 STATUS POST LAPAROSCOPIC CHOLECYSTECTOMY: Status: ACTIVE | Noted: 2024-02-07

## 2024-02-07 PROBLEM — K85.10 ACUTE GALLSTONE PANCREATITIS: Status: ACTIVE | Noted: 2024-02-06

## 2024-02-07 LAB
ALBUMIN SERPL-MCNC: 3.8 G/DL (ref 3.5–5.2)
ALBUMIN/GLOB SERPL: 1.2 G/DL
ALP SERPL-CCNC: 348 U/L (ref 39–117)
ALT SERPL W P-5'-P-CCNC: 190 U/L (ref 1–33)
ANA SER QL: NEGATIVE
ANION GAP SERPL CALCULATED.3IONS-SCNC: 10 MMOL/L (ref 5–15)
AST SERPL-CCNC: 65 U/L (ref 1–32)
BASOPHILS # BLD AUTO: 0.1 10*3/MM3 (ref 0–0.2)
BASOPHILS NFR BLD AUTO: 1 % (ref 0–1.5)
BILIRUB SERPL-MCNC: 0.5 MG/DL (ref 0–1.2)
BUN SERPL-MCNC: 14 MG/DL (ref 8–23)
BUN/CREAT SERPL: 21.5 (ref 7–25)
CALCIUM SPEC-SCNC: 9.6 MG/DL (ref 8.6–10.5)
CHLORIDE SERPL-SCNC: 99 MMOL/L (ref 98–107)
CO2 SERPL-SCNC: 26 MMOL/L (ref 22–29)
CREAT SERPL-MCNC: 0.65 MG/DL (ref 0.57–1)
CRP SERPL-MCNC: 1.04 MG/DL (ref 0–0.5)
DEPRECATED RDW RBC AUTO: 49.4 FL (ref 37–54)
EGFRCR SERPLBLD CKD-EPI 2021: 96.6 ML/MIN/1.73
EOSINOPHIL # BLD AUTO: 0 10*3/MM3 (ref 0–0.4)
EOSINOPHIL NFR BLD AUTO: 0.1 % (ref 0.3–6.2)
ERYTHROCYTE [DISTWIDTH] IN BLOOD BY AUTOMATED COUNT: 15.7 % (ref 12.3–15.4)
GLOBULIN UR ELPH-MCNC: 3.1 GM/DL
GLUCOSE SERPL-MCNC: 118 MG/DL (ref 65–99)
HCT VFR BLD AUTO: 34.5 % (ref 34–46.6)
HGB BLD-MCNC: 11.3 G/DL (ref 12–15.9)
INR PPP: 1.02 (ref 0.93–1.1)
LYMPHOCYTES # BLD AUTO: 1.8 10*3/MM3 (ref 0.7–3.1)
LYMPHOCYTES NFR BLD AUTO: 16.2 % (ref 19.6–45.3)
MCH RBC QN AUTO: 27.9 PG (ref 26.6–33)
MCHC RBC AUTO-ENTMCNC: 32.7 G/DL (ref 31.5–35.7)
MCV RBC AUTO: 85.4 FL (ref 79–97)
MONOCYTES # BLD AUTO: 0.5 10*3/MM3 (ref 0.1–0.9)
MONOCYTES NFR BLD AUTO: 4.7 % (ref 5–12)
NEUTROPHILS NFR BLD AUTO: 78 % (ref 42.7–76)
NEUTROPHILS NFR BLD AUTO: 8.5 10*3/MM3 (ref 1.7–7)
NRBC BLD AUTO-RTO: 0 /100 WBC (ref 0–0.2)
PLATELET # BLD AUTO: 362 10*3/MM3 (ref 140–450)
PMV BLD AUTO: 8.1 FL (ref 6–12)
POTASSIUM SERPL-SCNC: 4.2 MMOL/L (ref 3.5–5.2)
PROT SERPL-MCNC: 6.9 G/DL (ref 6–8.5)
PROTHROMBIN TIME: 11.1 SECONDS (ref 9.6–11.7)
RBC # BLD AUTO: 4.04 10*6/MM3 (ref 3.77–5.28)
SODIUM SERPL-SCNC: 135 MMOL/L (ref 136–145)
WBC NRBC COR # BLD AUTO: 10.9 10*3/MM3 (ref 3.4–10.8)

## 2024-02-07 PROCEDURE — 86140 C-REACTIVE PROTEIN: CPT | Performed by: SURGERY

## 2024-02-07 PROCEDURE — 85025 COMPLETE CBC W/AUTO DIFF WBC: CPT | Performed by: SURGERY

## 2024-02-07 PROCEDURE — 99024 POSTOP FOLLOW-UP VISIT: CPT | Performed by: SURGERY

## 2024-02-07 PROCEDURE — 80053 COMPREHEN METABOLIC PANEL: CPT | Performed by: SURGERY

## 2024-02-07 PROCEDURE — 85610 PROTHROMBIN TIME: CPT | Performed by: SURGERY

## 2024-02-07 RX ORDER — HYDROCODONE BITARTRATE AND ACETAMINOPHEN 7.5; 325 MG/1; MG/1
1 TABLET ORAL EVERY 6 HOURS PRN
Qty: 15 TABLET | Refills: 0 | Status: SHIPPED | OUTPATIENT
Start: 2024-02-07

## 2024-02-07 RX ADMIN — HYDROCHLOROTHIAZIDE 50 MG: 25 TABLET ORAL at 08:03

## 2024-02-07 RX ADMIN — FERROUS SULFATE TAB EC 324 MG (65 MG FE EQUIVALENT) 324 MG: 324 (65 FE) TABLET DELAYED RESPONSE at 08:04

## 2024-02-07 RX ADMIN — DOCUSATE SODIUM 50 MG AND SENNOSIDES 8.6 MG 2 TABLET: 8.6; 5 TABLET, FILM COATED ORAL at 08:04

## 2024-02-07 RX ADMIN — HYDRALAZINE HYDROCHLORIDE 75 MG: 25 TABLET ORAL at 08:03

## 2024-02-07 RX ADMIN — Medication 10 ML: at 08:09

## 2024-02-07 RX ADMIN — OXYCODONE HYDROCHLORIDE 5 MG: 5 TABLET ORAL at 08:04

## 2024-02-07 RX ADMIN — URSODIOL 500 MG: 500 TABLET, FILM COATED ORAL at 08:04

## 2024-02-07 NOTE — TELEPHONE ENCOUNTER
Reason for Disposition   [1] Caller has URGENT medicine question about med that PCP or specialist prescribed AND [2] triager unable to answer question    Additional Information   Negative: [1] Intentional drug overdose AND [2] suicidal thoughts or ideas   Negative: Drug overdose and triager unable to answer question   Negative: Caller requesting a renewal or refill of a medicine patient is currently taking   Negative: Caller requesting information unrelated to medicine   Negative: Caller requesting information about COVID-19 Vaccine   Negative: Caller requesting information about Emergency Contraception   Negative: Caller requesting information about Combined Birth Control Pills   Negative: Caller requesting information about Progestin Birth Control Pills   Negative: Caller requesting information about Post-Op pain or medicines   Negative: Caller requesting a prescription antibiotic (such as Penicillin) for Strep throat and has a positive culture result   Negative: Caller requesting a prescription anti-viral med (such as Tamiflu) and has influenza (flu) symptoms   Negative: Immunization reaction suspected   Negative: Rash while taking a medicine or within 3 days of stopping it   Negative: [1] Asthma and [2] having symptoms of asthma (cough, wheezing, etc.)   Negative: [1] Symptom of illness (e.g., headache, abdominal pain, earache, vomiting) AND [2] more than mild   Negative: Breastfeeding questions about mother's medicines and diet   Negative: MORE THAN A DOUBLE DOSE of a prescription or over-the-counter (OTC) drug   Negative: [1] DOUBLE DOSE (an extra dose or lesser amount) of prescription drug AND [2] any symptoms (e.g., dizziness, nausea, pain, sleepiness)   Negative: [1] DOUBLE DOSE (an extra dose or lesser amount) of over-the-counter (OTC) drug AND [2] any symptoms (e.g., dizziness, nausea, pain, sleepiness)   Negative: Took another person's prescription drug   Negative: [1] DOUBLE DOSE (an extra dose or  "lesser amount) of prescription drug AND [2] NO symptoms  (Exception: A double dose of antibiotics.)   Negative: Diabetes drug error or overdose (e.g., took wrong type of insulin or took extra dose)   Negative: [1] Prescription not at pharmacy AND [2] was prescribed by PCP recently (Exception: Triager has access to EMR and prescription is recorded there. Go to Home Care and confirm for pharmacy.)   Negative: [1] Pharmacy calling with prescription question AND [2] triager unable to answer question   Negative: Medicine patch causing local rash or itching    Answer Assessment - Initial Assessment Questions  1. NAME of MEDICINE: \"What medicine(s) are you calling about?\"      Hydroxyzine for itching  2. QUESTION: \"What is your question?\" (e.g., double dose of medicine, side effect)      Not at pharmacy  3. PRESCRIBER: \"Who prescribed the medicine?\" Reason: if prescribed by specialist, call should be referred to that group.      Hospitalist  4. SYMPTOMS: \"Do you have any symptoms?\" If Yes, ask: \"What symptoms are you having?\"  \"How bad are the symptoms (e.g., mild, moderate, severe)      itching  5. PREGNANCY:  \"Is there any chance that you are pregnant?\" \"When was your last menstrual period?\"      no    Protocols used: Medication Question Call-ADULT-    "

## 2024-02-07 NOTE — DISCHARGE SUMMARY
"Discharge Note   Scarlet Gong 1955 [id number]     Date of Admission:2/3/2024     Date of Discharge: 02/07/24     Discharge Diagnosis:     Acute gallstone pancreatitis    Status post laparoscopic cholecystectomy       Consults: GI and general surgery    Hospital Course:     ED 02/03/2024  Patient is a 68-year-old female history of hyperlipidemia hypertension asthma presents to the ER with complaints of itching all over.  Patient states that she has been itching all over since January.  She states that she has been seen by a dermatologist and was started on a triamcinolone cream.  She states it is not helping.  Patient presents today complaining of persistent and severe itching that she cannot tolerate.  There is no rash hives or urticaria.  She reports no new soaps, laundry detergents lotions perfumes or medications.  No new antibiotics.  Patient reports that she had some severe epigastric abdominal pain yesterday but not today.  No nausea or vomiting.  No discoloration of her skin.  No nausea vomiting no history of liver disease.     ED 02/04/2024  Patient agrees with HPI noted above including generalized rash and itching with onset 20/11.  She saw dermatologist on Friday and tried the steroid cream that they prescribed without any relief.  She states that her symptoms flared up and she proceeded to the ED yesterday.  She reports that itching and rash have improved overnight.   Per ED notes patient's lipase were very elevated and she was admitted to the obs unit for GI consultation.  Patient denies any abdominal pain but reports some soreness on the epigastric area.     Per GI note:  \"68-year-old woman with itching and elevated liver enzymes.  She reports itching and postprandial epigastric discomfort \"for quite some time.  She has been having severe diffuse itching.  She saw a dermatologist who gave her some topical creams which did not help.  She then presented to the emergency room yesterday and " "laboratory studies showed elevated liver enzymes with   alk phos 699 bilirubin 1.5.  Lipase was 1065.  MRCP is pending.  On exam her abdomen is obese nondistended with good bowel sounds.  She is mildly tender in the epigastric region.  No masses palpated.  No organomegaly or ascites.  Liver enzymes as noted above.  White blood count 11.2 today.  Hemoglobin 12.1.  CT scan with contrast shows that the gallbladder is not well-distended with some wall thickening and probably has Ken cholecystic inflammation.  The common bile duct is dilated to 1.3 cm.  No stones seen by CT scan within the common bile duct.  No findings of pancreatitis on CT scan.  Ultrasound shows gallstones but no evidence of biliary obstruction.  It is unclear if she has itching and elevated liver enzymes related to cholestasis versus biliary obstruction.  Will check MRCP to look for evidence of an obstructive process.  Will continue ursodiol and hydroxyzine empirically.  This has greatly improved her itching.  Consider ERCP if she has evidence of biliary obstruction.\"   \"She reported ongoing epigastric pain and itching.  No new events overnight.  Alk phos 414, CBC normal, CRP 2.  Plan is for cholecystectomy today and liver biopsy at the time of cholecystectomy.  GI will follow-up as an outpatient.  Follow-up in the office.  Diet as tolerated following cholecystectomy.\"    Surgery was consulted and pt was taken to the OR on 2/6 where she underwent a lap renny.  She has done well since surgery.  Case d/w surgeon.  Pt stable to d/c at this time.  Follow-up appts to be made.        Vitals:    02/07/24 1016   BP: 142/58   Pulse: 88   Resp: 16   Temp:    SpO2: 94%        Physical Exam     GEN:  Pleasant, obese, middle-aged woman.  Appears appropriate for stated age.  WD/WN/WH.  Sitting up in bed on RA.  NAD.  NEURO:  Brainstem reflexes intact.  No obvious focal deficit.  Moves all 4 ext.  HEENT:  N/AT.  PERRL.  MMM.  Oropharynx " non-erythematous.  No drainage from the eyes/ears/nose.  No conjunctival petechiae.  No oral thrush.  Auditory and visual acuity grossly wnl.  Good dentition.  Voice normal.  NECK:  Supple, NT, trachea midline.  No meningismus.  No ROM limitation.  No torticollis.  No JVD.  No thyromegaly.    CHEST/LUNGS:  Breath sounds are clear and equal bilaterally.  No w/r/r.  Chest excursion equal bilaterally.    CARDIOVASCULAR:  RRR w/o murmur noted.    GI:  Abdomen soft, appropriately tender at incision sites, ND, +BS.  No HSM.  :  Deferred.  EXTREMITIES:  No deformity or amputation.  No cyanosis, edema, or asymmetry.  Pulses 2+ and equal in BLE's.    SKIN:  Warm, dry, and pink.  No rash, breakdown, or track marks noted.  LYMPHATICS/HEME:  No overt LAD or abnormal bruising.  No lymphedema.  MSK:  Normal ROM.  No joint abnormalities noted.  Strength is 5/5 and equal in BUE and BLE's.  PSYCH:  Pleasant.  A&Ox 3.  Normal mood and affect.  Responds appropriately to commands and appears to comprehend instructions.            Disposition: Home     Discharged Condition: good    Activity: no heavy lifting for 2 weeks    Diet:  Diet Order   Procedures    Diet: Regular/House Diet; Texture: Regular Texture (IDDSI 7); Fluid Consistency: Thin (IDDSI 0)        Labs:    Results from last 7 days   Lab Units 02/07/24 0339 02/06/24 0515 02/05/24  0324 02/04/24  0557 02/03/24  1338   WBC 10*3/mm3 10.90* 9.30 10.20 11.20* 10.10   HEMOGLOBIN g/dL 11.3* 12.4 11.5* 12.1 13.1   HEMATOCRIT % 34.5 38.1 34.4 37.6 39.9   PLATELETS 10*3/mm3 362 349 321 371 422       Results from last 7 days   Lab Units 02/07/24  0339 02/06/24  0515 02/05/24  0324 02/04/24  0758 02/03/24  1338   SODIUM mmol/L 135* 139 136 138 140   POTASSIUM mmol/L 4.2 4.3 3.4* 3.8 3.5   CHLORIDE mmol/L 99 103 101 102 100   CO2 mmol/L 26.0 24.0 24.0 24.0 25.0   BUN mg/dL 14 14 17 20 18   CREATININE mg/dL 0.65 0.59 0.61 0.59 0.67                  Discharge Medications        New  Medications        Instructions Start Date   HYDROcodone-acetaminophen 7.5-325 MG per tablet  Commonly known as: NORCO   1 tablet, Oral, Every 6 Hours PRN             Continue These Medications        Instructions Start Date   albuterol sulfate  (90 Base) MCG/ACT inhaler  Commonly known as: PROVENTIL HFA;VENTOLIN HFA;PROAIR HFA   2 puffs, Inhalation, Every 6 Hours PRN      aspirin 81 MG EC tablet   81 mg, Oral, Daily      benzonatate 100 MG capsule  Commonly known as: Tessalon Perles   100 mg, Oral, 3 Times Daily PRN      calcium carbonate 600 MG tablet  Commonly known as: OS-JEANA   600 mg, Oral, Daily      Cholecalciferol 50 MCG (2000 UT) capsule   2,000 Int'l Units, Oral, Daily      ferrous sulfate 325 (65 FE) MG tablet   325 mg, Oral, Daily With Breakfast      gabapentin 100 MG capsule  Commonly known as: NEURONTIN   100 mg, Oral, Nightly      hydrALAZINE 50 MG tablet  Commonly known as: APRESOLINE   75 mg, Oral, 2 Times Daily      hydroCHLOROthiazide 50 MG tablet  Commonly known as: HYDRODIURIL   50 mg, Oral, Daily      hydrOXYzine 25 MG tablet  Commonly known as: ATARAX   25 mg, Oral, Nightly PRN      Potassium 99 MG tablet   1 tablet, Oral, 2 Times Daily      Probiotic Blend capsule   1 capsule, Oral, Daily      rOPINIRole 0.5 MG tablet  Commonly known as: REQUIP   1 mg, Oral, Nightly, Take 1 hour before bedtime.      triamcinolone 0.1 % cream  Commonly known as: KENALOG   1 Application, Topical, 2 Times Daily      zinc gluconate 50 MG tablet   50 mg, Oral, Daily                Spent at least 37 minutes in the management of patient's care including but not limited to physical exam, review of vital signs, labs, cultures and imaging studies, discussing the hospital stay along with plan of care at home, preparation and coordinating of discharge, arranging follow up care and referrals as indicated. Plan also discussed with ESTEPHANIE.    Mike Tom,   Critical Care  02/07/24   13:16 EST

## 2024-02-07 NOTE — PROGRESS NOTES
[facility] MEDICINE SERVICE  DAILY PROGRESS NOTE    NAME: Scarlet Gong  : 1955  MRN: [id number]      LOS: 0 days     PROVIDER OF SERVICE: Gino Medrano MD    Chief Complaint: Pancreatitis    Subjective:     Interval History: Patient underwent laparoscopic cholecystectomy earlier today.  She is fairly sore but overall feeling better.  She has already tolerated p.o. diet this evening.    Review of Systems:   Review of Systems    Objective:     Vital Signs  Temp:  [97.5 °F (36.4 °C)-98.6 °F (37 °C)] 97.5 °F (36.4 °C)  Heart Rate:  [64-79] 79  Resp:  [14-22] 21  BP: (125-177)/(51-78) 143/58  Flow (L/min):  [2-5] 2   Body mass index is 38.59 kg/m².    Physical Exam  Physical Exam  General Appearance:  Alert, cooperative, no distress, appears stated age  Head:  Normocephalic, without obvious abnormality, atraumatic  Eyes:  PERRL, conjunctiva/corneas clear, EOM's intact, fundi benign, both eyes  Ears:  Normal TM's and external ear canals, both ears  Nose: Nares normal, septum midline, mucosa normal, no drainage or sinus tenderness  Throat: Lips, mucosa, and tongue normal; teeth and gums normal  Neck: Supple, symmetrical, trachea midline, no adenopathy, thyroid: not enlarged, symmetric, no tenderness/mass/nodules, no carotid bruit or JVD  Lungs:   Clear to auscultation bilaterally, respirations unlabored  Heart:  Regular rate and rhythm, S1, S2 normal, no murmur, rub or gallop  Abdomen:  Soft, non-tender, bowel sounds active all four quadrants,  no masses, no organomegaly, laparoscopic incisions intact  Extremities: Extremities normal, atraumatic, no cyanosis or edema  Pulses: 2+ and symmetric  Skin: Skin color, texture, turgor normal, no rashes or lesions  Neurologic: Normal      Scheduled Meds   ferrous sulfate, 324 mg, Oral, Daily With Breakfast  gabapentin, 100 mg, Oral, Nightly  hydrALAZINE, 75 mg, Oral, BID  hydroCHLOROthiazide, 50 mg, Oral, Daily  polyethylene glycol, 17 g, Oral,  Daily  rOPINIRole, 1 mg, Oral, Nightly  senna-docusate sodium, 2 tablet, Oral, BID  sodium chloride, 10 mL, Intravenous, Q12H  ursodiol, 500 mg, Oral, Q12H       PRN Meds     acetaminophen    albuterol    benzonatate    senna-docusate sodium **AND** [DISCONTINUED] polyethylene glycol **AND** bisacodyl **AND** bisacodyl    Calcium Replacement - Follow Nurse / BPA Driven Protocol    hydrOXYzine    Magnesium Standard Dose Replacement - Follow Nurse / BPA Driven Protocol    melatonin    ondansetron    oxyCODONE    Phosphorus Replacement - Follow Nurse / BPA Driven Protocol    Potassium Replacement - Follow Nurse / BPA Driven Protocol    [COMPLETED] Insert Peripheral IV **AND** sodium chloride    sodium chloride    sodium chloride   Infusions  sodium chloride, 100 mL/hr, Last Rate: Stopped (02/06/24 1545)          Diagnostic Data    Results from last 7 days   Lab Units 02/06/24  0515   WBC 10*3/mm3 9.30   HEMOGLOBIN g/dL 12.4   HEMATOCRIT % 38.1   PLATELETS 10*3/mm3 349   GLUCOSE mg/dL 130*   CREATININE mg/dL 0.59   BUN mg/dL 14   SODIUM mmol/L 139   POTASSIUM mmol/L 4.3   AST (SGOT) U/L 42*   ALT (SGPT) U/L 243*   ALK PHOS U/L 414*   BILIRUBIN mg/dL 0.5   ANION GAP mmol/L 12.0       FL Cholangiogram Operative    Result Date: 2/6/2024  Impression: No evidence of retained common duct stones. Electronically Signed: Garcia Mo MD  2/6/2024 4:22 PM EST  Workstation ID: [id number]       I reviewed the patient's new clinical results.    Assessment/Plan:     Active and Resolved Problems  Active Hospital Problems    Diagnosis  POA    **Pancreatitis [K85.90]  Yes    Acute pancreatitis [K85.90]  Yes      Resolved Hospital Problems   No resolved problems to display.       Acute gallstone pancreatitis  -Clinically improving  -Patient underwent laparoscopic cholecystectomy today with intraoperative cholangiogram and no significant complications  -Epigastric pain improving  -Lipase almost normalized  -Continue diet as  tolerated    Acute cholecystitis with cholelithiasis  -Status post cholecystectomy on 2/6  -Doing well postoperatively and tolerating p.o. intake  -Encourage ambulation  -Itching improved; this was likely secondary to her cholecystitis with elevated bilirubin    Hypertension  -Continue home medications including hydralazine, HCTZ    Obesity  -Counseled patient on diet and exercise to improve weight loss and comorbid conditions    DVT prophylaxis:  Mechanical DVT prophylaxis orders are present.         Code status is   Code Status and Medical Interventions:   Ordered at: 02/03/24 7706     Level Of Support Discussed With:    Patient     Code Status (Patient has no pulse and is not breathing):    CPR (Attempt to Resuscitate)     Medical Interventions (Patient has pulse or is breathing):    Full Support       Plan for disposition: Likely DC in a.m.    Time: 30 minutes    Signature: Electronically signed by Gino Medrano MD, 02/06/24, 19:06 EST.  [facility] Hospitalist Team

## 2024-02-07 NOTE — PROGRESS NOTES
General Surgery Progress Note    Name: Scarlet Gong ADMIT: 2/3/2024   : 1955  PCP: Ruben Mejias MD    MRN: [id number] LOS: 1 days   AGE/SEX: 68 y.o. female  ROOM: [address] [place] [address]    Chief Complaint   Patient presents with    Itching     Subjective     68 y.o. female status post laparoscopic cholecystectomy intraoperative cholangiogram and liver biopsy on     Was pretty good.  Improving back pain.  Orders breakfast.    Objective     Scheduled Medications:   ferrous sulfate, 324 mg, Oral, Daily With Breakfast  gabapentin, 100 mg, Oral, Nightly  hydrALAZINE, 75 mg, Oral, BID  hydroCHLOROthiazide, 50 mg, Oral, Daily  polyethylene glycol, 17 g, Oral, Daily  rOPINIRole, 1 mg, Oral, Nightly  senna-docusate sodium, 2 tablet, Oral, BID  sodium chloride, 10 mL, Intravenous, Q12H  ursodiol, 500 mg, Oral, Q12H        Active Infusions:  sodium chloride, 100 mL/hr, Last Rate: Stopped (24 1545)        As Needed Medications:    acetaminophen    albuterol    benzonatate    senna-docusate sodium **AND** [DISCONTINUED] polyethylene glycol **AND** bisacodyl **AND** bisacodyl    Calcium Replacement - Follow Nurse / BPA Driven Protocol    hydrOXYzine    Magnesium Standard Dose Replacement - Follow Nurse / BPA Driven Protocol    melatonin    ondansetron    oxyCODONE    Phosphorus Replacement - Follow Nurse / BPA Driven Protocol    Potassium Replacement - Follow Nurse / BPA Driven Protocol    [COMPLETED] Insert Peripheral IV **AND** sodium chloride    sodium chloride    sodium chloride    Vital Signs  Vital Signs Patient Vitals for the past 24 hrs:   BP Temp Temp src Pulse Resp SpO2   24 0803 164/69 -- -- 72 -- --   24 0621 142/57 98.1 °F (36.7 °C) Oral -- 15 --   24 0230 141/63 97.8 °F (36.6 °C) Oral 65 19 92 %   24 2228 143/59 98.1 °F (36.7 °C) Oral 79 22 92 %   24 1818 143/58 -- -- 79 21 90 %   24 1707 173/71 -- -- 73 16 95 %   24 1701 177/77 -- -- 69 15 96 %    02/06/24 1646 174/74 -- -- 74 17 94 %   02/06/24 1633 -- -- -- -- -- 92 %   02/06/24 1631 172/66 -- -- 77 14 (!) 88 %   02/06/24 1616 137/77 -- -- 76 16 97 %   02/06/24 1611 138/78 -- -- 75 17 96 %   02/06/24 1606 151/76 -- -- 76 20 98 %   02/06/24 1601 171/66 97.5 °F (36.4 °C) Oral 77 20 97 %   02/06/24 1252 144/51 97.9 °F (36.6 °C) Oral 64 22 100 %   02/06/24 1003 151/67 -- -- 67 18 96 %       Physical Exam:  Physical Exam  Constitutional:       Appearance: Normal appearance.   HENT:      Head: Normocephalic and atraumatic.   Pulmonary:      Effort: Pulmonary effort is normal. No respiratory distress.   Abdominal:      Comments: Abdomen is soft and appropriate tender to palpation visions healing without any significant erythema or drainage   Neurological:      Mental Status: She is alert.         Results Review:     CBC    Results from last 7 days   Lab Units 02/07/24  0339 02/06/24  0515 02/05/24  0324 02/04/24  0557 02/03/24  1338   WBC 10*3/mm3 10.90* 9.30 10.20 11.20* 10.10   HEMOGLOBIN g/dL 11.3* 12.4 11.5* 12.1 13.1   PLATELETS 10*3/mm3 362 349 321 371 422     CMP   Results from last 7 days   Lab Units 02/07/24  0339 02/06/24  0515 02/05/24  0324 02/04/24  0758 02/03/24  1338   SODIUM mmol/L 135* 139 136 138 140   POTASSIUM mmol/L 4.2 4.3 3.4* 3.8 3.5   CHLORIDE mmol/L 99 103 101 102 100   CO2 mmol/L 26.0 24.0 24.0 24.0 25.0   BUN mg/dL 14 14 17 20 18   CREATININE mg/dL 0.65 0.59 0.61 0.59 0.67   GLUCOSE mg/dL 118* 130* 150* 133* 98   ALBUMIN g/dL 3.8 3.8 3.5 4.0 4.4   BILIRUBIN mg/dL 0.5 0.5 0.6 0.9 1.5*   ALK PHOS U/L 348* 414* 446* 533* 699*   AST (SGOT) U/L 65* 42* 63* 150* 415*   ALT (SGPT) U/L 190* 243* 326* 501* 798*   LIPASE U/L  --   --  129* 135* 1,065*       I reviewed the patient's new clinical results.    Assessment & Plan       Pancreatitis    Acute pancreatitis      68 y.o. female status post laparoscopic cholecystectomy intraoperative cholangiogram and liver biopsy    Okay for diet as  tolerated  From incisional standpoint she can shower, no tub soaking for 2 weeks  No lifting more than 10 to 15 pounds for 2 weeks  Wearing SCDs for DVT prophylaxis okay with DVT chemoprophylaxis if she remains in the hospital for the next couple days.         This note was created using Dragon Voice Recognition software.    Tom Hernandez MD  02/07/24  08:52 EST

## 2024-02-08 ENCOUNTER — TELEPHONE (OUTPATIENT)
Dept: SURGERY | Facility: CLINIC | Age: 69
End: 2024-02-08
Payer: MEDICARE

## 2024-02-08 ENCOUNTER — NURSE TRIAGE (OUTPATIENT)
Dept: CALL CENTER | Facility: HOSPITAL | Age: 69
End: 2024-02-08
Payer: MEDICARE

## 2024-02-08 NOTE — TELEPHONE ENCOUNTER
PO FU Call- spoke with pt. [facility] 2 wk po appt. Will fu then. Knows to call with any questions or concerns.      States doing well having some pain. Having some itching. States was supposed to have an itch med sent in. Informed pt to reach out to pcp.

## 2024-02-08 NOTE — TELEPHONE ENCOUNTER
"Call transferred to Dr. Tom Hernandez's, , office for rescheduling of appointment.    Reason for Disposition   Information only question and nurse able to answer    Additional Information   Negative: Nursing judgment   Negative: Nursing judgment   Negative: Nursing judgment   Negative: Nursing judgment    Answer Assessment - Initial Assessment Questions  1. REASON FOR CALL or QUESTION: \"What is your reason for calling today?\" or \"How can I best help you?\" or \"What question do you have that I can help answer?\"      Requesting to change f/u appointment from 02/22/2024 to something sooner due to work schedule    Protocols used: No Protocol Available - Information Only-ADULT-[place]    "

## 2024-02-08 NOTE — OUTREACH NOTE
Prep Survey      Flowsheet Row Responses   [facility]   Is LACE score < 7 ? No   Eligibility Readm Mgmt   Discharge diagnosis CHOLECYSTECTOMY LAPAROSCOPIC INTRAOPERATIVE CHOLANGIOGRAM liver biopsy   Does the patient have one of the following disease processes/diagnoses(primary or secondary)? General Surgery   Does the patient have Home health ordered? No   Is there a DME ordered? No   Prep survey completed? Yes            Migdalia HENSON - Registered Nurse

## 2024-02-13 LAB
CYTO UR: NORMAL
LAB AP CASE REPORT: NORMAL
PATH REPORT.FINAL DX SPEC: NORMAL
PATH REPORT.GROSS SPEC: NORMAL

## 2024-02-14 ENCOUNTER — READMISSION MANAGEMENT (OUTPATIENT)
Dept: CALL CENTER | Facility: HOSPITAL | Age: 69
End: 2024-02-14
Payer: MEDICARE

## 2024-02-22 ENCOUNTER — OFFICE VISIT (OUTPATIENT)
Dept: SURGERY | Facility: CLINIC | Age: 69
End: 2024-02-22
Payer: MEDICARE

## 2024-02-22 VITALS
WEIGHT: 213 LBS | RESPIRATION RATE: 18 BRPM | BODY MASS INDEX: 39.2 KG/M2 | OXYGEN SATURATION: 96 % | DIASTOLIC BLOOD PRESSURE: 76 MMHG | TEMPERATURE: 98.6 F | HEIGHT: 62 IN | HEART RATE: 71 BPM | SYSTOLIC BLOOD PRESSURE: 138 MMHG

## 2024-02-22 DIAGNOSIS — K85.10 ACUTE GALLSTONE PANCREATITIS: Primary | ICD-10-CM

## 2024-02-22 PROCEDURE — 1159F MED LIST DOCD IN RCRD: CPT | Performed by: SURGERY

## 2024-02-22 PROCEDURE — 99024 POSTOP FOLLOW-UP VISIT: CPT | Performed by: SURGERY

## 2024-02-22 PROCEDURE — 3075F SYST BP GE 130 - 139MM HG: CPT | Performed by: SURGERY

## 2024-02-22 PROCEDURE — 1160F RVW MEDS BY RX/DR IN RCRD: CPT | Performed by: SURGERY

## 2024-02-22 PROCEDURE — 3078F DIAST BP <80 MM HG: CPT | Performed by: SURGERY

## 2024-04-01 LAB
CYTO UR: NORMAL
LAB AP CASE REPORT: NORMAL
PATH REPORT.ADDENDUM SPEC: NORMAL
PATH REPORT.FINAL DX SPEC: NORMAL
PATH REPORT.GROSS SPEC: NORMAL

## 2024-04-23 ENCOUNTER — TRANSCRIBE ORDERS (OUTPATIENT)
Dept: ADMINISTRATIVE | Facility: HOSPITAL | Age: 69
End: 2024-04-23
Payer: MEDICARE

## 2024-04-23 DIAGNOSIS — Z12.31 SCREENING MAMMOGRAM FOR BREAST CANCER: Primary | ICD-10-CM

## 2024-04-25 ENCOUNTER — OFFICE (OUTPATIENT)
Dept: URBAN - METROPOLITAN AREA CLINIC 64 | Facility: CLINIC | Age: 69
End: 2024-04-25
Payer: MEDICAID

## 2024-04-25 VITALS
HEART RATE: 70 BPM | DIASTOLIC BLOOD PRESSURE: 80 MMHG | SYSTOLIC BLOOD PRESSURE: 167 MMHG | DIASTOLIC BLOOD PRESSURE: 79 MMHG | HEIGHT: 62 IN | WEIGHT: 225 LBS

## 2024-04-25 DIAGNOSIS — G47.33 OBSTRUCTIVE SLEEP APNEA (ADULT) (PEDIATRIC): ICD-10-CM

## 2024-04-25 DIAGNOSIS — D50.9 IRON DEFICIENCY ANEMIA, UNSPECIFIED: ICD-10-CM

## 2024-04-25 DIAGNOSIS — R60.0 LOCALIZED EDEMA: ICD-10-CM

## 2024-04-25 DIAGNOSIS — K91.5 POSTCHOLECYSTECTOMY SYNDROME: ICD-10-CM

## 2024-04-25 DIAGNOSIS — K73.9 CHRONIC HEPATITIS, UNSPECIFIED: ICD-10-CM

## 2024-04-25 PROCEDURE — 99214 OFFICE O/P EST MOD 30 MIN: CPT | Performed by: NURSE PRACTITIONER

## 2024-04-25 RX ORDER — COLESTIPOL HYDROCHLORIDE 1 G/1
4 TABLET, FILM COATED ORAL
Qty: 360 | Refills: 3 | Status: ACTIVE
Start: 2019-07-10

## 2024-06-07 ENCOUNTER — HOSPITAL ENCOUNTER (OUTPATIENT)
Dept: MAMMOGRAPHY | Facility: HOSPITAL | Age: 69
Discharge: HOME | End: 2024-06-07
Payer: MEDICARE

## 2024-06-07 DIAGNOSIS — Z12.31 SCREENING MAMMOGRAM FOR BREAST CANCER: ICD-10-CM

## 2024-06-07 PROCEDURE — 77067 SCR MAMMO BI INCL CAD: CPT

## 2024-06-07 PROCEDURE — 77063 BREAST TOMOSYNTHESIS BI: CPT

## 2024-07-26 ENCOUNTER — OFFICE (OUTPATIENT)
Dept: URBAN - METROPOLITAN AREA CLINIC 64 | Facility: CLINIC | Age: 69
End: 2024-07-26
Payer: MEDICARE

## 2024-07-26 VITALS
HEART RATE: 84 BPM | SYSTOLIC BLOOD PRESSURE: 135 MMHG | DIASTOLIC BLOOD PRESSURE: 72 MMHG | HEIGHT: 62 IN | WEIGHT: 212 LBS

## 2024-07-26 DIAGNOSIS — K74.1 HEPATIC SCLEROSIS: ICD-10-CM

## 2024-07-26 DIAGNOSIS — D50.9 IRON DEFICIENCY ANEMIA, UNSPECIFIED: ICD-10-CM

## 2024-07-26 DIAGNOSIS — Z86.010 PERSONAL HISTORY OF COLONIC POLYPS: ICD-10-CM

## 2024-07-26 DIAGNOSIS — K91.5 POSTCHOLECYSTECTOMY SYNDROME: ICD-10-CM

## 2024-07-26 PROCEDURE — 99214 OFFICE O/P EST MOD 30 MIN: CPT | Performed by: NURSE PRACTITIONER

## 2024-08-23 ENCOUNTER — ON CAMPUS - OUTPATIENT (OUTPATIENT)
Dept: URBAN - METROPOLITAN AREA HOSPITAL 85 | Facility: HOSPITAL | Age: 69
End: 2024-08-23
Payer: MEDICARE

## 2024-08-23 ENCOUNTER — HOSPITAL ENCOUNTER (OUTPATIENT)
Facility: HOSPITAL | Age: 69
Setting detail: HOSPITAL OUTPATIENT SURGERY
Discharge: HOME | End: 2024-08-23
Attending: INTERNAL MEDICINE | Admitting: INTERNAL MEDICINE
Payer: MEDICARE

## 2024-08-23 ENCOUNTER — ON CAMPUS - OUTPATIENT (OUTPATIENT)
Age: 69
End: 2024-08-23
Payer: MEDICARE

## 2024-08-23 ENCOUNTER — ANESTHESIA (OUTPATIENT)
Dept: GASTROENTEROLOGY | Facility: HOSPITAL | Age: 69
End: 2024-08-23
Payer: MEDICARE

## 2024-08-23 ENCOUNTER — ANESTHESIA EVENT (OUTPATIENT)
Dept: GASTROENTEROLOGY | Facility: HOSPITAL | Age: 69
End: 2024-08-23
Payer: MEDICARE

## 2024-08-23 VITALS
DIASTOLIC BLOOD PRESSURE: 79 MMHG | HEIGHT: 62 IN | OXYGEN SATURATION: 99 % | WEIGHT: 198.63 LBS | BODY MASS INDEX: 36.55 KG/M2 | HEART RATE: 67 BPM | RESPIRATION RATE: 22 BRPM | SYSTOLIC BLOOD PRESSURE: 136 MMHG | TEMPERATURE: 98.9 F

## 2024-08-23 DIAGNOSIS — K63.5 POLYP OF COLON: ICD-10-CM

## 2024-08-23 DIAGNOSIS — D12.2 BENIGN NEOPLASM OF ASCENDING COLON: ICD-10-CM

## 2024-08-23 DIAGNOSIS — Z86.010 PERSONAL HISTORY OF COLON POLYPS: ICD-10-CM

## 2024-08-23 DIAGNOSIS — K29.50 UNSPECIFIED CHRONIC GASTRITIS WITHOUT BLEEDING: ICD-10-CM

## 2024-08-23 DIAGNOSIS — D50.9 IRON DEFICIENCY ANEMIA, UNSPECIFIED: ICD-10-CM

## 2024-08-23 DIAGNOSIS — R07.9 CHEST PAIN: ICD-10-CM

## 2024-08-23 DIAGNOSIS — R19.5 HIGH STOOL BILE ACIDS: ICD-10-CM

## 2024-08-23 DIAGNOSIS — R13.10 DYSPHAGIA: ICD-10-CM

## 2024-08-23 DIAGNOSIS — K63.5 COLON POLYPS: ICD-10-CM

## 2024-08-23 PROCEDURE — 45380 COLONOSCOPY AND BIOPSY: CPT | Mod: 59 | Performed by: INTERNAL MEDICINE

## 2024-08-23 PROCEDURE — 43239 EGD BIOPSY SINGLE/MULTIPLE: CPT | Performed by: INTERNAL MEDICINE

## 2024-08-23 PROCEDURE — 45385 COLONOSCOPY W/LESION REMOVAL: CPT | Performed by: INTERNAL MEDICINE

## 2024-08-23 PROCEDURE — 25010000002 PROPOFOL 200 MG/20ML EMULSION: Performed by: NURSE ANESTHETIST, CERTIFIED REGISTERED

## 2024-08-23 PROCEDURE — 88305 TISSUE EXAM BY PATHOLOGIST: CPT | Performed by: INTERNAL MEDICINE

## 2024-08-23 PROCEDURE — 25010000002 HYDRALAZINE PER 20 MG: Performed by: NURSE ANESTHETIST, CERTIFIED REGISTERED

## 2024-08-23 PROCEDURE — 25810000003 SODIUM CHLORIDE 0.9 % SOLUTION: Performed by: NURSE ANESTHETIST, CERTIFIED REGISTERED

## 2024-08-23 RX ORDER — LIDOCAINE HYDROCHLORIDE 20 MG/ML
INJECTION, SOLUTION EPIDURAL; INFILTRATION; INTRACAUDAL; PERINEURAL AS NEEDED
Status: DISCONTINUED | OUTPATIENT
Start: 2024-08-23 | End: 2024-08-23 | Stop reason: SURG

## 2024-08-23 RX ORDER — PROPOFOL 10 MG/ML
INJECTION, EMULSION INTRAVENOUS AS NEEDED
Status: DISCONTINUED | OUTPATIENT
Start: 2024-08-23 | End: 2024-08-23 | Stop reason: SURG

## 2024-08-23 RX ORDER — HYDRALAZINE HYDROCHLORIDE 20 MG/ML
INJECTION INTRAMUSCULAR; INTRAVENOUS AS NEEDED
Status: DISCONTINUED | OUTPATIENT
Start: 2024-08-23 | End: 2024-08-23 | Stop reason: SURG

## 2024-08-23 RX ORDER — SODIUM CHLORIDE 9 MG/ML
INJECTION, SOLUTION INTRAVENOUS CONTINUOUS PRN
Status: DISCONTINUED | OUTPATIENT
Start: 2024-08-23 | End: 2024-08-23 | Stop reason: SURG

## 2024-08-23 RX ORDER — ONDANSETRON 2 MG/ML
4 INJECTION INTRAMUSCULAR; INTRAVENOUS ONCE AS NEEDED
Status: DISCONTINUED | OUTPATIENT
Start: 2024-08-23 | End: 2024-08-23 | Stop reason: HOSPADM

## 2024-08-23 RX ORDER — SODIUM CHLORIDE 9 MG/ML
30 INJECTION, SOLUTION INTRAVENOUS CONTINUOUS PRN
Status: DISCONTINUED | OUTPATIENT
Start: 2024-08-23 | End: 2024-08-23 | Stop reason: HOSPADM

## 2024-08-23 RX ADMIN — SODIUM CHLORIDE: 9 INJECTION, SOLUTION INTRAVENOUS at 09:33

## 2024-08-23 RX ADMIN — HYDRALAZINE HYDROCHLORIDE 10 MG: 20 INJECTION INTRAMUSCULAR; INTRAVENOUS at 09:39

## 2024-08-23 RX ADMIN — PROPOFOL 50 MG: 10 INJECTION, EMULSION INTRAVENOUS at 09:58

## 2024-08-23 RX ADMIN — LIDOCAINE HYDROCHLORIDE 50 MG: 20 INJECTION, SOLUTION EPIDURAL; INFILTRATION; INTRACAUDAL; PERINEURAL at 09:41

## 2024-08-23 RX ADMIN — PROPOFOL 50 MG: 10 INJECTION, EMULSION INTRAVENOUS at 10:04

## 2024-08-23 RX ADMIN — PROPOFOL 100 MG: 10 INJECTION, EMULSION INTRAVENOUS at 09:41

## 2024-08-23 RX ADMIN — SODIUM CHLORIDE: 9 INJECTION, SOLUTION INTRAVENOUS at 09:41

## 2024-08-23 RX ADMIN — PROPOFOL 25 MG: 10 INJECTION, EMULSION INTRAVENOUS at 09:51

## 2024-08-23 RX ADMIN — PROPOFOL 50 MG: 10 INJECTION, EMULSION INTRAVENOUS at 10:07

## 2024-08-23 RX ADMIN — PROPOFOL 25 MG: 10 INJECTION, EMULSION INTRAVENOUS at 09:46

## 2024-08-23 RX ADMIN — PROPOFOL 50 MG: 10 INJECTION, EMULSION INTRAVENOUS at 09:54

## 2024-08-23 RX ADMIN — PROPOFOL 50 MG: 10 INJECTION, EMULSION INTRAVENOUS at 10:11

## 2024-08-23 RX ADMIN — PROPOFOL 50 MG: 10 INJECTION, EMULSION INTRAVENOUS at 10:01

## 2024-08-23 NOTE — ANESTHESIA POSTPROCEDURE EVALUATION
Patient: Scarlet Gong    Procedure Summary       Date: 08/23/24 Room / Location: [facility] ENDOSCOPY 1 / [facility] ENDOSCOPY    Anesthesia Start: 0933 Anesthesia Stop: 1019    Procedures:       COLONOSCOPY with POLYPECTOMY X2 (COLD SNARE)      ESOPHAGOGASTRODUODENOSCOPY with BIOPSY Diagnosis:       Colon polyps      High stool bile acids      Chest pain      Dysphagia      (POLYPS IN PROXIMAL RECTUM, BILE ACID DIARRHEA)    Surgeons: Bismark Cartagena MD Provider: Georgi Jiménez MD    Anesthesia Type: general, MAC ASA Status: 3            Anesthesia Type: general, MAC    Vitals  Vitals Value Taken Time   /79 08/23/24 1040   Temp     Pulse 67 08/23/24 1040   Resp 22 08/23/24 1040   SpO2 99 % 08/23/24 1040           Post Anesthesia Care and Evaluation    Patient location during evaluation: PACU  Patient participation: complete - patient participated  Level of consciousness: awake  Pain scale: See nurse's notes for pain score.  Pain management: adequate    Airway patency: patent  Anesthetic complications: No anesthetic complications  PONV Status: none  Cardiovascular status: acceptable  Respiratory status: acceptable and spontaneous ventilation  Hydration status: acceptable    Comments: Patient seen and examined postoperatively; vital signs stable; SpO2 greater than or equal to 90%; cardiopulmonary status stable; nausea/vomiting adequately controlled; pain adequately controlled; no apparent anesthesia complications; patient discharged from anesthesia care when discharge criteria were met

## 2024-08-23 NOTE — DISCHARGE INSTRUCTIONS
A responsible adult should stay with you and you should rest quietly for the rest of the day.    Do not drink alcohol, drive, operate any heavy machinery or power tools or make any legal/important decisions for the next 24 hours.     Progress your diet as tolerated.  If you begin to experience severe pain, increased shortness of breath, racing heartbeat or a fever above 101 F, seek immediate medical attention.     Follow up with MD as instructed. Call office for results in 3 to 5 days if needed.     Dr Cartagena @ [phone]        Impression:  Normal EGD  Colon polyps x 2     Recommendations:  Follow-up histopathology  High-fiber diet  Repeat colonoscopy in 7 years of polyps are adenomatous, 10 years if they are hyperplastic        Bismark Cartagena MD      Date: 8/23/2024  Time: 10:18 EDT

## 2024-08-23 NOTE — OP NOTE
COLONOSCOPY, ESOPHAGOGASTRODUODENOSCOPY Procedure Report    Patient Name:  Scarlet Gong  YOB: 1955    Date of Surgery:  8/23/2024     Pre-Op Diagnosis:  Iron deficiency anemia and personal history of colon polyps    Post Op Diagnosis:  Normal EGD, colon polyps x 2      Procedure/CPT® Codes:      Procedure(s):  COLONOSCOPY with POLYPECTOMY X2 (COLD SNARE)  ESOPHAGOGASTRODUODENOSCOPY with BIOPSY    Staff:  Surgeon(s):  Bismark Cartagena MD         Anesthesia: Monitored Anesthesia Care    Implants:    Nothing was implanted during the procedure    Specimen:        See Below    No blood loss    Complications:  None     Description of Procedure:  Informed consent was obtained for the procedure, including sedation.  Risks of perforation, hemorrhage, adverse drug reaction and aspiration were discussed.  The patient was brought into the endoscopy suite. Continuous cardiopulmonary monitoring was performed. The patient was placed in the left lateral decubitus position.  The bite block was inserted into the patient's mouth. After adequate sedation was attained, the Olympus gastroscope was inserted into the patient's mouth and advanced to the second portion of the duodenum without difficulty.  Circumferential examination was performed. A retroflex exam was performed in the patient's stomach.  On completion of the exam, the bowel was decompressed, the scope was removed from the patient, the patient tolerated the procedure well, there were no immediate post-operative complications.     Examination of the esophagus showed normal mucosa  Examination of the stomach showed normal mucosa.  Biopsies were obtained from the gastric antrum body and sent for pathology to evaluate for H. pylori  Retroflex examination of the stomach was normal   Examination of the duodenum showed normal mucosa.  Biopsies were obtained from the second portion of the small bowel and the duodenal bulb and sent for pathology evaluate for  celiac disease    Subsequently, the colonoscopy was performed with the patient in the left lateral decubitus position. The digital rectal exam was performed which was normal.  Subsequently, the Olympus colonoscope was inserted into the patient's rectum and advanced to the level of the cecum without difficulty.  The terminal ileum could not be intubated due to colon looping.  The bowel prep was excellent.  Circumferential examination of the patient's colon was performed on scope withdrawal.  A retroflex exam was performed in the rectum which showed normal mucosa.  The bowel was decompressed, the scope was withdrawn from the patient, and the patient tolerated the procedure well. There were no immediate post-operative complications.     Findings:    Ascending colon polyp x 1, 5 mm in size, removed in single piece fashion with cold snare polypectomy  Hepatic flexure polyp x 1, 3 mm in size, removed in piecemeal fashion with cold forceps polypectomy        Impression:  Normal EGD  Colon polyps x 2    Recommendations:  Follow-up histopathology  High-fiber diet  Repeat colonoscopy in 7 years of polyps are adenomatous, 10 years if they are hyperplastic      Bismark Cartagena MD     Date: 8/23/2024  Time: 10:18 EDT

## 2024-08-23 NOTE — ANESTHESIA PREPROCEDURE EVALUATION
Anesthesia Evaluation     Patient summary reviewed and Nursing notes reviewed   NPO Solid Status: > 8 hours  NPO Liquid Status: > 8 hours           Airway   Mallampati: II  TM distance: >3 FB  Neck ROM: full  No difficulty expected  Dental - normal exam     Pulmonary    (+) asthma,shortness of breath, sleep apnea  Cardiovascular     (+) hypertension, hyperlipidemia      Neuro/Psych  (+) psychiatric history  GI/Hepatic/Renal/Endo    (+) morbid obesity    Musculoskeletal     Abdominal    Substance History      OB/GYN          Other            Phys Exam Other:   Left ventricular systolic function is normal. Calculated left ventricular EF = 59% Left ventricular ejection fraction appears to be 56 - 60%.  ·  Left ventricular diastolic function is consistent with (grade I) impaired relaxation.  ·  Estimated right ventricular systolic pressure from tricuspid regurgitation is normal (<35 mmHg).  ·  No significant valvular abnormalities.                 Anesthesia Plan    ASA 3     general and MAC     intravenous induction     Anesthetic plan, risks, benefits, and alternatives have been provided, discussed and informed consent has been obtained with: patient.    Plan discussed with CRNA.    CODE STATUS:

## 2024-08-23 NOTE — H&P
" LOS: 0 days   Patient Care Team:  Ruben Mejias MD as PCP - General (Family Medicine)  Hien Camacho MD as Consulting Physician (Cardiology)  Tom Hernandez MD as Consulting Physician (General Surgery)      Subjective     Interval History:     Subjective: LEEANNE, h/o polyps      ROS:   No chest pain, shortness of breath, or cough.        Medication Review:   No current facility-administered medications for this encounter.      Objective     Vital Signs  Vitals:    08/14/24 1036 08/23/24 0831   BP:  176/70   BP Location:  Left arm   Patient Position:  Sitting   Pulse:  65   Resp:  16   Temp:  98.9 °F (37.2 °C)   TempSrc:  Oral   SpO2:  98%   Weight: 90.7 kg (200 lb) 90.1 kg (198 lb 10.2 oz)   Height: 157.5 cm (62\") 157.5 cm (62\")       Physical Exam:    General Appearance:    Awake and alert, in no acute distress   Head:    Normocephalic, without obvious abnormality   Eyes:          Conjunctivae normal, anicteric sclerae   Throat:   No oral lesions, no thrush, oral mucosa moist   Neck:   No adenopathy, supple, no JVD   Lungs:     respirations regular, even and unlabored   Abdomen:     Soft, non-yuridia, no rebound or guarding, nondistended, no hepatosplenomegaly   Rectal:     Deferred   Extremities:   No edema, no cyanosis   Skin:   No bruising or rash, no jaundice        Results Review:    Lab Results (last 24 hours)       ** No results found for the last 24 hours. **            Imaging Results (Last 24 Hours)       ** No results found for the last 24 hours. **              Assessment & Plan   Proceed with scope and MAC anesthesia        Bismark Cartagena MD  08/23/24  09:36 EDT  "

## 2024-08-26 LAB
LAB AP CASE REPORT: NORMAL
PATH REPORT.FINAL DX SPEC: NORMAL
PATH REPORT.GROSS SPEC: NORMAL

## 2024-11-25 ENCOUNTER — HOSPITAL ENCOUNTER (OUTPATIENT)
Facility: HOSPITAL | Age: 69
Setting detail: OBSERVATION
Discharge: HOME | End: 2024-11-26
Attending: EMERGENCY MEDICINE | Admitting: EMERGENCY MEDICINE
Payer: MEDICARE

## 2024-11-25 ENCOUNTER — APPOINTMENT (OUTPATIENT)
Dept: GENERAL RADIOLOGY | Facility: HOSPITAL | Age: 69
End: 2024-11-25
Payer: MEDICARE

## 2024-11-25 DIAGNOSIS — R07.9 CHEST PAIN, UNSPECIFIED TYPE: Primary | ICD-10-CM

## 2024-11-25 LAB
ALBUMIN SERPL-MCNC: 4.1 G/DL (ref 3.5–5.2)
ALBUMIN/GLOB SERPL: 1.6 G/DL
ALP SERPL-CCNC: 83 U/L (ref 39–117)
ALT SERPL W P-5'-P-CCNC: 19 U/L (ref 1–33)
ANION GAP SERPL CALCULATED.3IONS-SCNC: 10.9 MMOL/L (ref 5–15)
APTT PPP: 33.5 SECONDS (ref 22.7–35.4)
AST SERPL-CCNC: 28 U/L (ref 1–32)
BASOPHILS # BLD AUTO: 0.03 10*3/MM3 (ref 0–0.2)
BASOPHILS NFR BLD AUTO: 0.4 % (ref 0–1.5)
BILIRUB SERPL-MCNC: 0.2 MG/DL (ref 0–1.2)
BUN SERPL-MCNC: 16 MG/DL (ref 8–23)
BUN/CREAT SERPL: 20.8 (ref 7–25)
CALCIUM SPEC-SCNC: 9.6 MG/DL (ref 8.6–10.5)
CHLORIDE SERPL-SCNC: 103 MMOL/L (ref 98–107)
CHOLEST SERPL-MCNC: 145 MG/DL (ref 0–200)
CO2 SERPL-SCNC: 25.1 MMOL/L (ref 22–29)
CREAT SERPL-MCNC: 0.77 MG/DL (ref 0.57–1)
D DIMER PPP FEU-MCNC: 0.64 MCGFEU/ML (ref 0–0.68)
DEPRECATED RDW RBC AUTO: 47.3 FL (ref 37–54)
EGFRCR SERPLBLD CKD-EPI 2021: 84.1 ML/MIN/1.73
EOSINOPHIL # BLD AUTO: 0.27 10*3/MM3 (ref 0–0.4)
EOSINOPHIL NFR BLD AUTO: 3.2 % (ref 0.3–6.2)
ERYTHROCYTE [DISTWIDTH] IN BLOOD BY AUTOMATED COUNT: 14.4 % (ref 12.3–15.4)
FLUAV RNA RESP QL NAA+PROBE: NOT DETECTED
FLUBV RNA RESP QL NAA+PROBE: NOT DETECTED
GEN 5 2HR TROPONIN T REFLEX: 10 NG/L
GLOBULIN UR ELPH-MCNC: 2.6 GM/DL
GLUCOSE SERPL-MCNC: 124 MG/DL (ref 65–99)
HBA1C MFR BLD: 6.76 % (ref 4.8–5.6)
HCT VFR BLD AUTO: 35.8 % (ref 34–46.6)
HDLC SERPL-MCNC: 32 MG/DL (ref 40–60)
HGB BLD-MCNC: 11.5 G/DL (ref 12–15.9)
HOLD SPECIMEN: NORMAL
HOLD SPECIMEN: NORMAL
IMM GRANULOCYTES # BLD AUTO: 0.03 10*3/MM3 (ref 0–0.05)
IMM GRANULOCYTES NFR BLD AUTO: 0.4 % (ref 0–0.5)
INR PPP: 0.99 (ref 0.9–1.1)
LDLC SERPL CALC-MCNC: 78 MG/DL (ref 0–100)
LDLC/HDLC SERPL: 2.23 {RATIO}
LYMPHOCYTES # BLD AUTO: 2.51 10*3/MM3 (ref 0.7–3.1)
LYMPHOCYTES NFR BLD AUTO: 29.8 % (ref 19.6–45.3)
MCH RBC QN AUTO: 29.1 PG (ref 26.6–33)
MCHC RBC AUTO-ENTMCNC: 32.1 G/DL (ref 31.5–35.7)
MCV RBC AUTO: 90.6 FL (ref 79–97)
MONOCYTES # BLD AUTO: 0.6 10*3/MM3 (ref 0.1–0.9)
MONOCYTES NFR BLD AUTO: 7.1 % (ref 5–12)
NEUTROPHILS NFR BLD AUTO: 4.97 10*3/MM3 (ref 1.7–7)
NEUTROPHILS NFR BLD AUTO: 59.1 % (ref 42.7–76)
NRBC BLD AUTO-RTO: 0 /100 WBC (ref 0–0.2)
PLATELET # BLD AUTO: 274 10*3/MM3 (ref 140–450)
PMV BLD AUTO: 9.3 FL (ref 6–12)
POTASSIUM SERPL-SCNC: 4.1 MMOL/L (ref 3.5–5.2)
PROT SERPL-MCNC: 6.7 G/DL (ref 6–8.5)
PROTHROMBIN TIME: 13.1 SECONDS (ref 11.7–14.2)
RBC # BLD AUTO: 3.95 10*6/MM3 (ref 3.77–5.28)
RSV RNA RESP QL NAA+PROBE: NOT DETECTED
SARS-COV-2 RNA RESP QL NAA+PROBE: NOT DETECTED
SODIUM SERPL-SCNC: 139 MMOL/L (ref 136–145)
TRIGL SERPL-MCNC: 208 MG/DL (ref 0–150)
TROPONIN T DELTA: 1 NG/L
TROPONIN T SERPL HS-MCNC: 9 NG/L
VLDLC SERPL-MCNC: 35 MG/DL (ref 5–40)
WBC NRBC COR # BLD AUTO: 8.41 10*3/MM3 (ref 3.4–10.8)
WHOLE BLOOD HOLD COAG: NORMAL
WHOLE BLOOD HOLD SPECIMEN: NORMAL

## 2024-11-25 PROCEDURE — G0378 HOSPITAL OBSERVATION PER HR: HCPCS

## 2024-11-25 PROCEDURE — 85379 FIBRIN DEGRADATION QUANT: CPT | Performed by: EMERGENCY MEDICINE

## 2024-11-25 PROCEDURE — 36415 COLL VENOUS BLD VENIPUNCTURE: CPT

## 2024-11-25 PROCEDURE — 93005 ELECTROCARDIOGRAM TRACING: CPT

## 2024-11-25 PROCEDURE — 71045 X-RAY EXAM CHEST 1 VIEW: CPT

## 2024-11-25 PROCEDURE — 85610 PROTHROMBIN TIME: CPT | Performed by: EMERGENCY MEDICINE

## 2024-11-25 PROCEDURE — 85730 THROMBOPLASTIN TIME PARTIAL: CPT | Performed by: EMERGENCY MEDICINE

## 2024-11-25 PROCEDURE — 83036 HEMOGLOBIN GLYCOSYLATED A1C: CPT | Performed by: INTERNAL MEDICINE

## 2024-11-25 PROCEDURE — 87637 SARSCOV2&INF A&B&RSV AMP PRB: CPT | Performed by: EMERGENCY MEDICINE

## 2024-11-25 PROCEDURE — 80053 COMPREHEN METABOLIC PANEL: CPT | Performed by: EMERGENCY MEDICINE

## 2024-11-25 PROCEDURE — 80061 LIPID PANEL: CPT | Performed by: INTERNAL MEDICINE

## 2024-11-25 PROCEDURE — 84484 ASSAY OF TROPONIN QUANT: CPT | Performed by: EMERGENCY MEDICINE

## 2024-11-25 PROCEDURE — 93005 ELECTROCARDIOGRAM TRACING: CPT | Performed by: EMERGENCY MEDICINE

## 2024-11-25 PROCEDURE — 85025 COMPLETE CBC W/AUTO DIFF WBC: CPT | Performed by: EMERGENCY MEDICINE

## 2024-11-25 PROCEDURE — 99285 EMERGENCY DEPT VISIT HI MDM: CPT

## 2024-11-25 RX ORDER — PANTOPRAZOLE SODIUM 40 MG/1
40 TABLET, DELAYED RELEASE ORAL
Status: DISCONTINUED | OUTPATIENT
Start: 2024-11-26 | End: 2024-11-26 | Stop reason: HOSPADM

## 2024-11-25 RX ORDER — ONDANSETRON 2 MG/ML
4 INJECTION INTRAMUSCULAR; INTRAVENOUS EVERY 6 HOURS PRN
Status: DISCONTINUED | OUTPATIENT
Start: 2024-11-25 | End: 2024-11-26 | Stop reason: HOSPADM

## 2024-11-25 RX ORDER — SODIUM CHLORIDE 0.9 % (FLUSH) 0.9 %
10 SYRINGE (ML) INJECTION AS NEEDED
Status: DISCONTINUED | OUTPATIENT
Start: 2024-11-25 | End: 2024-11-26 | Stop reason: HOSPADM

## 2024-11-25 RX ORDER — NITROGLYCERIN 0.4 MG/1
0.4 TABLET SUBLINGUAL
Status: DISCONTINUED | OUTPATIENT
Start: 2024-11-25 | End: 2024-11-26 | Stop reason: HOSPADM

## 2024-11-25 RX ORDER — POLYETHYLENE GLYCOL 3350 17 G/17G
17 POWDER, FOR SOLUTION ORAL DAILY PRN
Status: DISCONTINUED | OUTPATIENT
Start: 2024-11-25 | End: 2024-11-26 | Stop reason: HOSPADM

## 2024-11-25 RX ORDER — AMOXICILLIN 250 MG
2 CAPSULE ORAL 2 TIMES DAILY PRN
Status: DISCONTINUED | OUTPATIENT
Start: 2024-11-25 | End: 2024-11-26 | Stop reason: HOSPADM

## 2024-11-25 RX ORDER — ACETAMINOPHEN 160 MG/5ML
650 SOLUTION ORAL EVERY 4 HOURS PRN
Status: DISCONTINUED | OUTPATIENT
Start: 2024-11-25 | End: 2024-11-26 | Stop reason: HOSPADM

## 2024-11-25 RX ORDER — ACETAMINOPHEN 325 MG/1
650 TABLET ORAL EVERY 4 HOURS PRN
Status: DISCONTINUED | OUTPATIENT
Start: 2024-11-25 | End: 2024-11-26 | Stop reason: HOSPADM

## 2024-11-25 RX ORDER — BISACODYL 5 MG/1
5 TABLET, DELAYED RELEASE ORAL DAILY PRN
Status: DISCONTINUED | OUTPATIENT
Start: 2024-11-25 | End: 2024-11-26 | Stop reason: HOSPADM

## 2024-11-25 RX ORDER — SODIUM CHLORIDE 0.9 % (FLUSH) 0.9 %
10 SYRINGE (ML) INJECTION EVERY 12 HOURS SCHEDULED
Status: DISCONTINUED | OUTPATIENT
Start: 2024-11-25 | End: 2024-11-26 | Stop reason: HOSPADM

## 2024-11-25 RX ORDER — ISOSORBIDE MONONITRATE 30 MG/1
30 TABLET, EXTENDED RELEASE ORAL
Status: DISCONTINUED | OUTPATIENT
Start: 2024-11-25 | End: 2024-11-26 | Stop reason: HOSPADM

## 2024-11-25 RX ORDER — ONDANSETRON 4 MG/1
4 TABLET, ORALLY DISINTEGRATING ORAL EVERY 6 HOURS PRN
Status: DISCONTINUED | OUTPATIENT
Start: 2024-11-25 | End: 2024-11-26 | Stop reason: HOSPADM

## 2024-11-25 RX ORDER — AMLODIPINE BESYLATE 5 MG/1
5 TABLET ORAL
Status: DISCONTINUED | OUTPATIENT
Start: 2024-11-25 | End: 2024-11-26 | Stop reason: HOSPADM

## 2024-11-25 RX ORDER — ASPIRIN 81 MG/1
324 TABLET, CHEWABLE ORAL ONCE
Status: COMPLETED | OUTPATIENT
Start: 2024-11-25 | End: 2024-11-25

## 2024-11-25 RX ORDER — SODIUM CHLORIDE 9 MG/ML
40 INJECTION, SOLUTION INTRAVENOUS AS NEEDED
Status: DISCONTINUED | OUTPATIENT
Start: 2024-11-25 | End: 2024-11-26 | Stop reason: HOSPADM

## 2024-11-25 RX ORDER — LOSARTAN POTASSIUM 50 MG/1
50 TABLET ORAL
Status: DISCONTINUED | OUTPATIENT
Start: 2024-11-25 | End: 2024-11-26

## 2024-11-25 RX ORDER — ACETAMINOPHEN 650 MG/1
650 SUPPOSITORY RECTAL EVERY 4 HOURS PRN
Status: DISCONTINUED | OUTPATIENT
Start: 2024-11-25 | End: 2024-11-26 | Stop reason: HOSPADM

## 2024-11-25 RX ORDER — BISACODYL 10 MG
10 SUPPOSITORY, RECTAL RECTAL DAILY PRN
Status: DISCONTINUED | OUTPATIENT
Start: 2024-11-25 | End: 2024-11-26 | Stop reason: HOSPADM

## 2024-11-25 RX ADMIN — Medication 10 ML: at 20:51

## 2024-11-25 RX ADMIN — ISOSORBIDE MONONITRATE 30 MG: 30 TABLET, EXTENDED RELEASE ORAL at 17:53

## 2024-11-25 RX ADMIN — AMLODIPINE BESYLATE 5 MG: 5 TABLET ORAL at 17:53

## 2024-11-25 RX ADMIN — LOSARTAN POTASSIUM 50 MG: 50 TABLET, FILM COATED ORAL at 17:53

## 2024-11-25 RX ADMIN — ASPIRIN 81 MG CHEWABLE TABLET 324 MG: 81 TABLET CHEWABLE at 13:25

## 2024-11-25 NOTE — CONSULTS
Referring Provider: Brijesh Quan DO    Reason for Consultation: Chest pain      Patient Care Team:  Ruben Mejias MD as PCP - General (Family Medicine)  Tom Hernandez MD as Consulting Physician (General Surgery)      SUBJECTIVE     Chief Complaint: Chest pain    History of present illness:  Scarlet Gong is a 69 y.o. female former patient of Dr. Camacho (patient wants to switch her cardiac care to me) who presents to the hospital with complaints of recurrent episodes of chest pain/pressure.  She has hypertension, hyperlipidemia, sleep apnea, obesity.  These episodes of chest pressure occur with exertion and resolves with rest.   Patient underwent coronary angiography in 2020 and 2023 for similar symptoms and was noted to have nonobstructive coronary disease.  She is currently resting comfortably in bed with elevated blood pressure, normal heart rate and normal oxygen saturation    Review of systems:    Constitutional: No weakness, fatigue, fever, rigors, chills   Eyes: No vision changes, eye pain   ENT/oropharynx: No difficulty swallowing, sore throat, epistaxis, changes in hearing   Cardiovascular: + chest pain, chest tightness, palpitations, paroxysmal nocturnal dyspnea, orthopnea, diaphoresis, dizziness / syncopal episode   Respiratory: No shortness of breath, dyspnea on exertion, cough, wheezing, hemoptysis   Gastrointestinal: No abdominal pain, nausea, vomiting, diarrhea, bloody stools   Genitourinary: No hematuria, dysuria   Neurological: No headache, tremors, numbness, one-sided weakness    Musculoskeletal: No cramps, myalgias, joint pain, joint swelling   Integument: No rash, edema        Personal History:      Past Medical History:   Diagnosis Date    Anxiety     Asthma 04/27/2016    Hyperlipidemia 03/02/2020    Hypertension 03/02/2020    Obesity (BMI 30-39.9) 03/02/2020    Osteopenia 03/02/2020    Sleep apnea     SOBOE (shortness of breath on exertion)        Past Surgical History:    Procedure Laterality Date    CARDIAC CATHETERIZATION N/A 03/04/2020    Procedure: Left Heart Cath and coronary angiogram;  Surgeon: Hien Camacho MD;  Location: [facility] CATH INVASIVE LOCATION;  Service: Cardiovascular;  Laterality: N/A;    CARDIAC CATHETERIZATION N/A 03/04/2020    Procedure: Left ventriculography;  Surgeon: Hien Camacho MD;  Location: [facility] CATH INVASIVE LOCATION;  Service: Cardiovascular;  Laterality: N/A;    CARDIAC CATHETERIZATION N/A 01/30/2023    Procedure: Left Heart Cath and coronary angiogram;  Surgeon: Hien Camacho MD;  Location: [facility] CATH INVASIVE LOCATION;  Service: Cardiovascular;  Laterality: N/A;    CARDIAC CATHETERIZATION N/A 01/30/2023    Procedure: Right Heart Cath;  Surgeon: Hien Camacho MD;  Location: [facility] CATH INVASIVE LOCATION;  Service: Cardiovascular;  Laterality: N/A;    CHOLECYSTECTOMY WITH INTRAOPERATIVE CHOLANGIOGRAM N/A 2/6/2024    Procedure: CHOLECYSTECTOMY LAPAROSCOPIC INTRAOPERATIVE CHOLANGIOGRAM liver biopsy;  Surgeon: Tom Hernandez MD;  Location: [facility] MAIN OR;  Service: General;  Laterality: N/A;    COLONOSCOPY N/A 8/23/2024    Procedure: COLONOSCOPY with POLYPECTOMY X2 (COLD SNARE);  Surgeon: Bismark Cartagena MD;  Location: [facility] ENDOSCOPY;  Service: Gastroenterology;  Laterality: N/A;  POST; COLON POLYPS    ENDOSCOPY N/A 8/23/2024    Procedure: ESOPHAGOGASTRODUODENOSCOPY with BIOPSY;  Surgeon: Bismark Cartagena MD;  Location: [facility] ENDOSCOPY;  Service: Gastroenterology;  Laterality: N/A;    HYSTERECTOMY         Family History   Problem Relation Age of Onset    Hypertension Mother     Heart disease Sister     Heart disease Brother     Colon cancer Maternal Grandmother        Social History     Tobacco Use    Smoking status: Former     Current packs/day: 1.50     Average packs/day: 1.5 packs/day for 32.0 years (48.0 ttl pk-yrs)     Types: Cigarettes     Passive exposure: Past    Smokeless tobacco: Never   Vaping Use    Vaping  status: Never Used   Substance Use Topics    Alcohol use: Never    Drug use: Never        Home meds:  Prior to Admission medications    Medication Sig Start Date End Date Taking? Authorizing Provider   albuterol sulfate  (90 Base) MCG/ACT inhaler Inhale 2 puffs Every 6 (Six) Hours As Needed. 6/23/22   Lorraine Goyal MD   aspirin 81 MG EC tablet Take 1 tablet by mouth Daily.    Lorraine Goyal MD   benzonatate (Tessalon Perles) 100 MG capsule Take 1 capsule by mouth 3 (Three) Times a Day As Needed for Cough. 11/17/23   Antoinette Bernstein APRN   calcium carbonate (OS-JEANA) 600 MG tablet Take 1 tablet by mouth Daily.    Lorraine Goyal MD   Cholecalciferol 50 MCG (2000 UT) capsule Take 2,000 Int'l Units by mouth Daily. 12/5/20   Lorraine Goyal MD   ferrous sulfate 325 (65 FE) MG tablet Take 1 tablet by mouth Daily With Breakfast.    Lorraine Goyal MD   gabapentin (NEURONTIN) 100 MG capsule Take 1 capsule by mouth Every Night. 11/30/23   Antoinette Bernstein APRN   hydrALAZINE (APRESOLINE) 50 MG tablet Take 1.5 tablets by mouth 2 (Two) Times a Day. 7/6/22   Lorraine Goyal MD   hydroCHLOROthiazide (HYDRODIURIL) 50 MG tablet Take 1 tablet by mouth Daily. 7/6/22   Lorraine Goyal MD   HYDROcodone-acetaminophen (NORCO) 7.5-325 MG per tablet Take 1 tablet by mouth Every 6 (Six) Hours As Needed for Moderate Pain. 2/7/24   Mike Tom DO   hydrOXYzine (ATARAX) 25 MG tablet Take 1 tablet by mouth At Night As Needed for Itching.    Lorraine Goyal MD   Potassium 99 MG tablet Take 1 tablet by mouth 2 (Two) Times a Day.    Lorraine Goyal MD   Probiotic Product (Probiotic Blend) capsule Take 1 capsule by mouth Daily. 11/17/23   Antoinette Bernstein APRN   rOPINIRole (REQUIP) 0.5 MG tablet Take 2 tablets by mouth Every Night. Take 1 hour before bedtime.    Lorraine Goyal MD   triamcinolone (KENALOG) 0.1 % cream Apply 1 Application topically to  "the appropriate area as directed 2 (Two) Times a Day.    Provider, MD Lorraine   zinc gluconate 50 MG tablet Take 1 tablet by mouth Daily. 12/5/20   Provider, MD Lorraine       Allergies:     Prednisone, Atorvastatin, Lisinopril, and Azithromycin    Scheduled Meds:sodium chloride, 10 mL, Intravenous, Q12H      Continuous Infusions:   PRN Meds:  acetaminophen **OR** acetaminophen **OR** acetaminophen    senna-docusate sodium **AND** polyethylene glycol **AND** bisacodyl **AND** bisacodyl    nitroglycerin    ondansetron ODT **OR** ondansetron    sodium chloride    sodium chloride    sodium chloride      OBJECTIVE    Vital Signs  Vitals:    11/25/24 1402 11/25/24 1517 11/25/24 1532 11/25/24 1617   BP: 155/64 154/64 163/59 163/77   BP Location:       Patient Position:       Pulse: 67 62 68 63   Resp:       Temp:       TempSrc:       SpO2: 98% 98% 97% 98%   Weight:       Height:           Flowsheet Rows      Flowsheet Row First Filed Value   Admission Height 157.5 cm (62\") Documented at 11/25/2024 1257   Admission Weight 90.7 kg (200 lb) Documented at 11/25/2024 1257            No intake or output data in the 24 hours ending 11/25/24 1636     Telemetry: Sinus rhythm    Physical Exam:  The patient is alert, oriented and in no distress.  Obese  Vital signs as noted above.  Head and neck revealed no carotid bruits or jugular venous distention.  No thyromegaly or lymphadenopathy is present  Lungs clear.  No wheezing.  Breath sounds are normal bilaterally.  Heart: Normal first and second heart sounds. No murmur.  No precordial rub is present.  No gallop is present.  Abdomen: Soft and nontender.  No organomegaly is present.  Extremities with good peripheral pulses without any pedal edema.  Skin: Warm and dry.  Musculoskeletal system is grossly normal.  CNS grossly normal.       Results Review:  I have personally reviewed the results from the time of this admission to 11/25/2024 16:36 EST and agree with these " "findings:  []  Laboratory  []  Microbiology  []  Radiology  []  EKG/Telemetry   []  Cardiology/Vascular   []  Pathology  []  Old records  []  Other:    Most notable findings include:     Lab Results (last 24 hours)       Procedure Component Value Units Date/Time    High Sensitivity Troponin T 2Hr [[id number]] Collected: 11/25/24 1541    Specimen: Blood Updated: 11/25/24 1543    COVID-19, FLU A/B, RSV PCR 1 HR TAT - Swab, Nasopharynx [[id number]]  (Normal) Collected: 11/25/24 1411    Specimen: Swab from Nasopharynx Updated: 11/25/24 1453     COVID19 Not Detected     Influenza A PCR Not Detected     Influenza B PCR Not Detected     RSV, PCR Not Detected    Narrative:      Fact sheet for providers: https://www.fda.gov/media/564439/download    Fact sheet for patients: https://www.fda.gov/media/774113/download    Test performed by PCR.    D-dimer, Quantitative [[id number]]  (Normal) Collected: 11/25/24 1332    Specimen: Blood Updated: 11/25/24 1428     D-Dimer, Quantitative 0.64 MCGFEU/mL     Narrative:      According to the assay 's published package insert, a normal (<0.50 MCGFEU/mL) D-dimer result in conjunction with a non-high clinical probability assessment, excludes deep vein thrombosis (DVT) and pulmonary embolism (PE) with high sensitivity.    D-dimer values increase with age and this can make VTE exclusion of an older population difficult. To address this, the [employer], based on best available evidence and recent guidelines, recommends that clinicians use age-adjusted D-dimer thresholds in patients greater than 50 years of age with: a) a low probability of PE who do not meet all Pulmonary Embolism Rule Out Criteria, or b) in those with intermediate probability of PE.   The formula for an age-adjusted D-dimer cut-off is \"age/100\".  For example, a 60 year old patient would have an age-adjusted cut-off of 0.60 MCGFEU/mL and an 80 year old 0.80 MCGFEU/mL.    Comprehensive Metabolic " Panel [[id number]]  (Abnormal) Collected: 11/25/24 1332    Specimen: Blood Updated: 11/25/24 1420     Glucose 124 mg/dL      BUN 16 mg/dL      Creatinine 0.77 mg/dL      Sodium 139 mmol/L      Potassium 4.1 mmol/L      Comment: Slight hemolysis detected by analyzer. Result may be falsely elevated.        Chloride 103 mmol/L      CO2 25.1 mmol/L      Calcium 9.6 mg/dL      Total Protein 6.7 g/dL      Albumin 4.1 g/dL      ALT (SGPT) 19 U/L      AST (SGOT) 28 U/L      Alkaline Phosphatase 83 U/L      Total Bilirubin 0.2 mg/dL      Globulin 2.6 gm/dL      A/G Ratio 1.6 g/dL      BUN/Creatinine Ratio 20.8     Anion Gap 10.9 mmol/L      eGFR 84.1 mL/min/1.73     Narrative:      GFR Normal >60  Chronic Kidney Disease <60  Kidney Failure <15      High Sensitivity Troponin T [[id number]]  (Normal) Collected: 11/25/24 1332    Specimen: Blood Updated: 11/25/24 1419     HS Troponin T 9 ng/L     Narrative:      High Sensitive Troponin T Reference Range:  <14.0 ng/L- Negative Female for AMI  <22.0 ng/L- Negative Male for AMI  >=14 - Abnormal Female indicating possible myocardial injury.  >=22 - Abnormal Male indicating possible myocardial injury.   Clinicians would have to utilize clinical acumen, EKG, Troponin, and serial changes to determine if it is an Acute Myocardial Infarction or myocardial injury due to an underlying chronic condition.         Protime-INR [[id number]]  (Normal) Collected: 11/25/24 1332    Specimen: Blood Updated: 11/25/24 1403     Protime 13.1 Seconds      INR 0.99    aPTT [[id number]]  (Normal) Collected: 11/25/24 1332    Specimen: Blood Updated: 11/25/24 1403     PTT 33.5 seconds     CBC & Differential [[id number]]  (Abnormal) Collected: 11/25/24 1332    Specimen: Blood Updated: 11/25/24 1348    Narrative:      The following orders were created for panel order CBC & Differential.  Procedure                               Abnormality         Status                     ---------                                -----------         ------                     CBC Auto Differential[[id number]]        Abnormal            Final result                 Please view results for these tests on the individual orders.    CBC Auto Differential [[id number]]  (Abnormal) Collected: 11/25/24 1332    Specimen: Blood Updated: 11/25/24 1348     WBC 8.41 10*3/mm3      RBC 3.95 10*6/mm3      Hemoglobin 11.5 g/dL      Hematocrit 35.8 %      MCV 90.6 fL      MCH 29.1 pg      MCHC 32.1 g/dL      RDW 14.4 %      RDW-SD 47.3 fl      MPV 9.3 fL      Platelets 274 10*3/mm3      Neutrophil % 59.1 %      Lymphocyte % 29.8 %      Monocyte % 7.1 %      Eosinophil % 3.2 %      Basophil % 0.4 %      Immature Grans % 0.4 %      Neutrophils, Absolute 4.97 10*3/mm3      Lymphocytes, Absolute 2.51 10*3/mm3      Monocytes, Absolute 0.60 10*3/mm3      Eosinophils, Absolute 0.27 10*3/mm3      Basophils, Absolute 0.03 10*3/mm3      Immature Grans, Absolute 0.03 10*3/mm3      nRBC 0.0 /100 WBC     Katy Draw [[id number]] Collected: 11/25/24 1332    Specimen: Blood Updated: 11/25/24 1345    Narrative:      The following orders were created for panel order Katy Draw.  Procedure                               Abnormality         Status                     ---------                               -----------         ------                     Green Top (Gel)[[id number]]                                  Final result               Lavender Top[[id number]]                                     Final result               Gold Top - SST[[id number]]                                   Final result               Light Blue Top[[id number]]                                   Final result                 Please view results for these tests on the individual orders.    Green Top (Gel) [[id number]] Collected: 11/25/24 1332    Specimen: Blood Updated: 11/25/24 1345     Extra Tube Hold for add-ons.     Comment: Auto resulted.       Lavender Top [[id number]] Collected: 11/25/24 1332    Specimen:  Blood Updated: 11/25/24 1345     Extra Tube hold for add-on     Comment: Auto resulted       Gold Top - SST [[id number]] Collected: 11/25/24 1332    Specimen: Blood Updated: 11/25/24 1345     Extra Tube Hold for add-ons.     Comment: Auto resulted.       Light Blue Top [[id number]] Collected: 11/25/24 1332    Specimen: Blood Updated: 11/25/24 1345     Extra Tube Hold for add-ons.     Comment: Auto resulted               Imaging Results (Last 24 Hours)       Procedure Component Value Units Date/Time    XR Chest 1 View [[id number]] Collected: 11/25/24 1319     Updated: 11/25/24 1323    Narrative:      XR CHEST 1 VW    Date of Exam: 11/25/2024 1:10 PM EST    Indication: Chest Pain Triage Protocol    Comparison: Chest x-ray 1/28/2023    Findings:  The heart is stable in size. There are low lung volumes. Chronic interstitial changes are noted. No consolidation. No pneumothorax or pleural effusion.      Impression:      Impression:  Stable chest examination without acute cardiopulmonary process.      Electronically Signed: Keisha Quezada MD    11/25/2024 1:20 PM EST    Workstation ID: [id number]            LAB RESULTS (LAST 7 DAYS)    CBC  Results from last 7 days   Lab Units 11/25/24  1332   WBC 10*3/mm3 8.41   RBC 10*6/mm3 3.95   HEMOGLOBIN g/dL 11.5*   HEMATOCRIT % 35.8   MCV fL 90.6   PLATELETS 10*3/mm3 274       BMP  Results from last 7 days   Lab Units 11/25/24  1332   SODIUM mmol/L 139   POTASSIUM mmol/L 4.1   CHLORIDE mmol/L 103   CO2 mmol/L 25.1   BUN mg/dL 16   CREATININE mg/dL 0.77   GLUCOSE mg/dL 124*       CMP   Results from last 7 days   Lab Units 11/25/24  1332   SODIUM mmol/L 139   POTASSIUM mmol/L 4.1   CHLORIDE mmol/L 103   CO2 mmol/L 25.1   BUN mg/dL 16   CREATININE mg/dL 0.77   GLUCOSE mg/dL 124*   ALBUMIN g/dL 4.1   BILIRUBIN mg/dL 0.2   ALK PHOS U/L 83   AST (SGOT) U/L 28   ALT (SGPT) U/L 19       BNP        TROPONIN  Results from last 7 days   Lab Units 11/25/24  1332   HSTROP T ng/L 9        CoAg  Results from last 7 days   Lab Units 11/25/24  1332   INR  0.99   APTT seconds 33.5       Creatinine Clearance  Estimated Creatinine Clearance: 73.2 mL/min (by C-G formula based on SCr of 0.77 mg/dL).    ABG          Radiology  XR Chest 1 View    Result Date: 11/25/2024  Impression: Stable chest examination without acute cardiopulmonary process. Electronically Signed: Keisha Quezada MD  11/25/2024 1:20 PM EST  Workstation ID: [id number]       EKG  I personally viewed and interpreted the patient's EKG/Telemetry data:  ECG 12 Lead ED Triage Standing Order; Chest Pain   Preliminary Result   HEART RATE=70  bpm   RR Ttpmecvh=665  ms   CO Jhqgkzze=312  ms   P Horizontal Axis=-2  deg   P Front Axis=28  deg   QRSD Interval=76  ms   QT Bmqkhkoi=563  ms   JJsX=209  ms   QRS Axis=13  deg   T Wave Axis=25  deg   - ABNORMAL ECG -   Sinus rhythm   Probable anteroseptal infarct, old   When compared with ECG of 07-Nov-2023 15:31:14,   New or worsened ischemia or infarction   Date and Time of Study:2024-11-25 13:12:24            Echocardiogram:    Results for orders placed during the hospital encounter of 11/07/23    Adult Transthoracic Echo Complete W/ Cont if Necessary Per Protocol    Interpretation Summary    Left ventricular systolic function is normal. Calculated left ventricular EF = 59% Left ventricular ejection fraction appears to be 56 - 60%.    Left ventricular diastolic function is consistent with (grade I) impaired relaxation.    Estimated right ventricular systolic pressure from tricuspid regurgitation is normal (<35 mmHg).    No significant valvular abnormalities.        Stress Test:  Results for orders placed during the hospital encounter of 11/07/23    Stress Test With Myocardial Perfusion One Day    Interpretation Summary  Indications  Chest pain    This study was performed under the direct supervision of Chayito HUMMEL.    Resting ECG  Sinus rhythm    The patient was injected with Lexiscan intravenously  while constantly monitoring electrocardiogram and vital signs.  Patient did not have any chest discomfort ST abnormalities or ectopy with injection of Lexiscan.    Cardiolite was used as an imaging agent.    Cardiolite images showed uniform distribution of radionuclide without any evidence for myocardial ischemia.  Other images showed reversibility.  Probably artifactual.    Gated SPECT images revealed normal left ventricle size and contractility with ejection fraction of 85%.    Impression  ========  Lexiscan Cardiolite test showed uniform distribution of radionuclide although other images showed reversibility.  Probably artifactual.  Gated SPECT images revealed normal left ventricular size and contractility with ejection fraction of 85%.        Cardiac Catheterization:  Results for orders placed during the hospital encounter of 01/28/23    Cardiac Catheterization/Vascular Study    Conclusion  CARDIAC CATHETERIZATION REPORT    DATE OF PROCEDURE:  1/30/2023    INDICATION FOR PROCEDURE:  Shortness of breath  Chest discomfort  Abnormal stress nuclear test    PROCEDURE PERFORMED:  Right heart catheterization  Left heart catheterization, coronary angiography, left ventriculography  @@  Moderate conscious sedation was utilized with intravenous Versed and fentanyl administered by registered nurse with continuous ECG, pulse oximetry and hemodynamic monitoring supervised by myself throughout the entire procedure.  Conscious sedation time   30 minutes    I was present with the patient for the duration of moderate sedation and supervised staff who had no other duties and monitored the patient for the entire procedure.    @@  PROCEDURE COMMENTS:    Under usual sterile precautions and 1% Xylocaine filtration right femoral vein and femoral artery were percutaneously punctured and a 7 and 5 [place] vascular sheaths were respectively inserted.  [place]-Debi catheter was used to measure right-sided pressures pulmonary capillary wedge  pressure and cardiac output using thermodilution technique.  [place]-Debi catheter was removed and subsequently left and right coronary arteriography was performed in varying degrees of obliquity followed by left ventricular angiogram.  Hemostasis was obtained and patient was returned to the room with intact pulses.  No complications were noted.    FINDINGS:    1. HEMODYNAMICS:  Left ventricle end-diastolic pressure is normal.  No gradient was noted across aortic valve.  Mean right atrial pressure 9 pulmonary artery 35/12 mean pulmonary artery 27 pulmonary capital wedge pressure is 20.    2. LEFT VENTRICULOGRAPHY:  Left ventricular size and contractility is normal with ejection fraction of 60%.  No mitral regurgitation is present.    3. CORONARY ANGIOGRAPHY:  Left main coronary artery is normal.  Left anterior descending artery is normal.  Diagonal branch has proximal 50% disease.  Circumflex coronary artery is normal.  Right coronary artery is a large and dominant vessel and is normal.    SUMMARY:  No evidence for pulmonary hypertension is present.  Normal left ventricular size and contractility with ejection fraction of 60%.  Normal epicardial coronary arteries except for 50% proximal diagonal branch disease.    RECOMMENDATIONS:  Noncardiac work-up.        Other:      ASSESSMENT & PLAN:    Principal Problem:    Chest pain    Chest pain  ECG shows normal sinus rhythm: No ischemic changes.  High-sensitivity troponin is negative x 2  Reviewed previous stress test and cardiac catheterization  Most recent coronary angiography in 2023 with nonobstructive coronary disease.  Previous right heart cath also ruled out pulmonary hypertension  Will initiate medical therapy for microvascular angina.  Start nitroglycerin/Imdur  I do not see the need to repeat ischemic workup at this time  I will also start a trial of PPI    Hypertension  She was on hydralazine and hydrochlorothiazide  We will stop hydralazine and start her on   Imdur  I will also add amlodipine and losartan.  She reports cough with lisinopril    HFpEF  HFpEF secondary to hypertension and obesity.  Echocardiogram shows preserved LV function with grade 1 diastolic dysfunction  Continue hydrochlorothiazide    Hyperlipidemia/diabetes  Check lipid panel and A1c  She reports intolerance to atorvastatin  We discussed Repatha/Praluent depending on results of lipid panel    Obesity  BMI is 36.6.  She weighs 200 pounds.  Lifestyle modifications recommended to the patient  Screening and treatment for sleep apnea recommended            Navdeep Mosquera MD  11/25/24  16:36 EST

## 2024-11-25 NOTE — ED PROVIDER NOTES
"Subjective   History of Present Illness  Chief complaint: Patient is a 69-year-old female who presents with recurrent episodes of chest pressure.  She states that it has been worse since yesterday but she has had it several times over the last few weeks.  She has had stress test in the last couple of years.  She states she has never had any stenting but has had a heart cath remotely.  She states that with any exertion she gets this discomfort and makes her short of breath.  She is also noted a dry cough.  She does get an occasional headache.  The headache is not severe.  She has had no recent long trips.  She has not had a history of blood clot in the past.  No recent hospitalizations or surgeries.  With these recurrent episodes of exertional chest discomfort she presented here for further evaluation.    Context:    Duration:    Timing:    Severity:    Associated Symptoms:        PCP:  LMP:      Review of Systems   Respiratory:  Positive for cough.    Cardiovascular:  Positive for chest pain.   Gastrointestinal:  Negative for abdominal pain.   Genitourinary: Negative.    Musculoskeletal: Negative.    Neurological:  Positive for headaches.       Past Medical History:   Diagnosis Date    Anxiety     Asthma 04/27/2016    Hyperlipidemia 03/02/2020    Hypertension 03/02/2020    Obesity (BMI 30-39.9) 03/02/2020    Osteopenia 03/02/2020    Sleep apnea     SOBOE (shortness of breath on exertion)        Allergies   Allergen Reactions    Prednisone Rash    Atorvastatin Itching    Lisinopril Cough    Azithromycin Rash     Re-entered from allergy \"Z-Pack\"       Past Surgical History:   Procedure Laterality Date    CARDIAC CATHETERIZATION N/A 03/04/2020    Procedure: Left Heart Cath and coronary angiogram;  Surgeon: Hien Camacho MD;  Location: [facility] CATH INVASIVE LOCATION;  Service: Cardiovascular;  Laterality: N/A;    CARDIAC CATHETERIZATION N/A 03/04/2020    Procedure: Left ventriculography;  Surgeon: Hien Camacho" MD;  Location: [facility] CATH INVASIVE LOCATION;  Service: Cardiovascular;  Laterality: N/A;    CARDIAC CATHETERIZATION N/A 01/30/2023    Procedure: Left Heart Cath and coronary angiogram;  Surgeon: Hien Camacho MD;  Location: [facility] CATH INVASIVE LOCATION;  Service: Cardiovascular;  Laterality: N/A;    CARDIAC CATHETERIZATION N/A 01/30/2023    Procedure: Right Heart Cath;  Surgeon: Hien Camacho MD;  Location: [facility] CATH INVASIVE LOCATION;  Service: Cardiovascular;  Laterality: N/A;    CHOLECYSTECTOMY WITH INTRAOPERATIVE CHOLANGIOGRAM N/A 2/6/2024    Procedure: CHOLECYSTECTOMY LAPAROSCOPIC INTRAOPERATIVE CHOLANGIOGRAM liver biopsy;  Surgeon: Tom Hernandez MD;  Location: [facility] MAIN OR;  Service: General;  Laterality: N/A;    COLONOSCOPY N/A 8/23/2024    Procedure: COLONOSCOPY with POLYPECTOMY X2 (COLD SNARE);  Surgeon: Bismark Cartagean MD;  Location: [facility] ENDOSCOPY;  Service: Gastroenterology;  Laterality: N/A;  POST; COLON POLYPS    ENDOSCOPY N/A 8/23/2024    Procedure: ESOPHAGOGASTRODUODENOSCOPY with BIOPSY;  Surgeon: Bismark Cartaegna MD;  Location: [facility] ENDOSCOPY;  Service: Gastroenterology;  Laterality: N/A;    HYSTERECTOMY         Family History   Problem Relation Age of Onset    Hypertension Mother     Heart disease Sister     Heart disease Brother     Colon cancer Maternal Grandmother        Social History     Socioeconomic History    Marital status:    Tobacco Use    Smoking status: Former     Current packs/day: 1.50     Average packs/day: 1.5 packs/day for 32.0 years (48.0 ttl pk-yrs)     Types: Cigarettes     Passive exposure: Past    Smokeless tobacco: Never   Vaping Use    Vaping status: Never Used   Substance and Sexual Activity    Alcohol use: Never    Drug use: Never    Sexual activity: Defer           Objective   Physical Exam  Vitals and nursing note reviewed.   Constitutional:       General: She is not in acute distress.  HENT:      Head: Normocephalic and  atraumatic.   Cardiovascular:      Rate and Rhythm: Normal rate and regular rhythm.      Heart sounds: Normal heart sounds.   Pulmonary:      Effort: Pulmonary effort is normal.      Breath sounds: Normal breath sounds.   Abdominal:      Palpations: Abdomen is soft.   Musculoskeletal:      Right lower leg: No tenderness. No edema.      Left lower leg: No tenderness. No edema.   Skin:     General: Skin is warm.   Neurological:      General: No focal deficit present.      Mental Status: She is alert and oriented to person, place, and time.   Psychiatric:         Mood and Affect: Mood normal.         Procedures           ED Course                                           Results for orders placed or performed during the hospital encounter of 11/25/24   ECG 12 Lead ED Triage Standing Order; Chest Pain    Collection Time: 11/25/24  1:12 PM   Result Value Ref Range    QT Interval 370 ms    QTC Interval 401 ms   Comprehensive Metabolic Panel    Collection Time: 11/25/24  1:32 PM    Specimen: Blood   Result Value Ref Range    Glucose 124 (H) 65 - 99 mg/dL    BUN 16 8 - 23 mg/dL    Creatinine 0.77 0.57 - 1.00 mg/dL    Sodium 139 136 - 145 mmol/L    Potassium 4.1 3.5 - 5.2 mmol/L    Chloride 103 98 - 107 mmol/L    CO2 25.1 22.0 - 29.0 mmol/L    Calcium 9.6 8.6 - 10.5 mg/dL    Total Protein 6.7 6.0 - 8.5 g/dL    Albumin 4.1 3.5 - 5.2 g/dL    ALT (SGPT) 19 1 - 33 U/L    AST (SGOT) 28 1 - 32 U/L    Alkaline Phosphatase 83 39 - 117 U/L    Total Bilirubin 0.2 0.0 - 1.2 mg/dL    Globulin 2.6 gm/dL    A/G Ratio 1.6 g/dL    BUN/Creatinine Ratio 20.8 7.0 - 25.0    Anion Gap 10.9 5.0 - 15.0 mmol/L    eGFR 84.1 >60.0 mL/min/1.73   High Sensitivity Troponin T    Collection Time: 11/25/24  1:32 PM    Specimen: Blood   Result Value Ref Range    HS Troponin T 9 <14 ng/L   CBC Auto Differential    Collection Time: 11/25/24  1:32 PM    Specimen: Blood   Result Value Ref Range    WBC 8.41 3.40 - 10.80 10*3/mm3    RBC 3.95 3.77 - 5.28 10*6/mm3     Hemoglobin 11.5 (L) 12.0 - 15.9 g/dL    Hematocrit 35.8 34.0 - 46.6 %    MCV 90.6 79.0 - 97.0 fL    MCH 29.1 26.6 - 33.0 pg    MCHC 32.1 31.5 - 35.7 g/dL    RDW 14.4 12.3 - 15.4 %    RDW-SD 47.3 37.0 - 54.0 fl    MPV 9.3 6.0 - 12.0 fL    Platelets 274 140 - 450 10*3/mm3    Neutrophil % 59.1 42.7 - 76.0 %    Lymphocyte % 29.8 19.6 - 45.3 %    Monocyte % 7.1 5.0 - 12.0 %    Eosinophil % 3.2 0.3 - 6.2 %    Basophil % 0.4 0.0 - 1.5 %    Immature Grans % 0.4 0.0 - 0.5 %    Neutrophils, Absolute 4.97 1.70 - 7.00 10*3/mm3    Lymphocytes, Absolute 2.51 0.70 - 3.10 10*3/mm3    Monocytes, Absolute 0.60 0.10 - 0.90 10*3/mm3    Eosinophils, Absolute 0.27 0.00 - 0.40 10*3/mm3    Basophils, Absolute 0.03 0.00 - 0.20 10*3/mm3    Immature Grans, Absolute 0.03 0.00 - 0.05 10*3/mm3    nRBC 0.0 0.0 - 0.2 /100 WBC   Protime-INR    Collection Time: 11/25/24  1:32 PM    Specimen: Blood   Result Value Ref Range    Protime 13.1 11.7 - 14.2 Seconds    INR 0.99 0.90 - 1.10   aPTT    Collection Time: 11/25/24  1:32 PM    Specimen: Blood   Result Value Ref Range    PTT 33.5 22.7 - 35.4 seconds   D-dimer, Quantitative    Collection Time: 11/25/24  1:32 PM    Specimen: Blood   Result Value Ref Range    D-Dimer, Quantitative 0.64 0.00 - 0.68 MCGFEU/mL   Green Top (Gel)    Collection Time: 11/25/24  1:32 PM   Result Value Ref Range    Extra Tube Hold for add-ons.    Lavender Top    Collection Time: 11/25/24  1:32 PM   Result Value Ref Range    Extra Tube hold for add-on    Gold Top - SST    Collection Time: 11/25/24  1:32 PM   Result Value Ref Range    Extra Tube Hold for add-ons.    Light Blue Top    Collection Time: 11/25/24  1:32 PM   Result Value Ref Range    Extra Tube Hold for add-ons.    COVID-19, FLU A/B, RSV PCR 1 HR TAT - Swab, Nasopharynx    Collection Time: 11/25/24  2:11 PM    Specimen: Nasopharynx; Swab   Result Value Ref Range    COVID19 Not Detected Not Detected - Ref. Range    Influenza A PCR Not Detected Not Detected     Influenza B PCR Not Detected Not Detected    RSV, PCR Not Detected Not Detected             XR Chest 1 View    Result Date: 11/25/2024  Impression: Stable chest examination without acute cardiopulmonary process. Electronically Signed: Keisha Quezada MD  11/25/2024 1:20 PM EST  Workstation ID: [id number]       Medical Decision Making  Patient was seen evaluated for the above problem    Differential diagnosis includes but is not limited to ACS, PE, pneumonia, respiratory infection,    Patient did present with exertional chest pressure and discomfort.  She has had negative cardiac workup in the past.  She also had a dry cough and a mild headache.  Her viral tests were negative.  Chest x-ray reviewed by myself shows no pneumonia.  No pneumothorax.  No abnormal finding.  Status normal size.  She received aspirin here in the emergency department.  I did consider CT head as the patient had a headache but it was not severe.  She has no neurologic symptoms.  Her reason for coming in today was for chest pain.  She had a heart cath done years ago.  She has multiple risk factors for heart disease.  Will place in the observation unit for cardiac consultation and further management.  I discussed with Christi in the observation unit who agrees to take the patient.    Amount and/or Complexity of Data Reviewed  Labs: ordered. Decision-making details documented in ED Course.     Details: Labs reviewed by myself  Radiology: ordered and independent interpretation performed.     Details: X-ray reviewed by myself as above  ECG/medicine tests: ordered and independent interpretation performed.     Details: EKG interpreted by myself sinus rhythm poor wave progression rate of 70    Risk  OTC drugs.  Prescription drug management.  Decision regarding hospitalization.        Final diagnoses:   None   Chest pain      ED Disposition  ED Disposition       None            No follow-up provider specified.       Medication List      No changes were  made to your prescriptions during this visit.            Brijesh Quan,   11/25/24 [id number]

## 2024-11-26 VITALS
TEMPERATURE: 98.2 F | RESPIRATION RATE: 22 BRPM | HEART RATE: 62 BPM | BODY MASS INDEX: 36.8 KG/M2 | DIASTOLIC BLOOD PRESSURE: 75 MMHG | WEIGHT: 199.96 LBS | HEIGHT: 62 IN | SYSTOLIC BLOOD PRESSURE: 172 MMHG | OXYGEN SATURATION: 98 %

## 2024-11-26 LAB
ANION GAP SERPL CALCULATED.3IONS-SCNC: 9.2 MMOL/L (ref 5–15)
BASOPHILS # BLD AUTO: 0.03 10*3/MM3 (ref 0–0.2)
BASOPHILS NFR BLD AUTO: 0.3 % (ref 0–1.5)
BUN SERPL-MCNC: 16 MG/DL (ref 8–23)
BUN/CREAT SERPL: 22.9 (ref 7–25)
CALCIUM SPEC-SCNC: 9.5 MG/DL (ref 8.6–10.5)
CHLORIDE SERPL-SCNC: 104 MMOL/L (ref 98–107)
CO2 SERPL-SCNC: 25.8 MMOL/L (ref 22–29)
CREAT SERPL-MCNC: 0.7 MG/DL (ref 0.57–1)
DEPRECATED RDW RBC AUTO: 47.3 FL (ref 37–54)
EGFRCR SERPLBLD CKD-EPI 2021: 94.3 ML/MIN/1.73
EOSINOPHIL # BLD AUTO: 0.34 10*3/MM3 (ref 0–0.4)
EOSINOPHIL NFR BLD AUTO: 4 % (ref 0.3–6.2)
ERYTHROCYTE [DISTWIDTH] IN BLOOD BY AUTOMATED COUNT: 14.4 % (ref 12.3–15.4)
GLUCOSE BLDC GLUCOMTR-MCNC: 109 MG/DL (ref 70–105)
GLUCOSE SERPL-MCNC: 115 MG/DL (ref 65–99)
HCT VFR BLD AUTO: 36 % (ref 34–46.6)
HGB BLD-MCNC: 11.4 G/DL (ref 12–15.9)
IMM GRANULOCYTES # BLD AUTO: 0.03 10*3/MM3 (ref 0–0.05)
IMM GRANULOCYTES NFR BLD AUTO: 0.3 % (ref 0–0.5)
LYMPHOCYTES # BLD AUTO: 2.87 10*3/MM3 (ref 0.7–3.1)
LYMPHOCYTES NFR BLD AUTO: 33.4 % (ref 19.6–45.3)
MAGNESIUM SERPL-MCNC: 1.9 MG/DL (ref 1.6–2.4)
MCH RBC QN AUTO: 28.5 PG (ref 26.6–33)
MCHC RBC AUTO-ENTMCNC: 31.7 G/DL (ref 31.5–35.7)
MCV RBC AUTO: 90 FL (ref 79–97)
MONOCYTES # BLD AUTO: 0.61 10*3/MM3 (ref 0.1–0.9)
MONOCYTES NFR BLD AUTO: 7.1 % (ref 5–12)
NEUTROPHILS NFR BLD AUTO: 4.71 10*3/MM3 (ref 1.7–7)
NEUTROPHILS NFR BLD AUTO: 54.9 % (ref 42.7–76)
NRBC BLD AUTO-RTO: 0 /100 WBC (ref 0–0.2)
NT-PROBNP SERPL-MCNC: 300 PG/ML (ref 0–900)
PLATELET # BLD AUTO: 267 10*3/MM3 (ref 140–450)
PMV BLD AUTO: 9 FL (ref 6–12)
POTASSIUM SERPL-SCNC: 3.9 MMOL/L (ref 3.5–5.2)
QT INTERVAL: 370 MS
QTC INTERVAL: 401 MS
RBC # BLD AUTO: 4 10*6/MM3 (ref 3.77–5.28)
SODIUM SERPL-SCNC: 139 MMOL/L (ref 136–145)
T4 FREE SERPL-MCNC: 1.09 NG/DL (ref 0.93–1.7)
TSH SERPL DL<=0.05 MIU/L-ACNC: 2.54 UIU/ML (ref 0.27–4.2)
WBC NRBC COR # BLD AUTO: 8.59 10*3/MM3 (ref 3.4–10.8)

## 2024-11-26 PROCEDURE — 80048 BASIC METABOLIC PNL TOTAL CA: CPT | Performed by: NURSE PRACTITIONER

## 2024-11-26 PROCEDURE — 83735 ASSAY OF MAGNESIUM: CPT | Performed by: NURSE PRACTITIONER

## 2024-11-26 PROCEDURE — 84443 ASSAY THYROID STIM HORMONE: CPT | Performed by: NURSE PRACTITIONER

## 2024-11-26 PROCEDURE — G0378 HOSPITAL OBSERVATION PER HR: HCPCS

## 2024-11-26 PROCEDURE — 83880 ASSAY OF NATRIURETIC PEPTIDE: CPT | Performed by: NURSE PRACTITIONER

## 2024-11-26 PROCEDURE — 82948 REAGENT STRIP/BLOOD GLUCOSE: CPT

## 2024-11-26 PROCEDURE — 84439 ASSAY OF FREE THYROXINE: CPT | Performed by: NURSE PRACTITIONER

## 2024-11-26 PROCEDURE — 85025 COMPLETE CBC W/AUTO DIFF WBC: CPT | Performed by: NURSE PRACTITIONER

## 2024-11-26 RX ORDER — GABAPENTIN 100 MG/1
100 CAPSULE ORAL NIGHTLY
Status: DISCONTINUED | OUTPATIENT
Start: 2024-11-26 | End: 2024-11-26 | Stop reason: HOSPADM

## 2024-11-26 RX ORDER — AMLODIPINE BESYLATE 5 MG/1
5 TABLET ORAL
Qty: 30 TABLET | Refills: 0 | Status: SHIPPED | OUTPATIENT
Start: 2024-11-27

## 2024-11-26 RX ORDER — ISOSORBIDE MONONITRATE 30 MG/1
30 TABLET, EXTENDED RELEASE ORAL
Qty: 30 TABLET | Refills: 0 | Status: SHIPPED | OUTPATIENT
Start: 2024-11-27

## 2024-11-26 RX ORDER — HYDROCHLOROTHIAZIDE 25 MG/1
50 TABLET ORAL DAILY
Status: DISCONTINUED | OUTPATIENT
Start: 2024-11-26 | End: 2024-11-26

## 2024-11-26 RX ORDER — FERROUS SULFATE 324(65)MG
324 TABLET, DELAYED RELEASE (ENTERIC COATED) ORAL
Status: DISCONTINUED | OUTPATIENT
Start: 2024-11-26 | End: 2024-11-26 | Stop reason: HOSPADM

## 2024-11-26 RX ORDER — PANTOPRAZOLE SODIUM 40 MG/1
40 TABLET, DELAYED RELEASE ORAL
Qty: 30 TABLET | Refills: 0 | Status: SHIPPED | OUTPATIENT
Start: 2024-11-27

## 2024-11-26 RX ORDER — IBUPROFEN 600 MG/1
1 TABLET ORAL
Status: DISCONTINUED | OUTPATIENT
Start: 2024-11-26 | End: 2024-11-26 | Stop reason: HOSPADM

## 2024-11-26 RX ORDER — ASPIRIN 81 MG/1
81 TABLET ORAL DAILY
Status: DISCONTINUED | OUTPATIENT
Start: 2024-11-26 | End: 2024-11-26 | Stop reason: HOSPADM

## 2024-11-26 RX ORDER — HYDROCHLOROTHIAZIDE 25 MG/1
50 TABLET ORAL DAILY
Status: DISCONTINUED | OUTPATIENT
Start: 2024-11-26 | End: 2024-11-26 | Stop reason: HOSPADM

## 2024-11-26 RX ORDER — ROPINIROLE 1 MG/1
1 TABLET, FILM COATED ORAL NIGHTLY
Status: DISCONTINUED | OUTPATIENT
Start: 2024-11-26 | End: 2024-11-26 | Stop reason: HOSPADM

## 2024-11-26 RX ORDER — HYDROCODONE BITARTRATE AND ACETAMINOPHEN 7.5; 325 MG/1; MG/1
1 TABLET ORAL EVERY 6 HOURS PRN
Status: DISCONTINUED | OUTPATIENT
Start: 2024-11-26 | End: 2024-11-26 | Stop reason: HOSPADM

## 2024-11-26 RX ORDER — DEXTROSE MONOHYDRATE 25 G/50ML
25 INJECTION, SOLUTION INTRAVENOUS
Status: DISCONTINUED | OUTPATIENT
Start: 2024-11-26 | End: 2024-11-26 | Stop reason: HOSPADM

## 2024-11-26 RX ORDER — LOSARTAN POTASSIUM 100 MG/1
100 TABLET ORAL
Qty: 30 TABLET | Refills: 0 | Status: SHIPPED | OUTPATIENT
Start: 2024-11-27

## 2024-11-26 RX ORDER — INSULIN LISPRO 100 [IU]/ML
2-9 INJECTION, SOLUTION INTRAVENOUS; SUBCUTANEOUS
Status: DISCONTINUED | OUTPATIENT
Start: 2024-11-26 | End: 2024-11-26 | Stop reason: HOSPADM

## 2024-11-26 RX ORDER — BENZONATATE 100 MG/1
100 CAPSULE ORAL 3 TIMES DAILY PRN
Status: DISCONTINUED | OUTPATIENT
Start: 2024-11-26 | End: 2024-11-26 | Stop reason: HOSPADM

## 2024-11-26 RX ORDER — NICOTINE POLACRILEX 4 MG
15 LOZENGE BUCCAL
Status: DISCONTINUED | OUTPATIENT
Start: 2024-11-26 | End: 2024-11-26 | Stop reason: HOSPADM

## 2024-11-26 RX ORDER — ALBUTEROL SULFATE 0.83 MG/ML
2.5 SOLUTION RESPIRATORY (INHALATION) EVERY 6 HOURS PRN
Status: DISCONTINUED | OUTPATIENT
Start: 2024-11-26 | End: 2024-11-26 | Stop reason: HOSPADM

## 2024-11-26 RX ORDER — HYDRALAZINE HYDROCHLORIDE 25 MG/1
75 TABLET, FILM COATED ORAL 2 TIMES DAILY
Status: DISCONTINUED | OUTPATIENT
Start: 2024-11-26 | End: 2024-11-26

## 2024-11-26 RX ORDER — LOSARTAN POTASSIUM 50 MG/1
100 TABLET ORAL
Status: DISCONTINUED | OUTPATIENT
Start: 2024-11-26 | End: 2024-11-26 | Stop reason: HOSPADM

## 2024-11-26 RX ADMIN — LOSARTAN POTASSIUM 100 MG: 50 TABLET, FILM COATED ORAL at 09:44

## 2024-11-26 RX ADMIN — ASPIRIN 81 MG: 81 TABLET, COATED ORAL at 09:44

## 2024-11-26 RX ADMIN — HYDROCHLOROTHIAZIDE 50 MG: 25 TABLET ORAL at 09:44

## 2024-11-26 RX ADMIN — ISOSORBIDE MONONITRATE 30 MG: 30 TABLET, EXTENDED RELEASE ORAL at 09:44

## 2024-11-26 RX ADMIN — PANTOPRAZOLE SODIUM 40 MG: 40 TABLET, DELAYED RELEASE ORAL at 06:17

## 2024-11-26 RX ADMIN — AMLODIPINE BESYLATE 5 MG: 5 TABLET ORAL at 09:44

## 2024-11-26 RX ADMIN — FERROUS SULFATE TAB EC 324 MG (65 MG FE EQUIVALENT) 324 MG: 324 (65 FE) TABLET DELAYED RESPONSE at 09:44

## 2024-11-26 NOTE — DISCHARGE SUMMARY
[facility]    Ruben Mejias MD    CHIEF COMPLAINT:     Chest Pain     HISTORY OF PRESENT ILLNESS:    HPI    Patient is a 69-year-old female who presents with recurrent episodes of chest pressure. She states that it has been worse since yesterday but she has had it several times over the last few weeks. She has had stress test in the last couple of years. She states she has never had any stenting but has had a heart cath remotely. She states that with any exertion she gets this discomfort and makes her short of breath. She is also noted a dry cough. She does get an occasional headache. The headache is not severe. She has had no recent long trips. She has not had a history of blood clot in the past. No recent hospitalizations or surgeries. With these recurrent episodes of exertional chest discomfort she presented here for further evaluation.     Past Medical History:   Diagnosis Date    Anxiety     Asthma 04/27/2016    Hyperlipidemia 03/02/2020    Hypertension 03/02/2020    Obesity (BMI 30-39.9) 03/02/2020    Osteopenia 03/02/2020    Sleep apnea     SOBOE (shortness of breath on exertion)      Past Surgical History:   Procedure Laterality Date    CARDIAC CATHETERIZATION N/A 03/04/2020    Procedure: Left Heart Cath and coronary angiogram;  Surgeon: Hien Camacho MD;  Location: [facility] CATH INVASIVE LOCATION;  Service: Cardiovascular;  Laterality: N/A;    CARDIAC CATHETERIZATION N/A 03/04/2020    Procedure: Left ventriculography;  Surgeon: Hien Camacho MD;  Location: [facility] CATH INVASIVE LOCATION;  Service: Cardiovascular;  Laterality: N/A;    CARDIAC CATHETERIZATION N/A 01/30/2023    Procedure: Left Heart Cath and coronary angiogram;  Surgeon: Hien Camacho MD;  Location: [facility] CATH INVASIVE LOCATION;  Service: Cardiovascular;  Laterality: N/A;    CARDIAC CATHETERIZATION N/A 01/30/2023    Procedure: Right Heart Cath;  Surgeon: Hien Camacho MD;  Location: [facility] INVASIVE  LOCATION;  Service: Cardiovascular;  Laterality: N/A;    CHOLECYSTECTOMY WITH INTRAOPERATIVE CHOLANGIOGRAM N/A 2/6/2024    Procedure: CHOLECYSTECTOMY LAPAROSCOPIC INTRAOPERATIVE CHOLANGIOGRAM liver biopsy;  Surgeon: Tom Hernandez MD;  Location: [facility] MAIN OR;  Service: General;  Laterality: N/A;    COLONOSCOPY N/A 8/23/2024    Procedure: COLONOSCOPY with POLYPECTOMY X2 (COLD SNARE);  Surgeon: Bismark Cartagena MD;  Location: [facility] ENDOSCOPY;  Service: Gastroenterology;  Laterality: N/A;  POST; COLON POLYPS    ENDOSCOPY N/A 8/23/2024    Procedure: ESOPHAGOGASTRODUODENOSCOPY with BIOPSY;  Surgeon: Bismark Cartagena MD;  Location: [facility] ENDOSCOPY;  Service: Gastroenterology;  Laterality: N/A;    HYSTERECTOMY       Family History   Problem Relation Age of Onset    Hypertension Mother     Heart disease Sister     Heart disease Brother     Colon cancer Maternal Grandmother      Social History     Tobacco Use    Smoking status: Former     Current packs/day: 1.50     Average packs/day: 1.5 packs/day for 32.0 years (48.0 ttl pk-yrs)     Types: Cigarettes     Passive exposure: Past    Smokeless tobacco: Never   Vaping Use    Vaping status: Never Used   Substance Use Topics    Alcohol use: Never    Drug use: Never     Medications Prior to Admission   Medication Sig Dispense Refill Last Dose/Taking    aspirin 81 MG EC tablet Take 1 tablet by mouth Daily.   11/25/2024 Morning    calcium carbonate (OS-JEANA) 600 MG tablet Take 1 tablet by mouth Daily.   11/25/2024 Morning    Cholecalciferol 50 MCG (2000 UT) capsule Take 2,000 Int'l Units by mouth Daily.   11/25/2024 Morning    ferrous sulfate 325 (65 FE) MG tablet Take 1 tablet by mouth Daily With Breakfast.   11/25/2024 Morning    gabapentin (NEURONTIN) 100 MG capsule Take 1 capsule by mouth Every Night. 30 capsule 0 11/24/2024 Bedtime    hydrALAZINE (APRESOLINE) 50 MG tablet Take 1.5 tablets by mouth 2 (Two) Times a Day.   11/25/2024 Morning    hydroCHLOROthiazide  (HYDRODIURIL) 50 MG tablet Take 1 tablet by mouth Daily.   11/25/2024 Morning    HYDROcodone-acetaminophen (NORCO) 7.5-325 MG per tablet Take 1 tablet by mouth Every 6 (Six) Hours As Needed for Moderate Pain. 15 tablet 0 11/24/2024 Bedtime    Potassium 99 MG tablet Take 1 tablet by mouth 2 (Two) Times a Day.   11/25/2024 Morning    rOPINIRole (REQUIP) 0.5 MG tablet Take 2 tablets by mouth Every Night. Take 1 hour before bedtime.   11/24/2024 Bedtime    zinc gluconate 50 MG tablet Take 1 tablet by mouth Daily.   11/25/2024 Morning    albuterol sulfate  (90 Base) MCG/ACT inhaler Inhale 2 puffs Every 6 (Six) Hours As Needed.   11/22/2024 Evening    benzonatate (Tessalon Perles) 100 MG capsule Take 1 capsule by mouth 3 (Three) Times a Day As Needed for Cough. 30 capsule 1 11/18/2024 Bedtime    hydrOXYzine (ATARAX) 25 MG tablet Take 1 tablet by mouth At Night As Needed for Itching.       Probiotic Product (Probiotic Blend) capsule Take 1 capsule by mouth Daily. 90 capsule 3     triamcinolone (KENALOG) 0.1 % cream Apply 1 Application topically to the appropriate area as directed 2 (Two) Times a Day.   11/21/2024 Bedtime     Allergies:  Prednisone, Atorvastatin, Lisinopril, and Azithromycin    Immunization History   Administered Date(s) Administered    COVID-19 (PFIZER) Purple Cap Monovalent 06/08/2021, 06/18/2021, 07/09/2021, 07/09/2021, 12/20/2021    Hepatitis A 04/24/2018, 10/24/2018           REVIEW OF SYSTEMS:    Review of Systems   Constitutional: Negative.   HENT: Negative.     Eyes: Negative.    Cardiovascular: Negative.  Negative for chest pain and dyspnea on exertion.   Respiratory: Negative.     Endocrine: Negative.    Hematologic/Lymphatic: Negative.    Skin: Negative.    Musculoskeletal: Negative.    Gastrointestinal: Negative.    Genitourinary: Negative.    Neurological: Negative.    Psychiatric/Behavioral: Negative.     Allergic/Immunologic: Negative.        Vital Signs  Temp:  [97.8 °F (36.6 °C)-98.4  °F (36.9 °C)] 98.2 °F (36.8 °C)  Heart Rate:  [] 62  Resp:  [14-22] 22  BP: (141-192)/(59-80) 172/75          Physical Exam:  Physical Exam  Vitals and nursing note reviewed.   Constitutional:       Appearance: Normal appearance.   HENT:      Head: Normocephalic and atraumatic.      Right Ear: External ear normal.      Left Ear: External ear normal.      Nose: Nose normal.      Mouth/Throat:      Pharynx: Oropharynx is clear.   Eyes:      Extraocular Movements: Extraocular movements intact.      Conjunctiva/sclera: Conjunctivae normal.      Pupils: Pupils are equal, round, and reactive to light.   Cardiovascular:      Rate and Rhythm: Normal rate and regular rhythm.      Pulses: Normal pulses.   Pulmonary:      Effort: Pulmonary effort is normal.   Abdominal:      General: Bowel sounds are normal.      Palpations: Abdomen is soft.   Musculoskeletal:         General: Normal range of motion.      Cervical back: Normal range of motion.   Skin:     General: Skin is warm.      Capillary Refill: Capillary refill takes less than 2 seconds.   Neurological:      Mental Status: She is alert and oriented to person, place, and time.   Psychiatric:         Thought Content: Thought content normal.         Judgment: Judgment normal.         Emotional Behavior:    wnl   Debilities:   none  Results Review:    I reviewed the patient's new clinical results.  Lab Results (most recent)       Procedure Component Value Units Date/Time    BNP [[id number]]  (Normal) Collected: 11/26/24 0101    Specimen: Blood from Arm, Left Updated: 11/26/24 0739     proBNP 300.0 pg/mL     Narrative:      This assay is used as an aid in the diagnosis of individuals suspected of having heart failure. It can be used as an aid in the diagnosis of acute decompensated heart failure (ADHF) in patients presenting with signs and symptoms of ADHF to the emergency department (ED). In addition, NT-proBNP of <300 pg/mL indicates ADHF is not likely.    Age  Range Result Interpretation  NT-proBNP Concentration (pg/mL:      <50             Positive            >450                   Gray                 300-450                    Negative             <300    50-75           Positive            >900                  Gray                300-900                  Negative            <300      >75             Positive            >1800                  Gray                300-1800                  Negative            <300    TSH [[id number]]  (Normal) Collected: 11/26/24 0101    Specimen: Blood from Arm, Left Updated: 11/26/24 0739     TSH 2.540 uIU/mL     T4, Free [[id number]]  (Normal) Collected: 11/26/24 0101    Specimen: Blood from Arm, Left Updated: 11/26/24 0739     Free T4 1.09 ng/dL     Magnesium [[id number]]  (Normal) Collected: 11/26/24 0101    Specimen: Blood from Arm, Left Updated: 11/26/24 0739     Magnesium 1.9 mg/dL     POC Glucose Once [[id number]]  (Abnormal) Collected: 11/26/24 0735    Specimen: Blood Updated: 11/26/24 0737     Glucose 109 mg/dL      Comment: Serial Number: [id number]:  [id number]       Basic Metabolic Panel [[id number]]  (Abnormal) Collected: 11/26/24 0101    Specimen: Blood from Arm, Left Updated: 11/26/24 0145     Glucose 115 mg/dL      BUN 16 mg/dL      Creatinine 0.70 mg/dL      Sodium 139 mmol/L      Potassium 3.9 mmol/L      Chloride 104 mmol/L      CO2 25.8 mmol/L      Calcium 9.5 mg/dL      BUN/Creatinine Ratio 22.9     Anion Gap 9.2 mmol/L      eGFR 94.3 mL/min/1.73     Narrative:      GFR Normal >60  Chronic Kidney Disease <60  Kidney Failure <15      CBC & Differential [[id number]]  (Abnormal) Collected: 11/26/24 0101    Specimen: Blood from Arm, Left Updated: 11/26/24 0124    Narrative:      The following orders were created for panel order CBC & Differential.  Procedure                               Abnormality         Status                     ---------                               -----------         ------                      CBC Auto Differential[[id number]]        Abnormal            Final result                 Please view results for these tests on the individual orders.    CBC Auto Differential [[id number]]  (Abnormal) Collected: 11/26/24 0101    Specimen: Blood from Arm, Left Updated: 11/26/24 0124     WBC 8.59 10*3/mm3      RBC 4.00 10*6/mm3      Hemoglobin 11.4 g/dL      Hematocrit 36.0 %      MCV 90.0 fL      MCH 28.5 pg      MCHC 31.7 g/dL      RDW 14.4 %      RDW-SD 47.3 fl      MPV 9.0 fL      Platelets 267 10*3/mm3      Neutrophil % 54.9 %      Lymphocyte % 33.4 %      Monocyte % 7.1 %      Eosinophil % 4.0 %      Basophil % 0.3 %      Immature Grans % 0.3 %      Neutrophils, Absolute 4.71 10*3/mm3      Lymphocytes, Absolute 2.87 10*3/mm3      Monocytes, Absolute 0.61 10*3/mm3      Eosinophils, Absolute 0.34 10*3/mm3      Basophils, Absolute 0.03 10*3/mm3      Immature Grans, Absolute 0.03 10*3/mm3      nRBC 0.0 /100 WBC     Lipid Panel [[id number]]  (Abnormal) Collected: 11/25/24 1541    Specimen: Blood Updated: 11/25/24 1754     Total Cholesterol 145 mg/dL      Triglycerides 208 mg/dL      HDL Cholesterol 32 mg/dL      LDL Cholesterol  78 mg/dL      VLDL Cholesterol 35 mg/dL      LDL/HDL Ratio 2.23    Narrative:      Cholesterol Reference Ranges  (U.S. Department of Health and Human Services ATP III Classifications)    Desirable          <200 mg/dL  Borderline High    200-239 mg/dL  High Risk          >240 mg/dL      Triglyceride Reference Ranges  (U.S. Department of Health and Human Services ATP III Classifications)    Normal           <150 mg/dL  Borderline High  150-199 mg/dL  High             200-499 mg/dL  Very High        >500 mg/dL    HDL Reference Ranges  (U.S. Department of Health and Human Services ATP III Classifications)    Low     <40 mg/dl (major risk factor for CHD)  High    >60 mg/dl ('negative' risk factor for CHD)        LDL Reference Ranges  (U.S. Department of Health and Human Services ATP III  Classifications)    Optimal          <100 mg/dL  Near Optimal     100-129 mg/dL  Borderline High  130-159 mg/dL  High             160-189 mg/dL  Very High        >189 mg/dL    Hemoglobin A1c [[id number]]  (Abnormal) Collected: 11/25/24 1332    Specimen: Blood Updated: 11/25/24 1704     Hemoglobin A1C 6.76 %     High Sensitivity Troponin T 2Hr [[id number]]  (Normal) Collected: 11/25/24 1541    Specimen: Blood Updated: 11/25/24 1638     HS Troponin T 10 ng/L      Troponin T Delta 1 ng/L     Narrative:      High Sensitive Troponin T Reference Range:  <14.0 ng/L- Negative Female for AMI  <22.0 ng/L- Negative Male for AMI  >=14 - Abnormal Female indicating possible myocardial injury.  >=22 - Abnormal Male indicating possible myocardial injury.   Clinicians would have to utilize clinical acumen, EKG, Troponin, and serial changes to determine if it is an Acute Myocardial Infarction or myocardial injury due to an underlying chronic condition.         COVID-19, FLU A/B, RSV PCR 1 HR TAT - Swab, Nasopharynx [[id number]]  (Normal) Collected: 11/25/24 1411    Specimen: Swab from Nasopharynx Updated: 11/25/24 1453     COVID19 Not Detected     Influenza A PCR Not Detected     Influenza B PCR Not Detected     RSV, PCR Not Detected    Narrative:      Fact sheet for providers: https://www.fda.gov/media/258076/download    Fact sheet for patients: https://www.fda.gov/media/378931/download    Test performed by PCR.    D-dimer, Quantitative [[id number]]  (Normal) Collected: 11/25/24 1332    Specimen: Blood Updated: 11/25/24 1428     D-Dimer, Quantitative 0.64 MCGFEU/mL     Narrative:      According to the assay 's published package insert, a normal (<0.50 MCGFEU/mL) D-dimer result in conjunction with a non-high clinical probability assessment, excludes deep vein thrombosis (DVT) and pulmonary embolism (PE) with high sensitivity.    D-dimer values increase with age and this can make VTE exclusion of an older population difficult.  "To address this, the [employer], based on best available evidence and recent guidelines, recommends that clinicians use age-adjusted D-dimer thresholds in patients greater than 50 years of age with: a) a low probability of PE who do not meet all Pulmonary Embolism Rule Out Criteria, or b) in those with intermediate probability of PE.   The formula for an age-adjusted D-dimer cut-off is \"age/100\".  For example, a 60 year old patient would have an age-adjusted cut-off of 0.60 MCGFEU/mL and an 80 year old 0.80 MCGFEU/mL.    Comprehensive Metabolic Panel [[id number]]  (Abnormal) Collected: 11/25/24 1332    Specimen: Blood Updated: 11/25/24 1420     Glucose 124 mg/dL      BUN 16 mg/dL      Creatinine 0.77 mg/dL      Sodium 139 mmol/L      Potassium 4.1 mmol/L      Comment: Slight hemolysis detected by analyzer. Result may be falsely elevated.        Chloride 103 mmol/L      CO2 25.1 mmol/L      Calcium 9.6 mg/dL      Total Protein 6.7 g/dL      Albumin 4.1 g/dL      ALT (SGPT) 19 U/L      AST (SGOT) 28 U/L      Alkaline Phosphatase 83 U/L      Total Bilirubin 0.2 mg/dL      Globulin 2.6 gm/dL      A/G Ratio 1.6 g/dL      BUN/Creatinine Ratio 20.8     Anion Gap 10.9 mmol/L      eGFR 84.1 mL/min/1.73     Narrative:      GFR Normal >60  Chronic Kidney Disease <60  Kidney Failure <15      High Sensitivity Troponin T [[id number]]  (Normal) Collected: 11/25/24 1332    Specimen: Blood Updated: 11/25/24 1419     HS Troponin T 9 ng/L     Narrative:      High Sensitive Troponin T Reference Range:  <14.0 ng/L- Negative Female for AMI  <22.0 ng/L- Negative Male for AMI  >=14 - Abnormal Female indicating possible myocardial injury.  >=22 - Abnormal Male indicating possible myocardial injury.   Clinicians would have to utilize clinical acumen, EKG, Troponin, and serial changes to determine if it is an Acute Myocardial Infarction or myocardial injury due to an underlying chronic condition.         Protime-INR " [[id number]]  (Normal) Collected: 11/25/24 1332    Specimen: Blood Updated: 11/25/24 1403     Protime 13.1 Seconds      INR 0.99    aPTT [[id number]]  (Normal) Collected: 11/25/24 1332    Specimen: Blood Updated: 11/25/24 1403     PTT 33.5 seconds     CBC & Differential [[id number]]  (Abnormal) Collected: 11/25/24 1332    Specimen: Blood Updated: 11/25/24 1348    Narrative:      The following orders were created for panel order CBC & Differential.  Procedure                               Abnormality         Status                     ---------                               -----------         ------                     CBC Auto Differential[[id number]]        Abnormal            Final result                 Please view results for these tests on the individual orders.    CBC Auto Differential [[id number]]  (Abnormal) Collected: 11/25/24 1332    Specimen: Blood Updated: 11/25/24 1348     WBC 8.41 10*3/mm3      RBC 3.95 10*6/mm3      Hemoglobin 11.5 g/dL      Hematocrit 35.8 %      MCV 90.6 fL      MCH 29.1 pg      MCHC 32.1 g/dL      RDW 14.4 %      RDW-SD 47.3 fl      MPV 9.3 fL      Platelets 274 10*3/mm3      Neutrophil % 59.1 %      Lymphocyte % 29.8 %      Monocyte % 7.1 %      Eosinophil % 3.2 %      Basophil % 0.4 %      Immature Grans % 0.4 %      Neutrophils, Absolute 4.97 10*3/mm3      Lymphocytes, Absolute 2.51 10*3/mm3      Monocytes, Absolute 0.60 10*3/mm3      Eosinophils, Absolute 0.27 10*3/mm3      Basophils, Absolute 0.03 10*3/mm3      Immature Grans, Absolute 0.03 10*3/mm3      nRBC 0.0 /100 WBC     [place] Draw [[id number]] Collected: 11/25/24 1332    Specimen: Blood Updated: 11/25/24 1345    Narrative:      The following orders were created for panel order [place] Draw.  Procedure                               Abnormality         Status                     ---------                               -----------         ------                     Green Top (Gel)[[id number]]                                   Final result               Lavender Top[[id number]]                                     Final result               Gold Top - SST[[id number]]                                   Final result               Light Blue Top[[id number]]                                   Final result                 Please view results for these tests on the individual orders.    Green Top (Gel) [[id number]] Collected: 11/25/24 1332    Specimen: Blood Updated: 11/25/24 1345     Extra Tube Hold for add-ons.     Comment: Auto resulted.       Lavender Top [[id number]] Collected: 11/25/24 1332    Specimen: Blood Updated: 11/25/24 1345     Extra Tube hold for add-on     Comment: Auto resulted       Gold Top - SST [[id number]] Collected: 11/25/24 1332    Specimen: Blood Updated: 11/25/24 1345     Extra Tube Hold for add-ons.     Comment: Auto resulted.       Light Blue Top [[id number]] Collected: 11/25/24 1332    Specimen: Blood Updated: 11/25/24 1345     Extra Tube Hold for add-ons.     Comment: Auto resulted               Imaging Results (Most Recent)       Procedure Component Value Units Date/Time    XR Chest 1 View [[id number]] Collected: 11/25/24 1319     Updated: 11/25/24 1323    Narrative:      XR CHEST 1 VW    Date of Exam: 11/25/2024 1:10 PM EST    Indication: Chest Pain Triage Protocol    Comparison: Chest x-ray 1/28/2023    Findings:  The heart is stable in size. There are low lung volumes. Chronic interstitial changes are noted. No consolidation. No pneumothorax or pleural effusion.      Impression:      Impression:  Stable chest examination without acute cardiopulmonary process.      Electronically Signed: Keisha Quezada MD    11/25/2024 1:20 PM EST    Workstation ID: [id number]          reviewed    ECG/EMG Results (most recent)       Procedure Component Value Units Date/Time    Telemetry Scan [[id number]] Resulted: 11/25/24     Updated: 11/25/24 2100    Telemetry Scan [[id number]] Resulted: 11/25/24     Updated: 11/26/24 0434    ECG 12 Lead  ED Triage Standing Order; Chest Pain [[id number]] Collected: 11/25/24 1312     Updated: 11/26/24 0558     QT Interval 370 ms      QTC Interval 401 ms     Narrative:      HEART RATE=70  bpm  RR Kzhgesop=228  ms  KS Pbjferun=223  ms  P Horizontal Axis=-2  deg  P Front Axis=28  deg  QRSD Interval=76  ms  QT Yiphixjd=516  ms  HRzX=927  ms  QRS Axis=13  deg  T Wave Axis=25  deg  - ABNORMAL ECG -  Sinus rhythm  Probable  anteroseptal infarct, old  When compared with ECG of 07-Nov-2023 15:31:14,  New or worsened ischemia or infarction  Electronically Signed By: Zach Lacy (Mike) 2024-11-26 05:58:41  Date and Time of Study:2024-11-25 13:12:24          reviewed    Results for orders placed during the hospital encounter of 06/22/23    Doppler Ankle Brachial Index Single Level CAR    Interpretation Summary    Right Conclusion: The right JALEN is normal. Normal digital pressures.    Left Conclusion: The left JALEN is normal. Normal digital pressures.      Results for orders placed during the hospital encounter of 11/07/23    Adult Transthoracic Echo Complete W/ Cont if Necessary Per Protocol    Interpretation Summary    Left ventricular systolic function is normal. Calculated left ventricular EF = 59% Left ventricular ejection fraction appears to be 56 - 60%.    Left ventricular diastolic function is consistent with (grade I) impaired relaxation.    Estimated right ventricular systolic pressure from tricuspid regurgitation is normal (<35 mmHg).    No significant valvular abnormalities.      Microbiology Results (last 10 days)       Procedure Component Value - Date/Time    COVID-19, FLU A/B, RSV PCR 1 HR TAT - Swab, Nasopharynx [[id number]]  (Normal) Collected: 11/25/24 1411    Lab Status: Final result Specimen: Swab from Nasopharynx Updated: 11/25/24 1453     COVID19 Not Detected     Influenza A PCR Not Detected     Influenza B PCR Not Detected     RSV, PCR Not Detected    Narrative:      Fact sheet for providers:  https://www.fda.gov/media/367280/download    Fact sheet for patients: https://www.fda.gov/media/977167/download    Test performed by PCR.            Assessment & Plan     Chest pain     Chest pain  Lab Results   Component Value Date    TROPONINT 10 11/25/2024    TROPONINT 9 11/25/2024    TROPONINT 10 11/09/2023   -  -EKG: sinus rhythm without ST wave change   -Recent stress test and LHC this year   -Pulmonary hypertension ruled out   -COVID/flu/RSV negative   -Telemetry  -NPO    Hypertension  -Poorly Controlled   BP Readings from Last 1 Encounters:   11/26/24 172/75   - Continue lisinopril, Imdur, Losartan, and Norvasc  - DC hydralazine   - Monitor while admitted    Hyperlipidemia   -Continue statin    Diabetes mellitus Type 2   Lab Results   Component Value Date    GLUCOSE 115 (H) 11/26/2024    GLUCOSE 124 (H) 11/25/2024    GLUCOSE 118 (H) 02/07/2024    GLUCOSE 130 (H) 02/06/2024   - A1C 6.8  -SSI  -Diabetes education   -Diabetic diet  -Monitor before meals and at bedtime    Obesity  -BMI 36.6  -Lifestyle modifications     GERD  -Continue PPI    I discussed the patients findings and my recommendations with patient.     Discharge Diagnosis:      Chest pain      Hospital Course  Patient is a 69 y.o. female presented with chest pain.  Troponins 10, 910.  .  EKG sinus rhythm without ST-T wave change.  Patient had a recent stress test and cardiac catheterization this year with pulmonary hypertension ruled out.  COVID flu and RSV negative.  Patient seen by cardiology which recommended patient discontinue hydralazine and continue lisinopril, and/or losartan and Norvasc.  Patient to monitor blood glucoses and blood pressures at home.  Patient afebrile and hemodynamically stable.  Testing recommendations reviewed with patient she agreed to treatment plan.  If symptoms worsen patient to call 911 or go to nearest ED.    Past Medical History:     Past Medical History:   Diagnosis Date    Anxiety     Asthma  04/27/2016    Hyperlipidemia 03/02/2020    Hypertension 03/02/2020    Obesity (BMI 30-39.9) 03/02/2020    Osteopenia 03/02/2020    Sleep apnea     SOBOE (shortness of breath on exertion)        Past Surgical History:     Past Surgical History:   Procedure Laterality Date    CARDIAC CATHETERIZATION N/A 03/04/2020    Procedure: Left Heart Cath and coronary angiogram;  Surgeon: Hien Camacho MD;  Location: [facility] CATH INVASIVE LOCATION;  Service: Cardiovascular;  Laterality: N/A;    CARDIAC CATHETERIZATION N/A 03/04/2020    Procedure: Left ventriculography;  Surgeon: Hien Camacho MD;  Location: [facility] CATH INVASIVE LOCATION;  Service: Cardiovascular;  Laterality: N/A;    CARDIAC CATHETERIZATION N/A 01/30/2023    Procedure: Left Heart Cath and coronary angiogram;  Surgeon: Hien Camacho MD;  Location: [facility] CATH INVASIVE LOCATION;  Service: Cardiovascular;  Laterality: N/A;    CARDIAC CATHETERIZATION N/A 01/30/2023    Procedure: Right Heart Cath;  Surgeon: Hien Camacho MD;  Location: [facility] CATH INVASIVE LOCATION;  Service: Cardiovascular;  Laterality: N/A;    CHOLECYSTECTOMY WITH INTRAOPERATIVE CHOLANGIOGRAM N/A 2/6/2024    Procedure: CHOLECYSTECTOMY LAPAROSCOPIC INTRAOPERATIVE CHOLANGIOGRAM liver biopsy;  Surgeon: Tom Hernandez MD;  Location: [facility] MAIN OR;  Service: General;  Laterality: N/A;    COLONOSCOPY N/A 8/23/2024    Procedure: COLONOSCOPY with POLYPECTOMY X2 (COLD SNARE);  Surgeon: Bismark Cartagena MD;  Location: [facility] ENDOSCOPY;  Service: Gastroenterology;  Laterality: N/A;  POST; COLON POLYPS    ENDOSCOPY N/A 8/23/2024    Procedure: ESOPHAGOGASTRODUODENOSCOPY with BIOPSY;  Surgeon: Bismark Cartagena MD;  Location: [facility] ENDOSCOPY;  Service: Gastroenterology;  Laterality: N/A;    HYSTERECTOMY         Social History:   Social History     Socioeconomic History    Marital status:    Tobacco Use    Smoking status: Former     Current packs/day: 1.50     Average packs/day:  1.5 packs/day for 32.0 years (48.0 ttl pk-yrs)     Types: Cigarettes     Passive exposure: Past    Smokeless tobacco: Never   Vaping Use    Vaping status: Never Used   Substance and Sexual Activity    Alcohol use: Never    Drug use: Never    Sexual activity: Defer       Procedures Performed         Consults:   Consults       Date and Time Order Name Status Description    11/25/2024  3:56 PM Inpatient Cardiology Consult Completed             Condition on Discharge:     Stable    Discharge Disposition      Discharge Medications     Discharge Medications        Continue These Medications        Instructions Start Date   albuterol sulfate  (90 Base) MCG/ACT inhaler  Commonly known as: PROVENTIL HFA;VENTOLIN HFA;PROAIR HFA   2 puffs, Inhalation, Every 6 Hours PRN      aspirin 81 MG EC tablet   81 mg, Daily      benzonatate 100 MG capsule  Commonly known as: Tessalon Perles   100 mg, Oral, 3 Times Daily PRN      calcium carbonate 600 MG tablet  Commonly known as: OS-JEANA   600 mg, Daily      Cholecalciferol 50 MCG (2000 UT) capsule   2,000 Int'l Units, Daily      ferrous sulfate 325 (65 FE) MG tablet   325 mg, Daily With Breakfast      gabapentin 100 MG capsule  Commonly known as: NEURONTIN   100 mg, Oral, Nightly      hydrALAZINE 50 MG tablet  Commonly known as: APRESOLINE   75 mg, 2 Times Daily      hydroCHLOROthiazide 50 MG tablet   50 mg, Daily      HYDROcodone-acetaminophen 7.5-325 MG per tablet  Commonly known as: NORCO   1 tablet, Oral, Every 6 Hours PRN      hydrOXYzine 25 MG tablet  Commonly known as: ATARAX   25 mg, Oral, Nightly PRN      Potassium 99 MG tablet   1 tablet, 2 Times Daily      Probiotic Blend capsule   1 capsule, Oral, Daily      rOPINIRole 0.5 MG tablet  Commonly known as: REQUIP   1 mg, Nightly      triamcinolone 0.1 % cream  Commonly known as: KENALOG   1 Application, Topical, 2 Times Daily      zinc gluconate 50 MG tablet   50 mg, Daily               Discharge Diet:     Activity at  Discharge:     Follow-up Appointments  No future appointments.      Test Results Pending at Discharge  Pending Results       None             Risk for Readmission (LACE) Score: 3 (11/26/2024  6:00 AM)          ANGELINA Hernandez  11/26/24  08:59 EST        I spent 35 minutes caring for Scarlet on this date of service. This time includes time spent by me in the following activities: reviewing tests, performing a medically appropriate examination and/or evaluation, counseling and educating the patient/family/caregiver, referring and communicating with other health care professionals, documenting information in the medical record, independently interpreting results and communicating that information with the patient/family/caregiver, care coordination, ordering medications, ordering test(s), ordering procedure(s), obtaining a separately obtained history, and reviewing a separately obtained history.

## 2024-11-26 NOTE — PLAN OF CARE
Problem: Chest Pain  Goal: Resolution of Chest Pain Symptoms  Outcome: Progressing     Problem: Comorbidity Management  Goal: Blood Pressure in Desired Range  Outcome: Progressing  Intervention: Maintain Blood Pressure Management  Recent Flowsheet Documentation  Taken 11/26/2024 0209 by Leila Olsen, RN  Medication Review/Management: medications reviewed  Taken 11/26/2024 0010 by Leila Olsen, RN  Medication Review/Management: medications reviewed  Taken 11/25/2024 2206 by Leila Olsen, RN  Medication Review/Management: medications reviewed  Taken 11/25/2024 2015 by Leila Olsen, RN  Medication Review/Management: medications reviewed   Goal Outcome Evaluation:            No chest pain bp within desired range

## 2024-11-26 NOTE — PLAN OF CARE
Problem: Adult Inpatient Plan of Care  Goal: Plan of Care Review  Outcome: Met  Goal: Patient-Specific Goal (Individualized)  Outcome: Met  Goal: Absence of Hospital-Acquired Illness or Injury  Outcome: Met  Intervention: Identify and Manage Fall Risk  Recent Flowsheet Documentation  Taken 11/26/2024 1000 by Antoine Aguilar RN  Safety Promotion/Fall Prevention:   nonskid shoes/slippers when out of bed   toileting scheduled   safety round/check completed   clutter free environment maintained   assistive device/personal items within reach  Taken 11/26/2024 0801 by Antoine Aguilar RN  Safety Promotion/Fall Prevention:   nonskid shoes/slippers when out of bed   toileting scheduled   safety round/check completed   clutter free environment maintained   assistive device/personal items within reach  Intervention: Prevent Infection  Recent Flowsheet Documentation  Taken 11/26/2024 1000 by Antoine Aguilar RN  Infection Prevention: environmental surveillance performed  Taken 11/26/2024 0801 by Antoine Aguilar RN  Infection Prevention: environmental surveillance performed  Goal: Optimal Comfort and Wellbeing  Outcome: Met  Intervention: Provide Person-Centered Care  Recent Flowsheet Documentation  Taken 11/26/2024 0800 by Antoine Aguilar RN  Trust Relationship/Rapport:   care explained   choices provided   questions answered   questions encouraged  Goal: Readiness for Transition of Care  Outcome: Met     Problem: Chest Pain  Goal: Resolution of Chest Pain Symptoms  Outcome: Met     Problem: Comorbidity Management  Goal: Maintenance of Asthma Control  Outcome: Met  Intervention: Maintain Asthma Symptom Control  Recent Flowsheet Documentation  Taken 11/26/2024 1000 by Antoine Aguilar RN  Medication Review/Management: medications reviewed  Taken 11/26/2024 0801 by Antoine Aguilar RN  Medication Review/Management: medications reviewed  Goal: Blood Pressure in Desired Range  Outcome: Met  Intervention:  Maintain Blood Pressure Management  Recent Flowsheet Documentation  Taken 11/26/2024 1000 by Antoine Aguilar, RN  Medication Review/Management: medications reviewed  Taken 11/26/2024 0801 by Antoine Aguilar, RN  Medication Review/Management: medications reviewed   Goal Outcome Evaluation:

## 2024-11-26 NOTE — PROGRESS NOTES
Referring Provider: Brijesh Quan DO    Reason for follow-up: Chest pain     Patient Care Team:  Ruben Mejias MD as PCP - General (Family Medicine)  Tom Hernandez MD as Consulting Physician (General Surgery)      SUBJECTIVE  Resting comfortably in bed.  Denies any chest pain this morning     ROS  Review of all systems negative except as indicated.    Since I have last seen, the patient has been without any chest discomfort, shortness of breath, palpitations, dizziness or syncope.  Denies having any headache, abdominal pain, nausea, vomiting, diarrhea, constipation, loss of weight or loss of appetite.  Denies having any excessive bruising, hematuria or blood in the stool.        Personal History:    Past Medical History:   Diagnosis Date    Anxiety     Asthma 04/27/2016    Hyperlipidemia 03/02/2020    Hypertension 03/02/2020    Obesity (BMI 30-39.9) 03/02/2020    Osteopenia 03/02/2020    Sleep apnea     SOBOE (shortness of breath on exertion)        Past Surgical History:   Procedure Laterality Date    CARDIAC CATHETERIZATION N/A 03/04/2020    Procedure: Left Heart Cath and coronary angiogram;  Surgeon: Hien Camacho MD;  Location: [facility] CATH INVASIVE LOCATION;  Service: Cardiovascular;  Laterality: N/A;    CARDIAC CATHETERIZATION N/A 03/04/2020    Procedure: Left ventriculography;  Surgeon: Hien Camacho MD;  Location: [facility] CATH INVASIVE LOCATION;  Service: Cardiovascular;  Laterality: N/A;    CARDIAC CATHETERIZATION N/A 01/30/2023    Procedure: Left Heart Cath and coronary angiogram;  Surgeon: Hien Camacho MD;  Location: [facility] CATH INVASIVE LOCATION;  Service: Cardiovascular;  Laterality: N/A;    CARDIAC CATHETERIZATION N/A 01/30/2023    Procedure: Right Heart Cath;  Surgeon: Hien Camacho MD;  Location: [facility] CATH INVASIVE LOCATION;  Service: Cardiovascular;  Laterality: N/A;    CHOLECYSTECTOMY WITH INTRAOPERATIVE CHOLANGIOGRAM N/A 2/6/2024    Procedure: CHOLECYSTECTOMY  LAPAROSCOPIC INTRAOPERATIVE CHOLANGIOGRAM liver biopsy;  Surgeon: Tom Hernandez MD;  Location: [facility] MAIN OR;  Service: General;  Laterality: N/A;    COLONOSCOPY N/A 8/23/2024    Procedure: COLONOSCOPY with POLYPECTOMY X2 (COLD SNARE);  Surgeon: Bismark Cartagena MD;  Location: [facility] ENDOSCOPY;  Service: Gastroenterology;  Laterality: N/A;  POST; COLON POLYPS    ENDOSCOPY N/A 8/23/2024    Procedure: ESOPHAGOGASTRODUODENOSCOPY with BIOPSY;  Surgeon: Bismark Cartagena MD;  Location: [facility] ENDOSCOPY;  Service: Gastroenterology;  Laterality: N/A;    HYSTERECTOMY         Family History   Problem Relation Age of Onset    Hypertension Mother     Heart disease Sister     Heart disease Brother     Colon cancer Maternal Grandmother        Social History     Tobacco Use    Smoking status: Former     Current packs/day: 1.50     Average packs/day: 1.5 packs/day for 32.0 years (48.0 ttl pk-yrs)     Types: Cigarettes     Passive exposure: Past    Smokeless tobacco: Never   Vaping Use    Vaping status: Never Used   Substance Use Topics    Alcohol use: Never    Drug use: Never        Home meds:  Prior to Admission medications    Medication Sig Start Date End Date Taking? Authorizing Provider   aspirin 81 MG EC tablet Take 1 tablet by mouth Daily.   Yes Lorraine Goyal MD   calcium carbonate (OS-JEANA) 600 MG tablet Take 1 tablet by mouth Daily.   Yes Lorraine Goyla MD   Cholecalciferol 50 MCG (2000 UT) capsule Take 2,000 Int'l Units by mouth Daily. 12/5/20  Yes Lorraine Goyal MD   ferrous sulfate 325 (65 FE) MG tablet Take 1 tablet by mouth Daily With Breakfast.   Yes Lorraine Goyal MD   gabapentin (NEURONTIN) 100 MG capsule Take 1 capsule by mouth Every Night. 11/30/23  Yes Antoinette Bernstein APRN   hydrALAZINE (APRESOLINE) 50 MG tablet Take 1.5 tablets by mouth 2 (Two) Times a Day. 7/6/22  Yes Lorraine Goyal MD   hydroCHLOROthiazide (HYDRODIURIL) 50 MG tablet Take 1 tablet by mouth  Daily. 7/6/22  Yes Lorraine Goyal MD   HYDROcodone-acetaminophen (NORCO) 7.5-325 MG per tablet Take 1 tablet by mouth Every 6 (Six) Hours As Needed for Moderate Pain. 2/7/24  Yes Mike Tom DO   Potassium 99 MG tablet Take 1 tablet by mouth 2 (Two) Times a Day.   Yes Lorraine Goyal MD   rOPINIRole (REQUIP) 0.5 MG tablet Take 2 tablets by mouth Every Night. Take 1 hour before bedtime.   Yes Lorraine Goyal MD   zinc gluconate 50 MG tablet Take 1 tablet by mouth Daily. 12/5/20  Yes Lorraine Goyal MD   albuterol sulfate  (90 Base) MCG/ACT inhaler Inhale 2 puffs Every 6 (Six) Hours As Needed. 6/23/22   Lorraine Goyal MD   benzonatate (Tessalon Perles) 100 MG capsule Take 1 capsule by mouth 3 (Three) Times a Day As Needed for Cough. 11/17/23   Antoinette Bernstein APRN   hydrOXYzine (ATARAX) 25 MG tablet Take 1 tablet by mouth At Night As Needed for Itching.    Lorraine Goyal MD   Probiotic Product (Probiotic Blend) capsule Take 1 capsule by mouth Daily. 11/17/23   Antoinette Bernstein APRN   triamcinolone (KENALOG) 0.1 % cream Apply 1 Application topically to the appropriate area as directed 2 (Two) Times a Day.    ProviderLorraine MD       Allergies:  Prednisone, Atorvastatin, Lisinopril, and Azithromycin    Scheduled Meds:amLODIPine, 5 mg, Oral, Q24H  isosorbide mononitrate, 30 mg, Oral, Q24H  losartan, 50 mg, Oral, Q24H  pantoprazole, 40 mg, Oral, Q AM  sodium chloride, 10 mL, Intravenous, Q12H      Continuous Infusions:   PRN Meds:.  acetaminophen **OR** acetaminophen **OR** acetaminophen    senna-docusate sodium **AND** polyethylene glycol **AND** bisacodyl **AND** bisacodyl    nitroglycerin    ondansetron ODT **OR** ondansetron    sodium chloride    sodium chloride    sodium chloride      OBJECTIVE    Vital Signs  Vitals:    11/25/24 1753 11/25/24 1952 11/26/24 0045 11/26/24 0426   BP: 156/72   141/59   BP Location:    Right arm   Patient  "Position:    Lying   Pulse: 63   61   Resp:  20 17 21   Temp:  98.3 °F (36.8 °C) 98.2 °F (36.8 °C) 97.8 °F (36.6 °C)   TempSrc:  Oral Oral Oral   SpO2: 97%   97%   Weight:    90.7 kg (199 lb 15.3 oz)   Height:           Flowsheet Rows      Flowsheet Row First Filed Value   Admission Height 157.5 cm (62\") Documented at 11/25/2024 1257   Admission Weight 90.7 kg (200 lb) Documented at 11/25/2024 1257              Intake/Output Summary (Last 24 hours) at 11/26/2024 0619  Last data filed at 11/25/2024 1849  Gross per 24 hour   Intake 240 ml   Output --   Net 240 ml          Telemetry: Sinus rhythm    Physical Exam:  The patient is alert, oriented and in no distress.  Obese  Vital signs as noted above.  Head and neck revealed no carotid bruits or jugular venous distention.  No thyromegaly or lymphadenopathy is present  Lungs clear.  No wheezing.  Breath sounds are normal bilaterally.  Heart normal first and second heart sounds.  No murmur. No precordial rub is present.  No gallop is present.  Abdomen soft and nontender.  No organomegaly is present.  Extremities with good peripheral pulses without any pedal edema.  Skin warm and dry.  Musculoskeletal system is grossly normal.  CNS grossly normal.       Results Review:  I have personally reviewed the results from the time of this admission to 11/26/2024 06:19 EST and agree with these findings:  []  Laboratory  []  Microbiology  []  Radiology  []  EKG/Telemetry   []  Cardiology/Vascular   []  Pathology  []  Old records  []  Other:    Most notable findings include:    Lab Results (last 24 hours)       Procedure Component Value Units Date/Time    Basic Metabolic Panel [[id number]]  (Abnormal) Collected: 11/26/24 0101    Specimen: Blood from Arm, Left Updated: 11/26/24 0145     Glucose 115 mg/dL      BUN 16 mg/dL      Creatinine 0.70 mg/dL      Sodium 139 mmol/L      Potassium 3.9 mmol/L      Chloride 104 mmol/L      CO2 25.8 mmol/L      Calcium 9.5 mg/dL      BUN/Creatinine " Ratio 22.9     Anion Gap 9.2 mmol/L      eGFR 94.3 mL/min/1.73     Narrative:      GFR Normal >60  Chronic Kidney Disease <60  Kidney Failure <15      CBC & Differential [[id number]]  (Abnormal) Collected: 11/26/24 0101    Specimen: Blood from Arm, Left Updated: 11/26/24 0124    Narrative:      The following orders were created for panel order CBC & Differential.  Procedure                               Abnormality         Status                     ---------                               -----------         ------                     CBC Auto Differential[[id number]]        Abnormal            Final result                 Please view results for these tests on the individual orders.    CBC Auto Differential [[id number]]  (Abnormal) Collected: 11/26/24 0101    Specimen: Blood from Arm, Left Updated: 11/26/24 0124     WBC 8.59 10*3/mm3      RBC 4.00 10*6/mm3      Hemoglobin 11.4 g/dL      Hematocrit 36.0 %      MCV 90.0 fL      MCH 28.5 pg      MCHC 31.7 g/dL      RDW 14.4 %      RDW-SD 47.3 fl      MPV 9.0 fL      Platelets 267 10*3/mm3      Neutrophil % 54.9 %      Lymphocyte % 33.4 %      Monocyte % 7.1 %      Eosinophil % 4.0 %      Basophil % 0.3 %      Immature Grans % 0.3 %      Neutrophils, Absolute 4.71 10*3/mm3      Lymphocytes, Absolute 2.87 10*3/mm3      Monocytes, Absolute 0.61 10*3/mm3      Eosinophils, Absolute 0.34 10*3/mm3      Basophils, Absolute 0.03 10*3/mm3      Immature Grans, Absolute 0.03 10*3/mm3      nRBC 0.0 /100 WBC     Lipid Panel [[id number]]  (Abnormal) Collected: 11/25/24 1541    Specimen: Blood Updated: 11/25/24 1754     Total Cholesterol 145 mg/dL      Triglycerides 208 mg/dL      HDL Cholesterol 32 mg/dL      LDL Cholesterol  78 mg/dL      VLDL Cholesterol 35 mg/dL      LDL/HDL Ratio 2.23    Narrative:      Cholesterol Reference Ranges  (U.S. Department of Health and Human Services ATP III Classifications)    Desirable          <200 mg/dL  Borderline High    200-239 mg/dL  High Risk           >240 mg/dL      Triglyceride Reference Ranges  (U.S. Department of Health and Human Services ATP III Classifications)    Normal           <150 mg/dL  Borderline High  150-199 mg/dL  High             200-499 mg/dL  Very High        >500 mg/dL    HDL Reference Ranges  (U.S. Department of Health and Human Services ATP III Classifications)    Low     <40 mg/dl (major risk factor for CHD)  High    >60 mg/dl ('negative' risk factor for CHD)        LDL Reference Ranges  (U.S. Department of Health and Human Services ATP III Classifications)    Optimal          <100 mg/dL  Near Optimal     100-129 mg/dL  Borderline High  130-159 mg/dL  High             160-189 mg/dL  Very High        >189 mg/dL    Hemoglobin A1c [[id number]]  (Abnormal) Collected: 11/25/24 1332    Specimen: Blood Updated: 11/25/24 1704     Hemoglobin A1C 6.76 %     High Sensitivity Troponin T 2Hr [[id number]]  (Normal) Collected: 11/25/24 1541    Specimen: Blood Updated: 11/25/24 1638     HS Troponin T 10 ng/L      Troponin T Delta 1 ng/L     Narrative:      High Sensitive Troponin T Reference Range:  <14.0 ng/L- Negative Female for AMI  <22.0 ng/L- Negative Male for AMI  >=14 - Abnormal Female indicating possible myocardial injury.  >=22 - Abnormal Male indicating possible myocardial injury.   Clinicians would have to utilize clinical acumen, EKG, Troponin, and serial changes to determine if it is an Acute Myocardial Infarction or myocardial injury due to an underlying chronic condition.         COVID-19, FLU A/B, RSV PCR 1 HR TAT - Swab, Nasopharynx [[id number]]  (Normal) Collected: 11/25/24 1411    Specimen: Swab from Nasopharynx Updated: 11/25/24 1453     COVID19 Not Detected     Influenza A PCR Not Detected     Influenza B PCR Not Detected     RSV, PCR Not Detected    Narrative:      Fact sheet for providers: https://www.fda.gov/media/274838/download    Fact sheet for patients: https://www.fda.gov/media/879814/download    Test performed by PCR.     "D-dimer, Quantitative [[id number]]  (Normal) Collected: 11/25/24 1332    Specimen: Blood Updated: 11/25/24 1428     D-Dimer, Quantitative 0.64 MCGFEU/mL     Narrative:      According to the assay 's published package insert, a normal (<0.50 MCGFEU/mL) D-dimer result in conjunction with a non-high clinical probability assessment, excludes deep vein thrombosis (DVT) and pulmonary embolism (PE) with high sensitivity.    D-dimer values increase with age and this can make VTE exclusion of an older population difficult. To address this, the [employer], based on best available evidence and recent guidelines, recommends that clinicians use age-adjusted D-dimer thresholds in patients greater than 50 years of age with: a) a low probability of PE who do not meet all Pulmonary Embolism Rule Out Criteria, or b) in those with intermediate probability of PE.   The formula for an age-adjusted D-dimer cut-off is \"age/100\".  For example, a 60 year old patient would have an age-adjusted cut-off of 0.60 MCGFEU/mL and an 80 year old 0.80 MCGFEU/mL.    Comprehensive Metabolic Panel [[id number]]  (Abnormal) Collected: 11/25/24 1332    Specimen: Blood Updated: 11/25/24 1420     Glucose 124 mg/dL      BUN 16 mg/dL      Creatinine 0.77 mg/dL      Sodium 139 mmol/L      Potassium 4.1 mmol/L      Comment: Slight hemolysis detected by analyzer. Result may be falsely elevated.        Chloride 103 mmol/L      CO2 25.1 mmol/L      Calcium 9.6 mg/dL      Total Protein 6.7 g/dL      Albumin 4.1 g/dL      ALT (SGPT) 19 U/L      AST (SGOT) 28 U/L      Alkaline Phosphatase 83 U/L      Total Bilirubin 0.2 mg/dL      Globulin 2.6 gm/dL      A/G Ratio 1.6 g/dL      BUN/Creatinine Ratio 20.8     Anion Gap 10.9 mmol/L      eGFR 84.1 mL/min/1.73     Narrative:      GFR Normal >60  Chronic Kidney Disease <60  Kidney Failure <15      High Sensitivity Troponin T [[id number]]  (Normal) Collected: 11/25/24 1332    Specimen: Blood " Updated: 11/25/24 1419     HS Troponin T 9 ng/L     Narrative:      High Sensitive Troponin T Reference Range:  <14.0 ng/L- Negative Female for AMI  <22.0 ng/L- Negative Male for AMI  >=14 - Abnormal Female indicating possible myocardial injury.  >=22 - Abnormal Male indicating possible myocardial injury.   Clinicians would have to utilize clinical acumen, EKG, Troponin, and serial changes to determine if it is an Acute Myocardial Infarction or myocardial injury due to an underlying chronic condition.         Protime-INR [[id number]]  (Normal) Collected: 11/25/24 1332    Specimen: Blood Updated: 11/25/24 1403     Protime 13.1 Seconds      INR 0.99    aPTT [[id number]]  (Normal) Collected: 11/25/24 1332    Specimen: Blood Updated: 11/25/24 1403     PTT 33.5 seconds     CBC & Differential [[id number]]  (Abnormal) Collected: 11/25/24 1332    Specimen: Blood Updated: 11/25/24 1348    Narrative:      The following orders were created for panel order CBC & Differential.  Procedure                               Abnormality         Status                     ---------                               -----------         ------                     CBC Auto Differential[[id number]]        Abnormal            Final result                 Please view results for these tests on the individual orders.    CBC Auto Differential [[id number]]  (Abnormal) Collected: 11/25/24 1332    Specimen: Blood Updated: 11/25/24 1348     WBC 8.41 10*3/mm3      RBC 3.95 10*6/mm3      Hemoglobin 11.5 g/dL      Hematocrit 35.8 %      MCV 90.6 fL      MCH 29.1 pg      MCHC 32.1 g/dL      RDW 14.4 %      RDW-SD 47.3 fl      MPV 9.3 fL      Platelets 274 10*3/mm3      Neutrophil % 59.1 %      Lymphocyte % 29.8 %      Monocyte % 7.1 %      Eosinophil % 3.2 %      Basophil % 0.4 %      Immature Grans % 0.4 %      Neutrophils, Absolute 4.97 10*3/mm3      Lymphocytes, Absolute 2.51 10*3/mm3      Monocytes, Absolute 0.60 10*3/mm3      Eosinophils, Absolute 0.27  10*3/mm3      Basophils, Absolute 0.03 10*3/mm3      Immature Grans, Absolute 0.03 10*3/mm3      nRBC 0.0 /100 WBC     [place] Draw [[id number]] Collected: 11/25/24 1332    Specimen: Blood Updated: 11/25/24 1345    Narrative:      The following orders were created for panel order [place] Draw.  Procedure                               Abnormality         Status                     ---------                               -----------         ------                     Green Top (Gel)[[id number]]                                  Final result               Lavender Top[[id number]]                                     Final result               Gold Top - SST[[id number]]                                   Final result               Light Blue Top[[id number]]                                   Final result                 Please view results for these tests on the individual orders.    Green Top (Gel) [[id number]] Collected: 11/25/24 1332    Specimen: Blood Updated: 11/25/24 1345     Extra Tube Hold for add-ons.     Comment: Auto resulted.       Lavender Top [[id number]] Collected: 11/25/24 1332    Specimen: Blood Updated: 11/25/24 1345     Extra Tube hold for add-on     Comment: Auto resulted       Gold Top - SST [[id number]] Collected: 11/25/24 1332    Specimen: Blood Updated: 11/25/24 1345     Extra Tube Hold for add-ons.     Comment: Auto resulted.       Light Blue Top [[id number]] Collected: 11/25/24 1332    Specimen: Blood Updated: 11/25/24 1345     Extra Tube Hold for add-ons.     Comment: Auto resulted               Imaging Results (Last 24 Hours)       Procedure Component Value Units Date/Time    XR Chest 1 View [[id number]] Collected: 11/25/24 1319     Updated: 11/25/24 1323    Narrative:      XR CHEST 1 VW    Date of Exam: 11/25/2024 1:10 PM EST    Indication: Chest Pain Triage Protocol    Comparison: Chest x-ray 1/28/2023    Findings:  The heart is stable in size. There are low lung volumes. Chronic interstitial changes are  noted. No consolidation. No pneumothorax or pleural effusion.      Impression:      Impression:  Stable chest examination without acute cardiopulmonary process.      Electronically Signed: Keisha Quezada MD    11/25/2024 1:20 PM EST    Workstation ID: [id number]            LAB RESULTS (LAST 7 DAYS)    CBC  Results from last 7 days   Lab Units 11/26/24  0101 11/25/24  1332   WBC 10*3/mm3 8.59 8.41   RBC 10*6/mm3 4.00 3.95   HEMOGLOBIN g/dL 11.4* 11.5*   HEMATOCRIT % 36.0 35.8   MCV fL 90.0 90.6   PLATELETS 10*3/mm3 267 274       BMP  Results from last 7 days   Lab Units 11/26/24  0101 11/25/24  1332   SODIUM mmol/L 139 139   POTASSIUM mmol/L 3.9 4.1   CHLORIDE mmol/L 104 103   CO2 mmol/L 25.8 25.1   BUN mg/dL 16 16   CREATININE mg/dL 0.70 0.77   GLUCOSE mg/dL 115* 124*       CMP   Results from last 7 days   Lab Units 11/26/24  0101 11/25/24  1332   SODIUM mmol/L 139 139   POTASSIUM mmol/L 3.9 4.1   CHLORIDE mmol/L 104 103   CO2 mmol/L 25.8 25.1   BUN mg/dL 16 16   CREATININE mg/dL 0.70 0.77   GLUCOSE mg/dL 115* 124*   ALBUMIN g/dL  --  4.1   BILIRUBIN mg/dL  --  0.2   ALK PHOS U/L  --  83   AST (SGOT) U/L  --  28   ALT (SGPT) U/L  --  19       BNP        TROPONIN  Results from last 7 days   Lab Units 11/25/24  1541   HSTROP T ng/L 10       CoAg  Results from last 7 days   Lab Units 11/25/24  1332   INR  0.99   APTT seconds 33.5       Creatinine Clearance  Estimated Creatinine Clearance: 80.5 mL/min (by C-G formula based on SCr of 0.7 mg/dL).    ABG        Radiology  XR Chest 1 View    Result Date: 11/25/2024  Impression: Stable chest examination without acute cardiopulmonary process. Electronically Signed: Keisha Quezada MD  11/25/2024 1:20 PM EST  Workstation ID: [id number]       EKG  I personally viewed and interpreted the patient's EKG/Telemetry data:  ECG 12 Lead ED Triage Standing Order; Chest Pain   Final Result   HEART RATE=70  bpm   RR Wqaqwbgj=342  ms   TN Mejhlgub=218  ms   P Horizontal Axis=-2  deg   P  Front Axis=28  deg   QRSD Interval=76  ms   QT Snzgcwlz=882  ms   BYtM=027  ms   QRS Axis=13  deg   T Wave Axis=25  deg   - ABNORMAL ECG -   Sinus rhythm   Probable  anteroseptal infarct, old   When compared with ECG of 07-Nov-2023 15:31:14,   New or worsened ischemia or infarction   Electronically Signed By: Zach Lacy (Mike) 2024-11-26 05:58:41   Date and Time of Study:2024-11-25 13:12:24      Telemetry Scan   Final Result      Telemetry Scan   Final Result            Echocardiogram:    Results for orders placed during the hospital encounter of 11/07/23    Adult Transthoracic Echo Complete W/ Cont if Necessary Per Protocol    Interpretation Summary    Left ventricular systolic function is normal. Calculated left ventricular EF = 59% Left ventricular ejection fraction appears to be 56 - 60%.    Left ventricular diastolic function is consistent with (grade I) impaired relaxation.    Estimated right ventricular systolic pressure from tricuspid regurgitation is normal (<35 mmHg).    No significant valvular abnormalities.        Stress Test:  Results for orders placed during the hospital encounter of 11/07/23    Stress Test With Myocardial Perfusion One Day    Interpretation Summary  Indications  Chest pain    This study was performed under the direct supervision of Chayito HUMMEL.    Resting ECG  Sinus rhythm    The patient was injected with Lexiscan intravenously while constantly monitoring electrocardiogram and vital signs.  Patient did not have any chest discomfort ST abnormalities or ectopy with injection of Lexiscan.    Cardiolite was used as an imaging agent.    Cardiolite images showed uniform distribution of radionuclide without any evidence for myocardial ischemia.  Other images showed reversibility.  Probably artifactual.    Gated SPECT images revealed normal left ventricle size and contractility with ejection fraction of 85%.    Impression  ========  Lexiscan Cardiolite test showed uniform distribution of  radionuclide although other images showed reversibility.  Probably artifactual.  Gated SPECT images revealed normal left ventricular size and contractility with ejection fraction of 85%.         Cardiac Catheterization:  Results for orders placed during the hospital encounter of 01/28/23    Cardiac Catheterization/Vascular Study    Conclusion  CARDIAC CATHETERIZATION REPORT    DATE OF PROCEDURE:  1/30/2023    INDICATION FOR PROCEDURE:  Shortness of breath  Chest discomfort  Abnormal stress nuclear test    PROCEDURE PERFORMED:  Right heart catheterization  Left heart catheterization, coronary angiography, left ventriculography  @@  Moderate conscious sedation was utilized with intravenous Versed and fentanyl administered by registered nurse with continuous ECG, pulse oximetry and hemodynamic monitoring supervised by myself throughout the entire procedure.  Conscious sedation time   30 minutes    I was present with the patient for the duration of moderate sedation and supervised staff who had no other duties and monitored the patient for the entire procedure.    @@  PROCEDURE COMMENTS:    Under usual sterile precautions and 1% Xylocaine filtration right femoral vein and femoral artery were percutaneously punctured and a 7 and 5 [place] vascular sheaths were respectively inserted.  [place]-Debi catheter was used to measure right-sided pressures pulmonary capillary wedge pressure and cardiac output using thermodilution technique.  [place]-Debi catheter was removed and subsequently left and right coronary arteriography was performed in varying degrees of obliquity followed by left ventricular angiogram.  Hemostasis was obtained and patient was returned to the room with intact pulses.  No complications were noted.    FINDINGS:    1. HEMODYNAMICS:  Left ventricle end-diastolic pressure is normal.  No gradient was noted across aortic valve.  Mean right atrial pressure 9 pulmonary artery 35/12 mean pulmonary artery 27 pulmonary  capital wedge pressure is 20.    2. LEFT VENTRICULOGRAPHY:  Left ventricular size and contractility is normal with ejection fraction of 60%.  No mitral regurgitation is present.    3. CORONARY ANGIOGRAPHY:  Left main coronary artery is normal.  Left anterior descending artery is normal.  Diagonal branch has proximal 50% disease.  Circumflex coronary artery is normal.  Right coronary artery is a large and dominant vessel and is normal.    SUMMARY:  No evidence for pulmonary hypertension is present.  Normal left ventricular size and contractility with ejection fraction of 60%.  Normal epicardial coronary arteries except for 50% proximal diagonal branch disease.    RECOMMENDATIONS:  Noncardiac work-up.         Other:         ASSESSMENT & PLAN:    Principal Problem:    Chest pain    Chest pain  ECG shows normal sinus rhythm: No ischemic changes.  High-sensitivity troponin is negative x 2  Reviewed previous stress test and cardiac catheterization  Most recent coronary angiography in 2023 with nonobstructive coronary disease.  Previous right heart cath also ruled out pulmonary hypertension  Initiated medical therapy for microvascular angina.  Amlodipine and Imdur have been added  I do not see the need to repeat ischemic workup at this time  Also started PPI     Hypertension  She was on hydralazine and hydrochlorothiazide  We will stop hydralazine and start her on  Imdur  Blood pressure is much better on amlodipine, Imdur and losartan.  Will uptitrate as needed: Increase losartan to 100 mg p.o. daily  She reports cough with lisinopril     HFpEF  HFpEF secondary to hypertension and obesity.  Echocardiogram shows preserved LV function with grade 1 diastolic dysfunction  Will consider adding hydrochlorothiazide or Lasix during follow-up     Hyperlipidemia/diabetes  LDL 78, HDL 32, triglyceride 208, total cholesterol 145.  A1c is 6.8  She reports intolerance to atorvastatin  LDL is at goal  Consider starting metformin      Obesity  BMI is 36.6.  She weighs 200 pounds.  Lifestyle modifications recommended to the patient  Screening and treatment for sleep apnea recommended      Navdeep Mosquera MD  11/26/24  06:19 EST

## 2024-12-02 ENCOUNTER — TELEPHONE (OUTPATIENT)
Dept: CARDIOLOGY | Facility: CLINIC | Age: 69
End: 2024-12-02
Payer: MEDICARE

## 2024-12-02 NOTE — TELEPHONE ENCOUNTER
Patient est with Dr. Camacho. She called in requesting to est care with Dr. Arnold. Made apt 1/13/25 with Dr. Arnold.

## 2025-01-13 ENCOUNTER — OFFICE VISIT (OUTPATIENT)
Dept: CARDIOLOGY | Facility: CLINIC | Age: 70
End: 2025-01-13
Payer: MEDICARE

## 2025-01-13 VITALS
HEIGHT: 62 IN | HEART RATE: 71 BPM | BODY MASS INDEX: 39.93 KG/M2 | SYSTOLIC BLOOD PRESSURE: 135 MMHG | DIASTOLIC BLOOD PRESSURE: 45 MMHG | WEIGHT: 217 LBS | OXYGEN SATURATION: 97 %

## 2025-01-13 DIAGNOSIS — I10 ESSENTIAL HYPERTENSION: ICD-10-CM

## 2025-01-13 DIAGNOSIS — I20.89 CHRONIC STABLE ANGINA: Primary | ICD-10-CM

## 2025-01-13 DIAGNOSIS — Z78.9 STATIN INTOLERANCE: ICD-10-CM

## 2025-01-13 DIAGNOSIS — E66.9 OBESITY (BMI 30-39.9): ICD-10-CM

## 2025-01-13 DIAGNOSIS — E78.5 DYSLIPIDEMIA: ICD-10-CM

## 2025-01-13 DIAGNOSIS — E11.9 TYPE 2 DIABETES MELLITUS WITHOUT COMPLICATION, WITHOUT LONG-TERM CURRENT USE OF INSULIN: ICD-10-CM

## 2025-01-13 PROCEDURE — 1159F MED LIST DOCD IN RCRD: CPT | Performed by: INTERNAL MEDICINE

## 2025-01-13 PROCEDURE — 1160F RVW MEDS BY RX/DR IN RCRD: CPT | Performed by: INTERNAL MEDICINE

## 2025-01-13 PROCEDURE — 3078F DIAST BP <80 MM HG: CPT | Performed by: INTERNAL MEDICINE

## 2025-01-13 PROCEDURE — 3075F SYST BP GE 130 - 139MM HG: CPT | Performed by: INTERNAL MEDICINE

## 2025-01-13 PROCEDURE — 99214 OFFICE O/P EST MOD 30 MIN: CPT | Performed by: INTERNAL MEDICINE

## 2025-01-13 NOTE — PROGRESS NOTES
Subjective:     Encounter Date:01/13/2025      Patient ID: Scarlet Gong is a 69 y.o. female.    Chief Complaint and history of present illness: Establish cardiac care    History of present illness:     Ms. Scarlet Gong has PMH of    CAD, cardiac cath 1/28/2023 reveals 50% diagonal 1 branch  Hypertension  Hyperlipidemia  Obesity with BMI over 30  Anxiety, asthma, osteopenia  KAYA  Former smoker    Here to establish cardiac care.  Patient had multiple caths in the past which revealed noncritical disease.  Patient is complaining of shortness of breath dyspnea on exertion lightheadedness dizziness and edema.  Patient was having recurrent chest pains and recently stopped after giving Imdur and amlodipine.    Patient's arterial blood pressure is 135/45, heart rate 71, O2 sat of 97% on room air.  BMI is over 39.    Review of records:    Labs 11/25/2024 reveal hemoglobin A1c of 6.76.  Labs 11/26/2024 reveal normal TSH, proBNP of 300.  Normal CBC.  BMP with glucose of 115 and 124.  Lipid profile with cholesterol 145, triglycerides 208, HDL 32, LDL 78.    Lexiscan Cardiolite 3/3/2020 reveal mild inferior and apical ischemia, EF of 60%  Stress Cardiolite 1/29/2023 revealed small to medium sized, mild to moderate severity ischemia located in basal inferolateral wall.  EF 65%.  Stress Cardiolite 11/8/2023 normal perfusion EF of 85%  CT PE study 11/7/2023 negative for PE.  Normal thoracic aorta    Assessment:    Chronic stable angina  Possible microvascular disease/syndrome X  Hypertension  Dyslipidemia  Diabetes  Obesity with BMI of 39    Plan:    Reviewed extensive records and summarized in HPI.  Continue medical management with aspirin, amlodipine, isosorbide, hydrochlorothiazide, losartan as tolerated.  Patient reportedly has allergy to atorvastatin.  Will hold off on statins.  Will check labs before visit.  Reviewed BMI over 30 counseled on weight loss diet and exercise.      Procedures    Copied text in this  portion of the note has been reviewed and is accurate as of 1/13/2025  The following portions of the patient's history were reviewed and updated as appropriate: allergies, current medications, past family history, past medical history, past social history, past surgical history and problem list.    Assessment:         [facility]       Diagnosis Plan   1. Chronic stable angina  Comprehensive Metabolic Panel    Lipid Panel    Hemoglobin A1c    TSH      2. Essential hypertension  Comprehensive Metabolic Panel    Lipid Panel    Hemoglobin A1c    TSH      3. Dyslipidemia  Comprehensive Metabolic Panel    Lipid Panel    Hemoglobin A1c    TSH      4. Type 2 diabetes mellitus without complication, without long-term current use of insulin  Comprehensive Metabolic Panel    Lipid Panel    Hemoglobin A1c    TSH      5. Obesity (BMI 30-39.9)  Comprehensive Metabolic Panel    Lipid Panel    Hemoglobin A1c    TSH      6. Statin intolerance  Comprehensive Metabolic Panel    Lipid Panel    Hemoglobin A1c    TSH             Plan:               Past Medical History:  Past Medical History:   Diagnosis Date    Anxiety     Asthma 04/27/2016    Hyperlipidemia 03/02/2020    Hypertension 03/02/2020    Obesity (BMI 30-39.9) 03/02/2020    Osteopenia 03/02/2020    Sleep apnea     SOBOE (shortness of breath on exertion)      Past Surgical History:  Past Surgical History:   Procedure Laterality Date    CARDIAC CATHETERIZATION N/A 03/04/2020    Procedure: Left Heart Cath and coronary angiogram;  Surgeon: Hien Camacho MD;  Location: [facility] CATH INVASIVE LOCATION;  Service: Cardiovascular;  Laterality: N/A;    CARDIAC CATHETERIZATION N/A 03/04/2020    Procedure: Left ventriculography;  Surgeon: Hien Camacho MD;  Location: [facility] CATH INVASIVE LOCATION;  Service: Cardiovascular;  Laterality: N/A;    CARDIAC CATHETERIZATION N/A 01/30/2023    Procedure: Left Heart Cath and coronary angiogram;  Surgeon: Hien Camacho MD;  Location: [facility]  "INVASIVE LOCATION;  Service: Cardiovascular;  Laterality: N/A;    CARDIAC CATHETERIZATION N/A 01/30/2023    Procedure: Right Heart Cath;  Surgeon: Hien Camacho MD;  Location: [facility] CATH INVASIVE LOCATION;  Service: Cardiovascular;  Laterality: N/A;    CHOLECYSTECTOMY WITH INTRAOPERATIVE CHOLANGIOGRAM N/A 2/6/2024    Procedure: CHOLECYSTECTOMY LAPAROSCOPIC INTRAOPERATIVE CHOLANGIOGRAM liver biopsy;  Surgeon: Tom Hernandez MD;  Location: [facility] MAIN OR;  Service: General;  Laterality: N/A;    COLONOSCOPY N/A 8/23/2024    Procedure: COLONOSCOPY with POLYPECTOMY X2 (COLD SNARE);  Surgeon: Bismark Cartagena MD;  Location: [facility] ENDOSCOPY;  Service: Gastroenterology;  Laterality: N/A;  POST; COLON POLYPS    ENDOSCOPY N/A 8/23/2024    Procedure: ESOPHAGOGASTRODUODENOSCOPY with BIOPSY;  Surgeon: Bismark Cartagena MD;  Location: [facility] ENDOSCOPY;  Service: Gastroenterology;  Laterality: N/A;    HYSTERECTOMY        Allergies:  Allergies   Allergen Reactions    Prednisone Rash    Atorvastatin Itching    Lisinopril Cough    Azithromycin Rash     Re-entered from allergy \"Z-Pack\"     Home Meds:  Current Meds:     Current Outpatient Medications:     albuterol sulfate  (90 Base) MCG/ACT inhaler, Inhale 2 puffs Every 6 (Six) Hours As Needed., Disp: , Rfl:     amLODIPine (NORVASC) 5 MG tablet, Take 1 tablet by mouth Daily., Disp: 30 tablet, Rfl: 0    aspirin 81 MG EC tablet, Take 1 tablet by mouth Daily., Disp: , Rfl:     benzonatate (Tessalon Perles) 100 MG capsule, Take 1 capsule by mouth 3 (Three) Times a Day As Needed for Cough., Disp: 30 capsule, Rfl: 1    calcium carbonate (OS-JEANA) 600 MG tablet, Take 1 tablet by mouth Daily., Disp: , Rfl:     Cholecalciferol 50 MCG (2000 UT) capsule, Take 2,000 Int'l Units by mouth Daily., Disp: , Rfl:     ferrous sulfate 325 (65 FE) MG tablet, Take 1 tablet by mouth Daily With Breakfast., Disp: , Rfl:     gabapentin (NEURONTIN) 100 MG capsule, Take 1 capsule by mouth " Every Night., Disp: 30 capsule, Rfl: 0    hydroCHLOROthiazide (HYDRODIURIL) 50 MG tablet, Take 1 tablet by mouth Daily., Disp: , Rfl:     HYDROcodone-acetaminophen (NORCO) 7.5-325 MG per tablet, Take 1 tablet by mouth Every 6 (Six) Hours As Needed for Moderate Pain., Disp: 15 tablet, Rfl: 0    hydrOXYzine (ATARAX) 25 MG tablet, Take 1 tablet by mouth At Night As Needed for Itching., Disp: , Rfl:     isosorbide mononitrate (IMDUR) 30 MG 24 hr tablet, Take 1 tablet by mouth Daily., Disp: 30 tablet, Rfl: 0    losartan (COZAAR) 100 MG tablet, Take 1 tablet by mouth Daily., Disp: 30 tablet, Rfl: 0    pantoprazole (PROTONIX) 40 MG EC tablet, Take 1 tablet by mouth Every Morning., Disp: 30 tablet, Rfl: 0    Potassium 99 MG tablet, Take 1 tablet by mouth 2 (Two) Times a Day., Disp: , Rfl:     Probiotic Product (Probiotic Blend) capsule, Take 1 capsule by mouth Daily., Disp: 90 capsule, Rfl: 3    rOPINIRole (REQUIP) 0.5 MG tablet, Take 2 tablets by mouth Every Night. Take 1 hour before bedtime., Disp: , Rfl:     triamcinolone (KENALOG) 0.1 % cream, Apply 1 Application topically to the appropriate area as directed 2 (Two) Times a Day., Disp: , Rfl:     zinc gluconate 50 MG tablet, Take 1 tablet by mouth Daily., Disp: , Rfl:   Social History:   Social History     Tobacco Use    Smoking status: Former     Current packs/day: 1.50     Average packs/day: 1.5 packs/day for 32.0 years (48.0 ttl pk-yrs)     Types: Cigarettes     Passive exposure: Past    Smokeless tobacco: Never   Substance Use Topics    Alcohol use: Never      Family History:  Family History   Problem Relation Age of Onset    Hypertension Mother     Heart disease Sister     Heart disease Brother     Colon cancer Maternal Grandmother               Review of Systems   Constitutional: Negative for malaise/fatigue.   Cardiovascular:  Positive for leg swelling. Negative for chest pain and palpitations.   Respiratory:  Positive for shortness of breath.    Skin:  Negative  "for rash.   Neurological:  Positive for dizziness and light-headedness. Negative for numbness.     All other systems are negative         Objective:     Physical Exam  /45   Pulse 71   Ht 157.5 cm (62\")   Wt 98.4 kg (217 lb)   SpO2 97%   BMI 39.69 kg/m²   General:  Appears in no acute distress  Eyes: Sclera is anicteric,  conjunctiva is clear   HEENT:  No JVD.  No carotid bruits  Respiratory: Respirations regular and unlabored at rest.  Clear to auscultation  Cardiovascular: S1,S2 Regular rate and rhythm. No murmur, rub or gallop auscultated.   Extremities: No digital clubbing or cyanosis, no edema  Skin: Color pink. Skin warm and dry to touch. No rashes  No xanthoma  Neuro: Alert and awake.    Lab Reviewed:         Angel Arnold MD  1/13/2025 14:17 EST      [facility] Dragon/Transcription:   \"Dictated utilizing Dragon dictation\".        "

## 2025-05-21 ENCOUNTER — TRANSCRIBE ORDERS (OUTPATIENT)
Dept: ADMINISTRATIVE | Facility: HOSPITAL | Age: 70
End: 2025-05-21
Payer: MEDICARE

## 2025-05-21 DIAGNOSIS — M54.50 LOW BACK PAIN, UNSPECIFIED BACK PAIN LATERALITY, UNSPECIFIED CHRONICITY, UNSPECIFIED WHETHER SCIATICA PRESENT: Primary | ICD-10-CM

## 2025-05-23 ENCOUNTER — HOSPITAL ENCOUNTER (OUTPATIENT)
Dept: GENERAL RADIOLOGY | Facility: HOSPITAL | Age: 70
Discharge: HOME | End: 2025-05-23
Payer: MEDICARE

## 2025-05-23 DIAGNOSIS — M54.50 LOW BACK PAIN, UNSPECIFIED BACK PAIN LATERALITY, UNSPECIFIED CHRONICITY, UNSPECIFIED WHETHER SCIATICA PRESENT: ICD-10-CM

## 2025-05-23 PROCEDURE — 72114 X-RAY EXAM L-S SPINE BENDING: CPT

## 2025-06-18 ENCOUNTER — TRANSCRIBE ORDERS (OUTPATIENT)
Dept: ADMINISTRATIVE | Facility: HOSPITAL | Age: 70
End: 2025-06-18
Payer: MEDICARE

## 2025-06-18 DIAGNOSIS — Z12.31 BREAST CANCER SCREENING BY MAMMOGRAM: Primary | ICD-10-CM

## 2025-06-20 ENCOUNTER — APPOINTMENT (OUTPATIENT)
Dept: MRI IMAGING | Facility: HOSPITAL | Age: 70
End: 2025-06-20
Payer: MEDICARE

## 2025-06-20 ENCOUNTER — APPOINTMENT (OUTPATIENT)
Dept: GENERAL RADIOLOGY | Facility: HOSPITAL | Age: 70
End: 2025-06-20
Payer: MEDICARE

## 2025-06-20 ENCOUNTER — HOSPITAL ENCOUNTER (OUTPATIENT)
Facility: HOSPITAL | Age: 70
Setting detail: OBSERVATION
Discharge: HOME | End: 2025-06-21
Attending: EMERGENCY MEDICINE | Admitting: EMERGENCY MEDICINE
Payer: MEDICARE

## 2025-06-20 DIAGNOSIS — R42 SEVERE VERTIGO: Primary | ICD-10-CM

## 2025-06-20 DIAGNOSIS — R07.9 CHEST PAIN, UNSPECIFIED TYPE: ICD-10-CM

## 2025-06-20 LAB
ALBUMIN SERPL-MCNC: 4.4 G/DL (ref 3.5–5.2)
ALBUMIN/GLOB SERPL: 1.6 G/DL
ALP SERPL-CCNC: 93 U/L (ref 39–117)
ALT SERPL W P-5'-P-CCNC: 21 U/L (ref 1–33)
ANION GAP SERPL CALCULATED.3IONS-SCNC: 13.2 MMOL/L (ref 5–15)
AST SERPL-CCNC: 32 U/L (ref 1–32)
BASOPHILS # BLD AUTO: 0.02 10*3/MM3 (ref 0–0.2)
BASOPHILS NFR BLD AUTO: 0.2 % (ref 0–1.5)
BILIRUB SERPL-MCNC: 0.5 MG/DL (ref 0–1.2)
BUN SERPL-MCNC: 23 MG/DL (ref 8–23)
BUN/CREAT SERPL: 22.5 (ref 7–25)
CALCIUM SPEC-SCNC: 10.1 MG/DL (ref 8.6–10.5)
CHLORIDE SERPL-SCNC: 102 MMOL/L (ref 98–107)
CO2 SERPL-SCNC: 23.8 MMOL/L (ref 22–29)
COHGB MFR BLD: 2.2 % (ref 0–3)
CREAT SERPL-MCNC: 1.02 MG/DL (ref 0.57–1)
DEPRECATED RDW RBC AUTO: 45.5 FL (ref 37–54)
EGFRCR SERPLBLD CKD-EPI 2021: 59.7 ML/MIN/1.73
EOSINOPHIL # BLD AUTO: 0.16 10*3/MM3 (ref 0–0.4)
EOSINOPHIL NFR BLD AUTO: 1.6 % (ref 0.3–6.2)
ERYTHROCYTE [DISTWIDTH] IN BLOOD BY AUTOMATED COUNT: 13.4 % (ref 12.3–15.4)
GEN 5 1HR TROPONIN T REFLEX: 13 NG/L
GLOBULIN UR ELPH-MCNC: 2.7 GM/DL
GLUCOSE SERPL-MCNC: 150 MG/DL (ref 65–99)
HBA1C MFR BLD: 6.33 % (ref 4.8–5.6)
HCT VFR BLD AUTO: 39.8 % (ref 34–46.6)
HGB BLD-MCNC: 12.7 G/DL (ref 12–15.9)
HOLD SPECIMEN: NORMAL
IMM GRANULOCYTES # BLD AUTO: 0.04 10*3/MM3 (ref 0–0.05)
IMM GRANULOCYTES NFR BLD AUTO: 0.4 % (ref 0–0.5)
LYMPHOCYTES # BLD AUTO: 1.93 10*3/MM3 (ref 0.7–3.1)
LYMPHOCYTES NFR BLD AUTO: 19 % (ref 19.6–45.3)
MAGNESIUM SERPL-MCNC: 1.8 MG/DL (ref 1.6–2.4)
MCH RBC QN AUTO: 29.3 PG (ref 26.6–33)
MCHC RBC AUTO-ENTMCNC: 31.9 G/DL (ref 31.5–35.7)
MCV RBC AUTO: 91.9 FL (ref 79–97)
MONOCYTES # BLD AUTO: 0.44 10*3/MM3 (ref 0.1–0.9)
MONOCYTES NFR BLD AUTO: 4.3 % (ref 5–12)
NEUTROPHILS NFR BLD AUTO: 7.58 10*3/MM3 (ref 1.7–7)
NEUTROPHILS NFR BLD AUTO: 74.5 % (ref 42.7–76)
NRBC BLD AUTO-RTO: 0 /100 WBC (ref 0–0.2)
NT-PROBNP SERPL-MCNC: 214 PG/ML (ref 0–900)
PLATELET # BLD AUTO: 283 10*3/MM3 (ref 140–450)
PMV BLD AUTO: 9.4 FL (ref 6–12)
POTASSIUM SERPL-SCNC: 4.9 MMOL/L (ref 3.5–5.2)
PROT SERPL-MCNC: 7.1 G/DL (ref 6–8.5)
RBC # BLD AUTO: 4.33 10*6/MM3 (ref 3.77–5.28)
SODIUM SERPL-SCNC: 139 MMOL/L (ref 136–145)
TROPONIN T NUMERIC DELTA: 1 NG/L
TROPONIN T SERPL HS-MCNC: 12 NG/L
TSH SERPL DL<=0.05 MIU/L-ACNC: 1.71 UIU/ML (ref 0.27–4.2)
WBC NRBC COR # BLD AUTO: 10.17 10*3/MM3 (ref 3.4–10.8)
WHOLE BLOOD HOLD COAG: NORMAL

## 2025-06-20 PROCEDURE — 80053 COMPREHEN METABOLIC PANEL: CPT | Performed by: EMERGENCY MEDICINE

## 2025-06-20 PROCEDURE — 82375 ASSAY CARBOXYHB QUANT: CPT | Performed by: EMERGENCY MEDICINE

## 2025-06-20 PROCEDURE — 96374 THER/PROPH/DIAG INJ IV PUSH: CPT

## 2025-06-20 PROCEDURE — 96376 TX/PRO/DX INJ SAME DRUG ADON: CPT

## 2025-06-20 PROCEDURE — 36415 COLL VENOUS BLD VENIPUNCTURE: CPT

## 2025-06-20 PROCEDURE — 85025 COMPLETE CBC W/AUTO DIFF WBC: CPT | Performed by: EMERGENCY MEDICINE

## 2025-06-20 PROCEDURE — 25810000003 LACTATED RINGERS SOLUTION: Performed by: EMERGENCY MEDICINE

## 2025-06-20 PROCEDURE — 25010000002 ONDANSETRON PER 1 MG: Performed by: EMERGENCY MEDICINE

## 2025-06-20 PROCEDURE — G0378 HOSPITAL OBSERVATION PER HR: HCPCS

## 2025-06-20 PROCEDURE — 83880 ASSAY OF NATRIURETIC PEPTIDE: CPT | Performed by: NURSE PRACTITIONER

## 2025-06-20 PROCEDURE — 84443 ASSAY THYROID STIM HORMONE: CPT | Performed by: EMERGENCY MEDICINE

## 2025-06-20 PROCEDURE — 70551 MRI BRAIN STEM W/O DYE: CPT

## 2025-06-20 PROCEDURE — 83036 HEMOGLOBIN GLYCOSYLATED A1C: CPT | Performed by: NURSE PRACTITIONER

## 2025-06-20 PROCEDURE — 71045 X-RAY EXAM CHEST 1 VIEW: CPT

## 2025-06-20 PROCEDURE — 93005 ELECTROCARDIOGRAM TRACING: CPT | Performed by: EMERGENCY MEDICINE

## 2025-06-20 PROCEDURE — 99285 EMERGENCY DEPT VISIT HI MDM: CPT

## 2025-06-20 PROCEDURE — 83735 ASSAY OF MAGNESIUM: CPT | Performed by: EMERGENCY MEDICINE

## 2025-06-20 PROCEDURE — 84484 ASSAY OF TROPONIN QUANT: CPT | Performed by: EMERGENCY MEDICINE

## 2025-06-20 PROCEDURE — 25010000002 ONDANSETRON PER 1 MG: Performed by: NURSE PRACTITIONER

## 2025-06-20 RX ORDER — BISACODYL 5 MG/1
5 TABLET, DELAYED RELEASE ORAL DAILY PRN
Status: DISCONTINUED | OUTPATIENT
Start: 2025-06-20 | End: 2025-06-21 | Stop reason: HOSPADM

## 2025-06-20 RX ORDER — GUAIFENESIN 600 MG/1
600 TABLET, EXTENDED RELEASE ORAL EVERY 12 HOURS SCHEDULED
Status: DISCONTINUED | OUTPATIENT
Start: 2025-06-20 | End: 2025-06-21 | Stop reason: HOSPADM

## 2025-06-20 RX ORDER — NITROGLYCERIN 0.4 MG/1
0.4 TABLET SUBLINGUAL
Status: DISCONTINUED | OUTPATIENT
Start: 2025-06-20 | End: 2025-06-21 | Stop reason: HOSPADM

## 2025-06-20 RX ORDER — TRIAMTERENE AND HYDROCHLOROTHIAZIDE 75; 50 MG/1; MG/1
1 TABLET ORAL DAILY
COMMUNITY

## 2025-06-20 RX ORDER — ATORVASTATIN CALCIUM 10 MG/1
10 TABLET, FILM COATED ORAL DAILY
Status: DISCONTINUED | OUTPATIENT
Start: 2025-06-20 | End: 2025-06-21 | Stop reason: HOSPADM

## 2025-06-20 RX ORDER — CETIRIZINE HYDROCHLORIDE 10 MG/1
10 TABLET ORAL DAILY
Status: DISCONTINUED | OUTPATIENT
Start: 2025-06-20 | End: 2025-06-21 | Stop reason: HOSPADM

## 2025-06-20 RX ORDER — ISOSORBIDE MONONITRATE 30 MG/1
30 TABLET, EXTENDED RELEASE ORAL
Status: DISCONTINUED | OUTPATIENT
Start: 2025-06-20 | End: 2025-06-21 | Stop reason: HOSPADM

## 2025-06-20 RX ORDER — MECLIZINE HYDROCHLORIDE 25 MG/1
25 TABLET ORAL ONCE
Status: COMPLETED | OUTPATIENT
Start: 2025-06-20 | End: 2025-06-20

## 2025-06-20 RX ORDER — ONDANSETRON 2 MG/ML
8 INJECTION INTRAMUSCULAR; INTRAVENOUS ONCE
Status: COMPLETED | OUTPATIENT
Start: 2025-06-20 | End: 2025-06-20

## 2025-06-20 RX ORDER — ACETAMINOPHEN 160 MG/5ML
650 SOLUTION ORAL EVERY 4 HOURS PRN
Status: DISCONTINUED | OUTPATIENT
Start: 2025-06-20 | End: 2025-06-21 | Stop reason: HOSPADM

## 2025-06-20 RX ORDER — SODIUM CHLORIDE 0.9 % (FLUSH) 0.9 %
10 SYRINGE (ML) INJECTION AS NEEDED
Status: DISCONTINUED | OUTPATIENT
Start: 2025-06-20 | End: 2025-06-21 | Stop reason: HOSPADM

## 2025-06-20 RX ORDER — FERROUS SULFATE 325(65) MG
325 TABLET ORAL
Status: DISCONTINUED | OUTPATIENT
Start: 2025-06-21 | End: 2025-06-21 | Stop reason: HOSPADM

## 2025-06-20 RX ORDER — ACETAMINOPHEN 650 MG/1
650 SUPPOSITORY RECTAL EVERY 4 HOURS PRN
Status: DISCONTINUED | OUTPATIENT
Start: 2025-06-20 | End: 2025-06-21 | Stop reason: HOSPADM

## 2025-06-20 RX ORDER — PRAVASTATIN SODIUM 10 MG
10 TABLET ORAL DAILY
COMMUNITY

## 2025-06-20 RX ORDER — ACETAMINOPHEN 325 MG/1
650 TABLET ORAL EVERY 4 HOURS PRN
Status: DISCONTINUED | OUTPATIENT
Start: 2025-06-20 | End: 2025-06-21 | Stop reason: HOSPADM

## 2025-06-20 RX ORDER — ASPIRIN 81 MG/1
324 TABLET, CHEWABLE ORAL ONCE
Status: DISCONTINUED | OUTPATIENT
Start: 2025-06-20 | End: 2025-06-20

## 2025-06-20 RX ORDER — CELECOXIB 200 MG/1
200 CAPSULE ORAL DAILY
COMMUNITY

## 2025-06-20 RX ORDER — ASPIRIN 81 MG/1
81 TABLET ORAL DAILY
Status: DISCONTINUED | OUTPATIENT
Start: 2025-06-21 | End: 2025-06-21 | Stop reason: HOSPADM

## 2025-06-20 RX ORDER — ESCITALOPRAM OXALATE 10 MG/1
10 TABLET ORAL DAILY
Status: DISCONTINUED | OUTPATIENT
Start: 2025-06-20 | End: 2025-06-21 | Stop reason: HOSPADM

## 2025-06-20 RX ORDER — SODIUM CHLORIDE 0.9 % (FLUSH) 0.9 %
10 SYRINGE (ML) INJECTION EVERY 12 HOURS SCHEDULED
Status: DISCONTINUED | OUTPATIENT
Start: 2025-06-20 | End: 2025-06-21 | Stop reason: HOSPADM

## 2025-06-20 RX ORDER — LORAZEPAM 1 MG/1
1 TABLET ORAL ONCE
Status: COMPLETED | OUTPATIENT
Start: 2025-06-20 | End: 2025-06-20

## 2025-06-20 RX ORDER — HYDROXYZINE HYDROCHLORIDE 25 MG/1
25 TABLET, FILM COATED ORAL NIGHTLY PRN
Status: DISCONTINUED | OUTPATIENT
Start: 2025-06-20 | End: 2025-06-21 | Stop reason: HOSPADM

## 2025-06-20 RX ORDER — ROPINIROLE 1 MG/1
1 TABLET, FILM COATED ORAL NIGHTLY
Status: DISCONTINUED | OUTPATIENT
Start: 2025-06-20 | End: 2025-06-21 | Stop reason: HOSPADM

## 2025-06-20 RX ORDER — SODIUM CHLORIDE 9 MG/ML
40 INJECTION, SOLUTION INTRAVENOUS AS NEEDED
Status: DISCONTINUED | OUTPATIENT
Start: 2025-06-20 | End: 2025-06-21 | Stop reason: HOSPADM

## 2025-06-20 RX ORDER — POLYETHYLENE GLYCOL 3350 17 G/17G
17 POWDER, FOR SOLUTION ORAL DAILY PRN
Status: DISCONTINUED | OUTPATIENT
Start: 2025-06-20 | End: 2025-06-21 | Stop reason: HOSPADM

## 2025-06-20 RX ORDER — AMOXICILLIN 250 MG
2 CAPSULE ORAL 2 TIMES DAILY PRN
Status: DISCONTINUED | OUTPATIENT
Start: 2025-06-20 | End: 2025-06-21 | Stop reason: HOSPADM

## 2025-06-20 RX ORDER — ESCITALOPRAM OXALATE 10 MG/1
10 TABLET ORAL DAILY
COMMUNITY

## 2025-06-20 RX ORDER — TRIAMTERENE AND HYDROCHLOROTHIAZIDE 75; 50 MG/1; MG/1
1 TABLET ORAL DAILY
Status: DISCONTINUED | OUTPATIENT
Start: 2025-06-20 | End: 2025-06-21 | Stop reason: HOSPADM

## 2025-06-20 RX ORDER — ONDANSETRON 2 MG/ML
4 INJECTION INTRAMUSCULAR; INTRAVENOUS EVERY 6 HOURS PRN
Status: DISCONTINUED | OUTPATIENT
Start: 2025-06-20 | End: 2025-06-21 | Stop reason: HOSPADM

## 2025-06-20 RX ORDER — LOSARTAN POTASSIUM 50 MG/1
100 TABLET ORAL
Status: DISCONTINUED | OUTPATIENT
Start: 2025-06-20 | End: 2025-06-21 | Stop reason: HOSPADM

## 2025-06-20 RX ORDER — BISACODYL 10 MG
10 SUPPOSITORY, RECTAL RECTAL DAILY PRN
Status: DISCONTINUED | OUTPATIENT
Start: 2025-06-20 | End: 2025-06-21 | Stop reason: HOSPADM

## 2025-06-20 RX ORDER — AMLODIPINE BESYLATE 5 MG/1
5 TABLET ORAL
Status: DISCONTINUED | OUTPATIENT
Start: 2025-06-20 | End: 2025-06-21 | Stop reason: HOSPADM

## 2025-06-20 RX ORDER — ONDANSETRON 4 MG/1
4 TABLET, ORALLY DISINTEGRATING ORAL EVERY 6 HOURS PRN
Status: DISCONTINUED | OUTPATIENT
Start: 2025-06-20 | End: 2025-06-21 | Stop reason: HOSPADM

## 2025-06-20 RX ORDER — PANTOPRAZOLE SODIUM 40 MG/1
40 TABLET, DELAYED RELEASE ORAL DAILY
Status: DISCONTINUED | OUTPATIENT
Start: 2025-06-21 | End: 2025-06-21 | Stop reason: HOSPADM

## 2025-06-20 RX ADMIN — CETIRIZINE HYDROCHLORIDE 10 MG: 10 TABLET, FILM COATED ORAL at 14:50

## 2025-06-20 RX ADMIN — ONDANSETRON 4 MG: 2 INJECTION, SOLUTION INTRAMUSCULAR; INTRAVENOUS at 22:25

## 2025-06-20 RX ADMIN — ONDANSETRON 8 MG: 2 INJECTION, SOLUTION INTRAMUSCULAR; INTRAVENOUS at 08:44

## 2025-06-20 RX ADMIN — LOSARTAN POTASSIUM 100 MG: 50 TABLET, FILM COATED ORAL at 14:50

## 2025-06-20 RX ADMIN — Medication 10 ML: at 20:59

## 2025-06-20 RX ADMIN — SODIUM CHLORIDE, SODIUM LACTATE, POTASSIUM CHLORIDE, CALCIUM CHLORIDE 500 ML: 20; 30; 600; 310 INJECTION, SOLUTION INTRAVENOUS at 08:44

## 2025-06-20 RX ADMIN — MECLIZINE HYDROCHLORIDE 25 MG: 25 TABLET ORAL at 09:47

## 2025-06-20 RX ADMIN — GUAIFENESIN 600 MG: 600 TABLET, EXTENDED RELEASE ORAL at 14:50

## 2025-06-20 RX ADMIN — AMLODIPINE BESYLATE 5 MG: 5 TABLET ORAL at 14:50

## 2025-06-20 RX ADMIN — ONDANSETRON 4 MG: 2 INJECTION, SOLUTION INTRAMUSCULAR; INTRAVENOUS at 16:03

## 2025-06-20 RX ADMIN — Medication 10 ML: at 14:51

## 2025-06-20 RX ADMIN — GUAIFENESIN 600 MG: 600 TABLET, EXTENDED RELEASE ORAL at 20:59

## 2025-06-20 RX ADMIN — LORAZEPAM 1 MG: 1 TABLET ORAL at 08:54

## 2025-06-20 RX ADMIN — ATORVASTATIN CALCIUM 10 MG: 10 TABLET ORAL at 14:50

## 2025-06-20 RX ADMIN — ROPINIROLE 1 MG: 1 TABLET, FILM COATED ORAL at 20:58

## 2025-06-20 RX ADMIN — TRIAMTERENE AND HYDROCHLOROTHIAZIDE 1 TABLET: 75; 50 TABLET ORAL at 14:50

## 2025-06-20 RX ADMIN — ESCITALOPRAM 10 MG: 10 TABLET, FILM COATED ORAL at 14:50

## 2025-06-20 NOTE — PLAN OF CARE
Goal Outcome Evaluation:     Problem: Adult Inpatient Plan of Care  Goal: Plan of Care Review  Outcome: Progressing  Goal: Patient-Specific Goal (Individualized)  Outcome: Progressing  Goal: Absence of Hospital-Acquired Illness or Injury  Outcome: Progressing  Intervention: Identify and Manage Fall Risk  Recent Flowsheet Documentation  Taken 6/20/2025 1400 by Amina Cunningham RN  Safety Promotion/Fall Prevention:   safety round/check completed   room organization consistent  Taken 6/20/2025 1338 by Amina Cunningham RN  Safety Promotion/Fall Prevention:   safety round/check completed   room organization consistent   clutter free environment maintained  Intervention: Prevent Skin Injury  Recent Flowsheet Documentation  Taken 6/20/2025 1338 by Amina Cunningham RN  Body Position: position changed independently  Intervention: Prevent Infection  Recent Flowsheet Documentation  Taken 6/20/2025 1400 by Amina Cunningham RN  Infection Prevention:   hand hygiene promoted   rest/sleep promoted   personal protective equipment utilized  Taken 6/20/2025 1338 by Amina Cunningham RN  Infection Prevention:   hand hygiene promoted   personal protective equipment utilized   rest/sleep promoted  Goal: Optimal Comfort and Wellbeing  Outcome: Progressing  Intervention: Provide Person-Centered Care  Recent Flowsheet Documentation  Taken 6/20/2025 1338 by Amina Cunningham RN  Trust Relationship/Rapport:   care explained   questions answered   thoughts/feelings acknowledged  Goal: Readiness for Transition of Care  Outcome: Progressing  Intervention: Mutually Develop Transition Plan  Recent Flowsheet Documentation  Taken 6/20/2025 1355 by Amina Cunningham RN  Transportation Anticipated: family or friend will provide  Patient/Family Anticipated Services at Transition: none  Patient/Family Anticipates Transition to: home  Taken 6/20/2025 1354 by Amina Cunningham RN  Equipment Currently Used at Home: none     Problem: Comorbidity Management  Goal: Blood Pressure in  Desired Range  Outcome: Progressing

## 2025-06-20 NOTE — ED PROVIDER NOTES
Subjective   History of Present Illness  Chief complaint dizziness feels generally bad weakness sweaty    History of present illness 70-year-old female who states that Tuesday she started getting dizzy she states when she got up out of bed the room started spinning.  She got nauseated.  She states that it has been kind of coming and going since that time seems to be somewhat related to position when she moves her head around.  There is no headache with that there was no visual changes there was no speech difficulty there was no weakness to one-sided the other.  Nothing otherwise seem to trigger it other when she got up out of bed.  She had a little bit of chest pressure at that time but none since that time.  She states has been persistent today since getting up.  She denies any neck arm or jaw pain back pain or antibiotic use.  No leg pain or swelling no recent long car ride plane ride immobilization or foreign travels or ill exposures.      Review of Systems   Constitutional:  Negative for chills and fever.   Respiratory:  Positive for chest tightness and shortness of breath.    Cardiovascular:  Negative for chest pain, palpitations and leg swelling.   Gastrointestinal:  Positive for nausea and vomiting. Negative for blood in stool.   Genitourinary:  Negative for difficulty urinating and dysuria.   Musculoskeletal:  Negative for back pain and neck pain.   Skin:  Negative for rash.   Neurological:  Positive for dizziness and light-headedness. Negative for facial asymmetry, speech difficulty and headaches.   Psychiatric/Behavioral:  Negative for confusion.        Past Medical History:   Diagnosis Date    Anxiety     Asthma 04/27/2016    Hyperlipidemia 03/02/2020    Hypertension 03/02/2020    Obesity (BMI 30-39.9) 03/02/2020    Osteopenia 03/02/2020    Sleep apnea     SOBOE (shortness of breath on exertion)        Allergies   Allergen Reactions    Prednisone Rash    Atorvastatin Itching    Lisinopril Cough     "Azithromycin Rash     Re-entered from allergy \"Z-Pack\"       Past Surgical History:   Procedure Laterality Date    CARDIAC CATHETERIZATION N/A 03/04/2020    Procedure: Left Heart Cath and coronary angiogram;  Surgeon: Hien Camacho MD;  Location: [facility] CATH INVASIVE LOCATION;  Service: Cardiovascular;  Laterality: N/A;    CARDIAC CATHETERIZATION N/A 03/04/2020    Procedure: Left ventriculography;  Surgeon: Hien Camacho MD;  Location: [facility] CATH INVASIVE LOCATION;  Service: Cardiovascular;  Laterality: N/A;    CARDIAC CATHETERIZATION N/A 01/30/2023    Procedure: Left Heart Cath and coronary angiogram;  Surgeon: Hien Camacho MD;  Location: [facility] CATH INVASIVE LOCATION;  Service: Cardiovascular;  Laterality: N/A;    CARDIAC CATHETERIZATION N/A 01/30/2023    Procedure: Right Heart Cath;  Surgeon: Hien Camacho MD;  Location: [facility] CATH INVASIVE LOCATION;  Service: Cardiovascular;  Laterality: N/A;    CHOLECYSTECTOMY WITH INTRAOPERATIVE CHOLANGIOGRAM N/A 2/6/2024    Procedure: CHOLECYSTECTOMY LAPAROSCOPIC INTRAOPERATIVE CHOLANGIOGRAM liver biopsy;  Surgeon: Tom Hernandez MD;  Location: [facility] MAIN OR;  Service: General;  Laterality: N/A;    COLONOSCOPY N/A 8/23/2024    Procedure: COLONOSCOPY with POLYPECTOMY X2 (COLD SNARE);  Surgeon: Bismark Cartagena MD;  Location: [facility] ENDOSCOPY;  Service: Gastroenterology;  Laterality: N/A;  POST; COLON POLYPS    ENDOSCOPY N/A 8/23/2024    Procedure: ESOPHAGOGASTRODUODENOSCOPY with BIOPSY;  Surgeon: Bismark Cartagena MD;  Location: [facility] ENDOSCOPY;  Service: Gastroenterology;  Laterality: N/A;    HYSTERECTOMY         Family History   Problem Relation Age of Onset    Hypertension Mother     Heart disease Sister     Heart disease Brother     Colon cancer Maternal Grandmother        Social History     Socioeconomic History    Marital status:    Tobacco Use    Smoking status: Former     Current packs/day: 1.50     Average packs/day: 1.5 " packs/day for 32.0 years (48.0 ttl pk-yrs)     Types: Cigarettes     Passive exposure: Past    Smokeless tobacco: Never   Vaping Use    Vaping status: Never Used   Substance and Sexual Activity    Alcohol use: Never    Drug use: Never    Sexual activity: Defer     Prior to Admission medications    Medication Sig Start Date End Date Taking? Authorizing Provider   albuterol sulfate  (90 Base) MCG/ACT inhaler Inhale 2 puffs Every 6 (Six) Hours As Needed. 6/23/22   Lorraine Goyal MD   amLODIPine (NORVASC) 5 MG tablet Take 1 tablet by mouth Daily. 11/27/24   Christi Ramires APRN   aspirin 81 MG EC tablet Take 1 tablet by mouth Daily.    Lorraine Goyal MD   benzonatate (Tessalon Perles) 100 MG capsule Take 1 capsule by mouth 3 (Three) Times a Day As Needed for Cough. 11/17/23   Antoinette Bernstein APRN   calcium carbonate (OS-JEANA) 600 MG tablet Take 1 tablet by mouth Daily.    Lorraine Goyal MD   Cholecalciferol 50 MCG (2000 UT) capsule Take 2,000 Int'l Units by mouth Daily. 12/5/20   Lorraine Goyal MD   ferrous sulfate 325 (65 FE) MG tablet Take 1 tablet by mouth Daily With Breakfast.    Lorraine Goyal MD   gabapentin (NEURONTIN) 100 MG capsule Take 1 capsule by mouth Every Night. 11/30/23   Antoinette Bernstein APRN   hydroCHLOROthiazide (HYDRODIURIL) 50 MG tablet Take 1 tablet by mouth Daily. 7/6/22   Lorraine Goyal MD   HYDROcodone-acetaminophen (NORCO) 7.5-325 MG per tablet Take 1 tablet by mouth Every 6 (Six) Hours As Needed for Moderate Pain. 2/7/24   Mike Tom DO   hydrOXYzine (ATARAX) 25 MG tablet Take 1 tablet by mouth At Night As Needed for Itching.    Lorraine Goyal MD   isosorbide mononitrate (IMDUR) 30 MG 24 hr tablet Take 1 tablet by mouth Daily. 11/27/24   Christi Ramires APRN   losartan (COZAAR) 100 MG tablet Take 1 tablet by mouth Daily. 11/27/24   Christi Ramires APRN   pantoprazole (PROTONIX) 40 MG EC tablet Take 1 tablet by mouth  Every Morning. 11/27/24   Christi Ramires APRN   Potassium 99 MG tablet Take 1 tablet by mouth 2 (Two) Times a Day.    ProviderLorraine MD   Probiotic Product (Probiotic Blend) capsule Take 1 capsule by mouth Daily. 11/17/23   Antoinette Bernstein APRN   rOPINIRole (REQUIP) 0.5 MG tablet Take 2 tablets by mouth Every Night. Take 1 hour before bedtime.    ProviderLorraine MD   triamcinolone (KENALOG) 0.1 % cream Apply 1 Application topically to the appropriate area as directed 2 (Two) Times a Day.    Provider, MD Lorraine   zinc gluconate 50 MG tablet Take 1 tablet by mouth Daily. 12/5/20   Lorraine Goyal MD          Objective   Physical Exam  Constitutional 70-year-old female awake alert no acute distress triage vital signs have been reviewed.  HEENT extraocular muscles are intact pupils equal round react there is no photophobia or papilledema TMs are clear mouth is clear neck is supple no adenopathy no JV no bruits lungs clear no retraction no use of accessories heart regular without murmur rub abdomen was soft nontender good bowel sounds no peritoneal findings or pulsatile masses extremities pulses equal upper and lower extremities no edema no cords or Homans' sign no evidence of DVT skin is warm dry without rashes neurologic awake alert orientated x 4 no facial asymmetry she has unidirectional horizontal nystagmus.  Tongue is soft palate normal no drift the arms or legs normal finger-to-nose toes downgoing no clonus.  Peripheral vision intact there is no extinction.  Shoulder shrug normal.  Hearing normal.  She sits up without difficulty no truncal ataxia.  The patient has unidirectional horizontal nystagmus the patient has movement with head impulse no movement with test of skew.  Procedures           ED Course      Results for orders placed or performed during the hospital encounter of 06/20/25   ECG 12 Lead Dyspnea    Collection Time: 06/20/25  8:17 AM   Result Value Ref Range    QT  Interval 404 ms    QTC Interval 390 ms   Comprehensive Metabolic Panel    Collection Time: 06/20/25  8:39 AM    Specimen: Blood   Result Value Ref Range    Glucose 150 (H) 65 - 99 mg/dL    BUN 23.0 8.0 - 23.0 mg/dL    Creatinine 1.02 (H) 0.57 - 1.00 mg/dL    Sodium 139 136 - 145 mmol/L    Potassium 4.9 3.5 - 5.2 mmol/L    Chloride 102 98 - 107 mmol/L    CO2 23.8 22.0 - 29.0 mmol/L    Calcium 10.1 8.6 - 10.5 mg/dL    Total Protein 7.1 6.0 - 8.5 g/dL    Albumin 4.4 3.5 - 5.2 g/dL    ALT (SGPT) 21 1 - 33 U/L    AST (SGOT) 32 1 - 32 U/L    Alkaline Phosphatase 93 39 - 117 U/L    Total Bilirubin 0.5 0.0 - 1.2 mg/dL    Globulin 2.7 gm/dL    A/G Ratio 1.6 g/dL    BUN/Creatinine Ratio 22.5 7.0 - 25.0    Anion Gap 13.2 5.0 - 15.0 mmol/L    eGFR 59.7 (L) >60.0 mL/min/1.73   High Sensitivity Troponin T    Collection Time: 06/20/25  8:39 AM    Specimen: Blood   Result Value Ref Range    HS Troponin T 12 <14 ng/L   TSH Rfx On Abnormal To Free T4    Collection Time: 06/20/25  8:39 AM    Specimen: Blood   Result Value Ref Range    TSH 1.710 0.270 - 4.200 uIU/mL   Magnesium    Collection Time: 06/20/25  8:39 AM    Specimen: Blood   Result Value Ref Range    Magnesium 1.8 1.6 - 2.4 mg/dL   CBC Auto Differential    Collection Time: 06/20/25  8:39 AM    Specimen: Blood   Result Value Ref Range    WBC 10.17 3.40 - 10.80 10*3/mm3    RBC 4.33 3.77 - 5.28 10*6/mm3    Hemoglobin 12.7 12.0 - 15.9 g/dL    Hematocrit 39.8 34.0 - 46.6 %    MCV 91.9 79.0 - 97.0 fL    MCH 29.3 26.6 - 33.0 pg    MCHC 31.9 31.5 - 35.7 g/dL    RDW 13.4 12.3 - 15.4 %    RDW-SD 45.5 37.0 - 54.0 fl    MPV 9.4 6.0 - 12.0 fL    Platelets 283 140 - 450 10*3/mm3    Neutrophil % 74.5 42.7 - 76.0 %    Lymphocyte % 19.0 (L) 19.6 - 45.3 %    Monocyte % 4.3 (L) 5.0 - 12.0 %    Eosinophil % 1.6 0.3 - 6.2 %    Basophil % 0.2 0.0 - 1.5 %    Immature Grans % 0.4 0.0 - 0.5 %    Neutrophils, Absolute 7.58 (H) 1.70 - 7.00 10*3/mm3    Lymphocytes, Absolute 1.93 0.70 - 3.10 10*3/mm3     Monocytes, Absolute 0.44 0.10 - 0.90 10*3/mm3    Eosinophils, Absolute 0.16 0.00 - 0.40 10*3/mm3    Basophils, Absolute 0.02 0.00 - 0.20 10*3/mm3    Immature Grans, Absolute 0.04 0.00 - 0.05 10*3/mm3    nRBC 0.0 0.0 - 0.2 /100 WBC   Carbon Monoxide, Blood    Collection Time: 06/20/25  8:39 AM    Specimen: Blood   Result Value Ref Range    Carbon Monoxide, Blood 2.2 0 - 3 %   Hemoglobin A1c    Collection Time: 06/20/25  8:39 AM    Specimen: Blood   Result Value Ref Range    Hemoglobin A1C 6.33 (H) 4.80 - 5.60 %   Gold Top - SST    Collection Time: 06/20/25  8:39 AM   Result Value Ref Range    Extra Tube Hold for add-ons.    Light Blue Top    Collection Time: 06/20/25  8:39 AM   Result Value Ref Range    Extra Tube Hold for add-ons.    High Sensitivity Troponin T 1Hr    Collection Time: 06/20/25 10:10 AM    Specimen: Arm, Left; Blood   Result Value Ref Range    HS Troponin T 13 <14 ng/L    Troponin T Numeric Delta 1 Abnormal if >/=3 ng/L   BNP    Collection Time: 06/20/25 10:10 AM    Specimen: Arm, Left; Blood   Result Value Ref Range    proBNP 214.0 0.0 - 900.0 pg/mL     MRI Brain Without Contrast  Result Date: 6/20/2025  Impression: 1. No acute ischemic change 2. Mild white matter changes compatible small vessel ischemic disease in this age. 3. Mild bilateral mastoid effusions Electronically Signed: Darren Wright MD  6/20/2025 9:44 AM EDT  Workstation ID: [id number]    XR Chest 1 View  Result Date: 6/20/2025  Impression: No acute cardiopulmonary disease. Electronically Signed: Anthony Alston MD  6/20/2025 9:02 AM EDT  Workstation ID: [id number]    Medications   sodium chloride 0.9 % flush 10 mL (has no administration in time range)   aspirin EC tablet 81 mg (has no administration in time range)   amLODIPine (NORVASC) tablet 5 mg (5 mg Oral Given 6/20/25 1450)   escitalopram (LEXAPRO) tablet 10 mg (10 mg Oral Given 6/20/25 1450)   ferrous sulfate tablet 325 mg (has no administration in time range)   hydrOXYzine  (ATARAX) tablet 25 mg (has no administration in time range)   isosorbide mononitrate (IMDUR) 24 hr tablet 30 mg ( Oral Dose Auto Held 6/28/25 0900)   losartan (COZAAR) tablet 100 mg (100 mg Oral Given 6/20/25 1450)   pantoprazole (PROTONIX) EC tablet 40 mg (has no administration in time range)   atorvastatin (LIPITOR) tablet 10 mg (10 mg Oral Given 6/20/25 1450)   rOPINIRole (REQUIP) tablet 1 mg (has no administration in time range)   triamterene-hydrochlorothiazide (MAXZIDE) 75-50 MG per tablet 1 tablet (1 tablet Oral Given 6/20/25 1450)   sodium chloride 0.9 % flush 10 mL (10 mL Intravenous Given 6/20/25 1451)   sodium chloride 0.9 % flush 10 mL (has no administration in time range)   sodium chloride 0.9 % infusion 40 mL (has no administration in time range)   nitroglycerin (NITROSTAT) SL tablet 0.4 mg (has no administration in time range)   sennosides-docusate (PERICOLACE) 8.6-50 MG per tablet 2 tablet (has no administration in time range)     And   polyethylene glycol (MIRALAX) packet 17 g (has no administration in time range)     And   bisacodyl (DULCOLAX) EC tablet 5 mg (has no administration in time range)     And   bisacodyl (DULCOLAX) suppository 10 mg (has no administration in time range)   acetaminophen (TYLENOL) tablet 650 mg (has no administration in time range)     Or   acetaminophen (TYLENOL) 160 MG/5ML oral solution 650 mg (has no administration in time range)     Or   acetaminophen (TYLENOL) suppository 650 mg (has no administration in time range)   ondansetron ODT (ZOFRAN-ODT) disintegrating tablet 4 mg ( Oral Not Given:  See Alt 6/20/25 1603)     Or   ondansetron (ZOFRAN) injection 4 mg (4 mg Intravenous Given 6/20/25 1603)   cetirizine (zyrTEC) tablet 10 mg (10 mg Oral Given 6/20/25 1450)   guaiFENesin (MUCINEX) 12 hr tablet 600 mg (600 mg Oral Given 6/20/25 1450)   lactated ringers bolus 500 mL (0 mL Intravenous Stopped 6/20/25 1035)   meclizine (ANTIVERT) tablet 25 mg (25 mg Oral Given 6/20/25  0921)   ondansetron (ZOFRAN) injection 8 mg (8 mg Intravenous Given 6/20/25 0809)   LORazepam (ATIVAN) tablet 1 mg (1 mg Oral Given 6/20/25 0854)                                 Total (NIH Stroke Scale): 0          EKG my interpretation normal sinus rhythm rate of 60 normal axis no hypertrophy QTc 390 patient has some Q waves noted in V1 unchanged from a previous EKG on 11/25/2025 abnormal            Medical Decision Making  Medical Decision Making.  IV established monitor placed my review of sinus rhythm.  EKG my interpretation normal sinus rhythm rate of 60 normal axis hypertrophy QTc 398 Q waves noted in V1 and unchanged from previous on 11/25/2025.  Chest x-ray my independent review no pneumonia pneumothorax failure acute findings.  Radiology review unremarkable MRI of the brain obtained I do not see any evidence on my independent review of an acute stroke or hemorrhage or tumors radiology shows some chronic white matter changes but nothing acute some bilateral mastoid effusions.  Patient here was given 1 L lactated Ringer's meclizine 25 mg p.o. Zofran 8 mg IV and Ativan 1 mg p.o. for MRI.  Labs obtained by independent review comprehensive metabolic profile unremarkable blood sugar 150 CBC magnesium TSH within normal limit and troponins unremarkable.  proBNP normal all these labs independent reviewed by me.  Patient repeat examinations resting comfortably although she is feeling better she is not quite as dizzy but when she gets up and tries to move she becomes very dizzy.  There is no mastoid redness or tenderness anywhere.  She was made aware of the findings I do not see any evidence to suggest acute mastoiditis or otitis media I do not see any evidence that suggest acute stroke meningitis encephalitis increased DVT increased ICP sepsis meningitis encephalitis acute myocardial infarction DVT pulmonary embolism or dissection based on my history and physical and clinical findings although not a complete list of  all possibilities.  She be placed in observation for supportive care physical therapy consultation and further workup stable unremarkable ER course.  Improved    Problems Addressed:  Chest pain, unspecified type: complicated acute illness or injury  Severe vertigo: complicated acute illness or injury    Amount and/or Complexity of Data Reviewed  Labs: ordered. Decision-making details documented in ED Course.  Radiology: ordered and independent interpretation performed. Decision-making details documented in ED Course.  ECG/medicine tests: ordered and independent interpretation performed. Decision-making details documented in ED Course.    Risk  OTC drugs.  Prescription drug management.  Decision regarding hospitalization.        Final diagnoses:   Severe vertigo   Chest pain, unspecified type       ED Disposition  ED Disposition       ED Disposition   Decision to Admit    Condition   --    Comment   --               No follow-up provider specified.       Medication List      No changes were made to your prescriptions during this visit.            Garcia Tom MD  06/20/25 1154

## 2025-06-21 ENCOUNTER — APPOINTMENT (OUTPATIENT)
Dept: NUCLEAR MEDICINE | Facility: HOSPITAL | Age: 70
End: 2025-06-21
Payer: MEDICARE

## 2025-06-21 VITALS
HEART RATE: 60 BPM | SYSTOLIC BLOOD PRESSURE: 139 MMHG | RESPIRATION RATE: 17 BRPM | HEIGHT: 62 IN | BODY MASS INDEX: 39.38 KG/M2 | WEIGHT: 214 LBS | OXYGEN SATURATION: 97 % | TEMPERATURE: 98.8 F | DIASTOLIC BLOOD PRESSURE: 64 MMHG

## 2025-06-21 LAB
ANION GAP SERPL CALCULATED.3IONS-SCNC: 12 MMOL/L (ref 5–15)
BASOPHILS # BLD AUTO: 0.03 10*3/MM3 (ref 0–0.2)
BASOPHILS NFR BLD AUTO: 0.3 % (ref 0–1.5)
BH CV NUCLEAR PRIOR STUDY: 3
BH CV REST NUCLEAR ISOTOPE DOSE: 11 MCI
BH CV STRESS BP STAGE 1: NORMAL
BH CV STRESS COMMENTS STAGE 1: NORMAL
BH CV STRESS DOSE REGADENOSON STAGE 1: 0.4
BH CV STRESS DURATION MIN STAGE 1: 0
BH CV STRESS DURATION SEC STAGE 1: 10
BH CV STRESS HR STAGE 1: 63
BH CV STRESS NUCLEAR ISOTOPE DOSE: 31.9 MCI
BH CV STRESS PROTOCOL 1: NORMAL
BH CV STRESS RECOVERY BP: NORMAL MMHG
BH CV STRESS RECOVERY HR: 70 BPM
BH CV STRESS STAGE 1: 1
BUN SERPL-MCNC: 27.2 MG/DL (ref 8–23)
BUN/CREAT SERPL: 25.2 (ref 7–25)
CALCIUM SPEC-SCNC: 9.7 MG/DL (ref 8.6–10.5)
CHLORIDE SERPL-SCNC: 101 MMOL/L (ref 98–107)
CO2 SERPL-SCNC: 26 MMOL/L (ref 22–29)
CREAT SERPL-MCNC: 1.08 MG/DL (ref 0.57–1)
DEPRECATED RDW RBC AUTO: 45.5 FL (ref 37–54)
EGFRCR SERPLBLD CKD-EPI 2021: 55.7 ML/MIN/1.73
EOSINOPHIL # BLD AUTO: 0.2 10*3/MM3 (ref 0–0.4)
EOSINOPHIL NFR BLD AUTO: 2 % (ref 0.3–6.2)
ERYTHROCYTE [DISTWIDTH] IN BLOOD BY AUTOMATED COUNT: 13.6 % (ref 12.3–15.4)
GLUCOSE SERPL-MCNC: 120 MG/DL (ref 65–99)
HCT VFR BLD AUTO: 38.9 % (ref 34–46.6)
HGB BLD-MCNC: 12.3 G/DL (ref 12–15.9)
IMM GRANULOCYTES # BLD AUTO: 0.03 10*3/MM3 (ref 0–0.05)
IMM GRANULOCYTES NFR BLD AUTO: 0.3 % (ref 0–0.5)
LYMPHOCYTES # BLD AUTO: 3.29 10*3/MM3 (ref 0.7–3.1)
LYMPHOCYTES NFR BLD AUTO: 32.4 % (ref 19.6–45.3)
MAXIMAL PREDICTED HEART RATE: 151 BPM
MCH RBC QN AUTO: 29 PG (ref 26.6–33)
MCHC RBC AUTO-ENTMCNC: 31.6 G/DL (ref 31.5–35.7)
MCV RBC AUTO: 91.7 FL (ref 79–97)
MONOCYTES # BLD AUTO: 0.62 10*3/MM3 (ref 0.1–0.9)
MONOCYTES NFR BLD AUTO: 6.1 % (ref 5–12)
NEUTROPHILS NFR BLD AUTO: 5.98 10*3/MM3 (ref 1.7–7)
NEUTROPHILS NFR BLD AUTO: 58.9 % (ref 42.7–76)
NRBC BLD AUTO-RTO: 0 /100 WBC (ref 0–0.2)
PERCENT MAX PREDICTED HR: 49.67 %
PLATELET # BLD AUTO: 304 10*3/MM3 (ref 140–450)
PMV BLD AUTO: 9.3 FL (ref 6–12)
POTASSIUM SERPL-SCNC: 4 MMOL/L (ref 3.5–5.2)
QT INTERVAL: 404 MS
QTC INTERVAL: 390 MS
RBC # BLD AUTO: 4.24 10*6/MM3 (ref 3.77–5.28)
SODIUM SERPL-SCNC: 139 MMOL/L (ref 136–145)
SPECT HRT GATED+EF W RNC IV: 65 %
STRESS BASELINE BP: NORMAL MMHG
STRESS BASELINE HR: 57 BPM
STRESS PERCENT HR: 58 %
STRESS POST PEAK BP: NORMAL MMHG
STRESS POST PEAK HR: 75 BPM
STRESS TARGET HR: 128 BPM
WBC NRBC COR # BLD AUTO: 10.15 10*3/MM3 (ref 3.4–10.8)

## 2025-06-21 PROCEDURE — 25010000002 REGADENOSON 0.4 MG/5ML SOLUTION: Performed by: EMERGENCY MEDICINE

## 2025-06-21 PROCEDURE — G0378 HOSPITAL OBSERVATION PER HR: HCPCS

## 2025-06-21 PROCEDURE — 93018 CV STRESS TEST I&R ONLY: CPT | Performed by: INTERNAL MEDICINE

## 2025-06-21 PROCEDURE — 97162 PT EVAL MOD COMPLEX 30 MIN: CPT

## 2025-06-21 PROCEDURE — 78452 HT MUSCLE IMAGE SPECT MULT: CPT

## 2025-06-21 PROCEDURE — 93017 CV STRESS TEST TRACING ONLY: CPT

## 2025-06-21 PROCEDURE — 80048 BASIC METABOLIC PNL TOTAL CA: CPT | Performed by: NURSE PRACTITIONER

## 2025-06-21 PROCEDURE — 78452 HT MUSCLE IMAGE SPECT MULT: CPT | Performed by: INTERNAL MEDICINE

## 2025-06-21 PROCEDURE — 93016 CV STRESS TEST SUPVJ ONLY: CPT | Performed by: NURSE PRACTITIONER

## 2025-06-21 PROCEDURE — 85025 COMPLETE CBC W/AUTO DIFF WBC: CPT | Performed by: NURSE PRACTITIONER

## 2025-06-21 PROCEDURE — A9502 TC99M TETROFOSMIN: HCPCS | Performed by: EMERGENCY MEDICINE

## 2025-06-21 PROCEDURE — 34310000005 TECHNETIUM TETROFOSMIN KIT: Performed by: EMERGENCY MEDICINE

## 2025-06-21 RX ORDER — CETIRIZINE HYDROCHLORIDE 10 MG/1
10 TABLET ORAL DAILY
Qty: 30 TABLET | Refills: 0 | Status: SHIPPED | OUTPATIENT
Start: 2025-06-22

## 2025-06-21 RX ORDER — ONDANSETRON 4 MG/1
4 TABLET, FILM COATED ORAL EVERY 8 HOURS PRN
Qty: 30 TABLET | Refills: 0 | Status: SHIPPED | OUTPATIENT
Start: 2025-06-21

## 2025-06-21 RX ORDER — REGADENOSON 0.08 MG/ML
0.4 INJECTION, SOLUTION INTRAVENOUS
Status: COMPLETED | OUTPATIENT
Start: 2025-06-21 | End: 2025-06-21

## 2025-06-21 RX ADMIN — FERROUS SULFATE TAB 325 MG (65 MG ELEMENTAL FE) 325 MG: 325 (65 FE) TAB at 12:50

## 2025-06-21 RX ADMIN — REGADENOSON 0.4 MG: 0.08 INJECTION, SOLUTION INTRAVENOUS at 10:15

## 2025-06-21 RX ADMIN — LOSARTAN POTASSIUM 100 MG: 50 TABLET, FILM COATED ORAL at 12:45

## 2025-06-21 RX ADMIN — GUAIFENESIN 600 MG: 600 TABLET, EXTENDED RELEASE ORAL at 12:45

## 2025-06-21 RX ADMIN — TETROFOSMIN 1 DOSE: 1.38 INJECTION, POWDER, LYOPHILIZED, FOR SOLUTION INTRAVENOUS at 08:00

## 2025-06-21 RX ADMIN — Medication 10 ML: at 08:19

## 2025-06-21 RX ADMIN — ESCITALOPRAM 10 MG: 10 TABLET, FILM COATED ORAL at 12:45

## 2025-06-21 RX ADMIN — CETIRIZINE HYDROCHLORIDE 10 MG: 10 TABLET, FILM COATED ORAL at 12:46

## 2025-06-21 RX ADMIN — ASPIRIN 81 MG: 81 TABLET, COATED ORAL at 12:46

## 2025-06-21 RX ADMIN — TETROFOSMIN 1 DOSE: 1.38 INJECTION, POWDER, LYOPHILIZED, FOR SOLUTION INTRAVENOUS at 10:15

## 2025-06-21 RX ADMIN — AMLODIPINE BESYLATE 5 MG: 5 TABLET ORAL at 12:46

## 2025-06-21 RX ADMIN — PANTOPRAZOLE SODIUM 40 MG: 40 TABLET, DELAYED RELEASE ORAL at 12:46

## 2025-06-21 NOTE — THERAPY EVALUATION
Patient Name: Scarlet Gong  : 1955    MRN: [id number]                              Today's Date: 2025       Admit Date: 2025    Visit Dx:     ICD-10-CM ICD-9-CM   1. Severe vertigo  R42 780.4   2. Chest pain, unspecified type  R07.9 786.50     Patient Active Problem List   Diagnosis    Chest pain    Asthma    Hyperlipidemia    Hypertension    Osteopenia    Obesity (BMI 30-39.9)    Abnormal nuclear stress test    Mixed hyperlipidemia    Essential hypertension    Other sleep apnea    Chest discomfort    Pancreatitis    Acute gallstone pancreatitis    Status post laparoscopic cholecystectomy    Severe vertigo     Past Medical History:   Diagnosis Date    Anxiety     Asthma 2016    Hyperlipidemia 2020    Hypertension 2020    Obesity (BMI 30-39.9) 2020    Osteopenia 2020    Sleep apnea     SOBOE (shortness of breath on exertion)      Past Surgical History:   Procedure Laterality Date    CARDIAC CATHETERIZATION N/A 2020    Procedure: Left Heart Cath and coronary angiogram;  Surgeon: Hien Camacho MD;  Location: [facility] CATH INVASIVE LOCATION;  Service: Cardiovascular;  Laterality: N/A;    CARDIAC CATHETERIZATION N/A 2020    Procedure: Left ventriculography;  Surgeon: Hien Camacho MD;  Location: [facility] CATH INVASIVE LOCATION;  Service: Cardiovascular;  Laterality: N/A;    CARDIAC CATHETERIZATION N/A 2023    Procedure: Left Heart Cath and coronary angiogram;  Surgeon: Hien Camacho MD;  Location: [facility] CATH INVASIVE LOCATION;  Service: Cardiovascular;  Laterality: N/A;    CARDIAC CATHETERIZATION N/A 2023    Procedure: Right Heart Cath;  Surgeon: Hien Camacho MD;  Location: [facility] CATH INVASIVE LOCATION;  Service: Cardiovascular;  Laterality: N/A;    CHOLECYSTECTOMY WITH INTRAOPERATIVE CHOLANGIOGRAM N/A 2024    Procedure: CHOLECYSTECTOMY LAPAROSCOPIC INTRAOPERATIVE CHOLANGIOGRAM liver biopsy;  Surgeon: Tom Hernandez MD;   Location: [facility] MAIN OR;  Service: General;  Laterality: N/A;    COLONOSCOPY N/A 8/23/2024    Procedure: COLONOSCOPY with POLYPECTOMY X2 (COLD SNARE);  Surgeon: Bismark Cartagena MD;  Location: [facility] ENDOSCOPY;  Service: Gastroenterology;  Laterality: N/A;  POST; COLON POLYPS    ENDOSCOPY N/A 8/23/2024    Procedure: ESOPHAGOGASTRODUODENOSCOPY with BIOPSY;  Surgeon: Bismark Cartagena MD;  Location: [facility] ENDOSCOPY;  Service: Gastroenterology;  Laterality: N/A;    HYSTERECTOMY        General Information       Row Name 06/21/25 1529          Physical Therapy Time and Intention    Document Type evaluation  -EL     Mode of Treatment individual therapy;physical therapy  -EL       Row Name 06/21/25 1529          General Information    Prior Level of Function independent:;all household mobility;ADL's;driving  -EL       Row Name 06/21/25 1529          Living Environment    Current Living Arrangements home  -EL     People in Home alone;other (see comments)  supportive neighbors that check in  -EL       Row Name 06/21/25 1529          Cognition    Orientation Status (Cognition) oriented x 4  -EL       Row Name 06/21/25 1529          Safety Issues/Impairments Affecting Functional Mobility    Impairments Affecting Function (Mobility) sensation/sensory awareness;other (see comments)  dizziness  -EL               User Key  (r) = Recorded By, (t) = Taken By, (c) = Cosigned By      Initials Name Provider Type    EL Marshall Jiménez PT Physical Therapist                   Mobility       Row Name 06/21/25 1534          Bed Mobility    Bed Mobility bed mobility (all) activities  -EL     All Activities, [place] (Bed Mobility) supervision  -EL       Row Name 06/21/25 1534          Sit-Stand Transfer    Sit-Stand [place] (Transfers) contact guard  -EL       Row Name 06/21/25 1534          Gait/Stairs (Locomotion)    [place] Level (Gait) contact guard  -EL     Patient was able to Ambulate yes  -EL     Distance in Feet  (Gait) 110  -EL               User Key  (r) = Recorded By, (t) = Taken By, (c) = Cosigned By      Initials Name Provider Type    Marshall Christiansen, LASHAY Physical Therapist                   Obj/Interventions       [facility] Name 06/21/25 1535          Range of Motion Comprehensive    General Range of Motion bilateral lower extremity ROM WFL  -[facility] Name 06/21/25 1535          Strength Comprehensive (MMT)    General Manual Muscle Testing (MMT) Assessment no strength deficits identified  -[facility] Name 06/21/25 1535          Sensory Assessment (Somatosensory)    Sensory Assessment (Somatosensory) sensation intact  -EL               User Key  (r) = Recorded By, (t) = Taken By, (c) = Cosigned By      Initials Name Provider Type    Marshall Christiansen, PT Physical Therapist                   Goals/Plan       [facility] Name 06/21/25 1601          Bed Mobility Goal 1 (PT)    Activity/Assistive Device (Bed Mobility Goal 1, PT) bed mobility activities, all  -EL     [place] Level/Cues Needed (Bed Mobility Goal 1, PT) independent  -EL     Time Frame (Bed Mobility Goal 1, PT) long term goal (LTG);2 weeks  -[facility] Name 06/21/25 1601          Transfer Goal 1 (PT)    Activity/Assistive Device (Transfer Goal 1, PT) transfers, all  -EL     [place] Level/Cues Needed (Transfer Goal 1, PT) independent  -EL     Time Frame (Transfer Goal 1, PT) long term goal (LTG);2 weeks  -[facility] Name 06/21/25 1601          Gait Training Goal 1 (PT)    Activity/Assistive Device (Gait Training Goal 1, PT) gait (walking locomotion)  -EL     [place] Level (Gait Training Goal 1, PT) independent  -EL     Distance (Gait Training Goal 1, PT) 250  -EL     Time Frame (Gait Training Goal 1, PT) long term goal (LTG);2 weeks  -       Row Name 06/21/25 1601          Therapy Assessment/Plan (PT)    Planned Therapy Interventions (PT) neuromuscular re-education;balance training;bed mobility training;transfer training;gait training;patient/family  education;strengthening  -EL               User Key  (r) = Recorded By, (t) = Taken By, (c) = Cosigned By      Initials Name Provider Type    Marshall Christiansen, PT Physical Therapist                   Clinical Impression       Row Name 06/21/25 1552          Pain    Pretreatment Pain Rating 0/10 - no pain  -EL     Posttreatment Pain Rating 0/10 - no pain  -EL       Row Name 06/21/25 1552          Plan of Care Review    Plan of Care Reviewed With patient  -EL     Outcome Evaluation Pt is a 69 YO F admitted with c/o dizziness with postural change. MRI (-) for acute change. Pt lives home alone, is independent with ADLs, ambulation without AD and no recent falls. Pt states she has been experiencing this dizziness for approx 1 week, mostly when exiting bed. PT performed Claribel Hallpike to R and L with no symptoms or nystagmus to R side, but noted upbeating torsional nystagmus to L. PT then performed 3 Modified Epley manuevers to treat L posterior canal BPPV, with improvment in symptoms and nystagmus. Pt then ambulated safely with CGA without mild balance deficits. Pt likely safe to return home at d/c. PT to f/u while pt admitted to encourage dunia.  -EL       Row Name 06/21/25 1552          Therapy Assessment/Plan (PT)    Rehab Potential (PT) good  -EL     Criteria for Skilled Interventions Met (PT) yes  -EL     Therapy Frequency (PT) 3 times/wk  -EL     Predicted Duration of Therapy Intervention (PT) Until d/c  -EL       Row Name 06/21/25 1552          Vital Signs    O2 Delivery Pre Treatment room air  -EL     O2 Delivery Intra Treatment room air  -EL     O2 Delivery Post Treatment room air  -EL     Pre Patient Position Supine  -EL     Intra Patient Position Standing  -EL     Post Patient Position Sitting  -EL       Row Name 06/21/25 1552          Positioning and Restraints    Pre-Treatment Position in bed  -EL     Post Treatment Position bed  -EL     In Bed notified nsg;supine;call light within reach;encouraged to call for  assist;exit alarm on  -EL               User Key  (r) = Recorded By, (t) = Taken By, (c) = Cosigned By      Initials Name Provider Type    Marshall Christiansen PT Physical Therapist                   Outcome Measures       Row Name 06/21/25 1603 06/21/25 0800       How much help from another person do you currently need...    Turning from your back to your side while in flat bed without using bedrails? 4  -EL 3  -JH    Moving from lying on back to sitting on the side of a flat bed without bedrails? 4  -EL 3  -JH    Moving to and from a bed to a chair (including a wheelchair)? 4  -EL 3  -JH    Standing up from a chair using your arms (e.g., wheelchair, bedside chair)? 4  -EL 3  -JH    Climbing 3-5 steps with a railing? 3  -EL 3  -JH    To walk in hospital room? 4  -EL 3  -JH    AM-PAC 6 Clicks Score (PT) 23  -EL 18  -JH    Highest Level of Mobility Goal Walk 25 Feet or More-7  -EL Walk 10 Steps or More-6  -JH      Row Name 06/21/25 1603          Functional Assessment    Outcome Measure Options AM-PAC 6 Clicks Basic Mobility (PT)  -EL               User Key  (r) = Recorded By, (t) = Taken By, (c) = Cosigned By      Initials Name Provider Type    Marshall Christiansen PT Physical Therapist    Gilma Dumont, RN Registered Nurse                                 Physical Therapy Education       Title: PT OT SLP Therapies (Done)       Topic: Physical Therapy (Done)       Point: Mobility training (Done)       Learning Progress Summary            Patient Acceptance, E,TB, VU by  at 6/21/2025 1609                      Point: Precautions (Done)       Learning Progress Summary            Patient Acceptance, E,TB, VU by  at 6/21/2025 1609                                      User Key       Initials Effective Dates Name Provider Type [facility] 06/23/20 -  Marshall Jiménez, PT Physical Therapist PT                  PT Recommendation and Plan  Planned Therapy Interventions (PT): neuromuscular re-education, balance training, bed mobility  training, transfer training, gait training, patient/family education, strengthening  Outcome Evaluation: Pt is a 71 YO F admitted with c/o dizziness with postural change. MRI (-) for acute change. Pt lives home alone, is independent with ADLs, ambulation without AD and no recent falls. Pt states she has been experiencing this dizziness for approx 1 week, mostly when exiting bed. PT performed Claribel Hallpike to R and L with no symptoms or nystagmus to R side, but noted upbeating torsional nystagmus to L. PT then performed 3 Modified Epley manuevers to treat L posterior canal BPPV, with improvment in symptoms and nystagmus. Pt then ambulated safely with CGA without mild balance deficits. Pt likely safe to return home at d/c. PT to f/u while pt admitted to [facility] dunia.     Time Calculation:   PT Evaluation Complexity  History, PT Evaluation Complexity: 1-2 personal factors and/or comorbidities  Examination of Body Systems (PT Eval Complexity): total of 3 or more elements  Clinical Presentation (PT Evaluation Complexity): evolving  Clinical Decision Making (PT Evaluation Complexity): moderate complexity  Overall Complexity (PT Evaluation Complexity): moderate complexity     PT Charges       Row Name 06/21/25 1610             Time Calculation    Start Time 0836  -EL      Stop Time 0905  -EL      Time Calculation (min) 29 min  -EL      PT Received On 06/21/25  -EL      PT - Next Appointment 06/23/25  -EL      PT Goal Re-Cert Due Date 07/05/25  -EL                User Key  (r) = Recorded By, (t) = Taken By, (c) = Cosigned By      Initials Name Provider Type    EL Marshall Jiménez PT Physical Therapist                  Therapy Charges for Today       Code Description Service Date Service Provider Modifiers Qty    [id number]  PT EVAL MOD COMPLEXITY 4 6/21/2025 Marshall Jiménez, PT GP 1            PT G-Codes  Outcome Measure Options: AM-PAC 6 Clicks Basic Mobility (PT)  AM-PAC 6 Clicks Score (PT): 23  PT Discharge  Summary  Anticipated Discharge Disposition (PT): home    Marshall Jiménez, PT  6/21/2025

## 2025-06-21 NOTE — PLAN OF CARE
Problem: Adult Inpatient Plan of Care  Goal: Plan of Care Review  Outcome: Progressing  Goal: Patient-Specific Goal (Individualized)  Outcome: Progressing  Goal: Absence of Hospital-Acquired Illness or Injury  Outcome: Progressing  Intervention: Identify and Manage Fall Risk  Intervention: Prevent Infection  Goal: Optimal Comfort and Wellbeing  Outcome: Progressing  Goal: Readiness for Transition of Care  Outcome: Progressing     Problem: Comorbidity Management  Goal: Blood Pressure in Desired Range  Outcome: Progressing  Intervention: Maintain Blood Pressure Management   Goal Outcome Evaluation:              Outcome Evaluation: sched for stress test. tolerated moving about bed. no complaints last night.

## 2025-06-21 NOTE — DISCHARGE SUMMARY
[facility]    Ruben Mejias MD    CHIEF COMPLAINT:     Dizziness and chest tigthness     HISTORY OF PRESENT ILLNESS:    HPI  70-year-old female who states that Tuesday she started getting dizzy she states when she got up out of bed the room started spinning. She got nauseated. She states that it has been kind of coming and going since that time seems to be somewhat related to position when she moves her head around. There is no headache with that there was no visual changes there was no speech difficulty there was no weakness to one-sided the other. Nothing otherwise seem to trigger it other when she got up out of bed. She had a little bit of chest pressure at that time but none since that time. She states has been persistent today since getting up. She denies any neck arm or jaw pain back pain or antibiotic use. No leg pain or swelling no recent long car ride plane ride immobilization or foreign travels or ill exposures.     Past Medical History:   Diagnosis Date    Anxiety     Asthma 04/27/2016    Hyperlipidemia 03/02/2020    Hypertension 03/02/2020    Obesity (BMI 30-39.9) 03/02/2020    Osteopenia 03/02/2020    Sleep apnea     SOBOE (shortness of breath on exertion)      Past Surgical History:   Procedure Laterality Date    CARDIAC CATHETERIZATION N/A 03/04/2020    Procedure: Left Heart Cath and coronary angiogram;  Surgeon: Hien Camacho MD;  Location: [facility] INVASIVE LOCATION;  Service: Cardiovascular;  Laterality: N/A;    CARDIAC CATHETERIZATION N/A 03/04/2020    Procedure: Left ventriculography;  Surgeon: Hien Camacho MD;  Location: [facility] CATH INVASIVE LOCATION;  Service: Cardiovascular;  Laterality: N/A;    CARDIAC CATHETERIZATION N/A 01/30/2023    Procedure: Left Heart Cath and coronary angiogram;  Surgeon: Hien Camacho MD;  Location: [facility] CATH INVASIVE LOCATION;  Service: Cardiovascular;  Laterality: N/A;    CARDIAC CATHETERIZATION N/A 01/30/2023     Procedure: Right Heart Cath;  Surgeon: Hien Camacho MD;  Location: [facility] CATH INVASIVE LOCATION;  Service: Cardiovascular;  Laterality: N/A;    CHOLECYSTECTOMY WITH INTRAOPERATIVE CHOLANGIOGRAM N/A 2/6/2024    Procedure: CHOLECYSTECTOMY LAPAROSCOPIC INTRAOPERATIVE CHOLANGIOGRAM liver biopsy;  Surgeon: Tom Hernandez MD;  Location: [facility] MAIN OR;  Service: General;  Laterality: N/A;    COLONOSCOPY N/A 8/23/2024    Procedure: COLONOSCOPY with POLYPECTOMY X2 (COLD SNARE);  Surgeon: Bismark Cartagena MD;  Location: [facility] ENDOSCOPY;  Service: Gastroenterology;  Laterality: N/A;  POST; COLON POLYPS    ENDOSCOPY N/A 8/23/2024    Procedure: ESOPHAGOGASTRODUODENOSCOPY with BIOPSY;  Surgeon: Bismark Cartagena MD;  Location: [facility] ENDOSCOPY;  Service: Gastroenterology;  Laterality: N/A;    HYSTERECTOMY       Family History   Problem Relation Age of Onset    Hypertension Mother     Heart disease Sister     Heart disease Brother     Colon cancer Maternal Grandmother      Social History     Tobacco Use    Smoking status: Former     Current packs/day: 1.50     Average packs/day: 1.5 packs/day for 32.0 years (48.0 ttl pk-yrs)     Types: Cigarettes     Passive exposure: Past    Smokeless tobacco: Never   Vaping Use    Vaping status: Never Used   Substance Use Topics    Alcohol use: Never    Drug use: Never     Medications Prior to Admission   Medication Sig Dispense Refill Last Dose/Taking    amLODIPine (NORVASC) 5 MG tablet Take 1 tablet by mouth Daily. 30 tablet 0 6/19/2025    aspirin 81 MG EC tablet Take 1 tablet by mouth Daily.   6/19/2025 Morning    calcium carbonate (OS-JEANA) 600 MG tablet Take 1 tablet by mouth Daily.   6/19/2025    celecoxib (CeleBREX) 200 MG capsule Take 1 capsule by mouth Daily.   6/19/2025    Cholecalciferol 50 MCG (2000 UT) capsule Take 2,000 Int'l Units by mouth Daily.   6/19/2025    escitalopram (LEXAPRO) 10 MG tablet Take 1 tablet by mouth Daily.   6/19/2025    ferrous sulfate 325  (65 FE) MG tablet Take 1 tablet by mouth Daily With Breakfast.   6/19/2025    isosorbide mononitrate (IMDUR) 30 MG 24 hr tablet Take 1 tablet by mouth Daily. 30 tablet 0 6/19/2025    losartan (COZAAR) 100 MG tablet Take 1 tablet by mouth Daily. 30 tablet 0 6/19/2025    pantoprazole (PROTONIX) 40 MG EC tablet Take 1 tablet by mouth Every Morning. 30 tablet 0 6/19/2025    Potassium 99 MG tablet Take 1 tablet by mouth 2 (Two) Times a Day.   6/19/2025    pravastatin (PRAVACHOL) 10 MG tablet Take 1 tablet by mouth Daily.   6/19/2025    Probiotic Product (Probiotic Blend) capsule Take 1 capsule by mouth Daily. 90 capsule 3 6/19/2025    rOPINIRole (REQUIP) 0.5 MG tablet Take 2 tablets by mouth Every Night. Take 1 hour before bedtime.   6/19/2025    triamterene-hydrochlorothiazide (MAXZIDE) 75-50 MG per tablet Take 1 tablet by mouth Daily.   6/19/2025    zinc gluconate 50 MG tablet Take 1 tablet by mouth Daily.   6/19/2025    hydrOXYzine (ATARAX) 25 MG tablet Take 1 tablet by mouth At Night As Needed for Itching.   More than a month     Allergies:  Prednisone, Atorvastatin, Lisinopril, and Azithromycin    Immunization History   Administered Date(s) Administered    COVID-19 (PFIZER) Purple Cap Monovalent 06/08/2021, 06/18/2021, 07/09/2021, 07/09/2021, 12/20/2021    Hepatitis A 04/24/2018, 10/24/2018           REVIEW OF SYSTEMS:    Review of Systems   Constitutional: Positive for malaise/fatigue.   HENT: Negative.     Eyes:  Negative for blurred vision.   Cardiovascular:  Positive for chest pain. Negative for dyspnea on exertion.   Respiratory:  Positive for shortness of breath.    Endocrine: Negative.    Skin: Negative.    Musculoskeletal: Negative.    Gastrointestinal: Negative.    Genitourinary: Negative.    Neurological:  Positive for dizziness and light-headedness.   Psychiatric/Behavioral: Negative.         Vital Signs  Temp:  [98.4 °F (36.9 °C)-99.1 °F (37.3 °C)] 98.8 °F (37.1 °C)  Heart Rate:  [56-68] 60  Resp:   [15-23] 17  BP: (129-147)/(47-70) 139/64          Physical Exam:  Physical Exam  Vitals and nursing note reviewed.   Constitutional:       Appearance: Normal appearance.   HENT:      Head: Normocephalic and atraumatic.      Right Ear: External ear normal. A middle ear effusion is present.      Left Ear: External ear normal. A middle ear effusion is present.      Nose: Congestion present.      Mouth/Throat:      Pharynx: Oropharynx is clear.   Eyes:      Extraocular Movements: Extraocular movements intact.      Conjunctiva/sclera: Conjunctivae normal.      Pupils: Pupils are equal, round, and reactive to light.   Cardiovascular:      Rate and Rhythm: Normal rate and regular rhythm.      Pulses: Normal pulses.      Heart sounds: Normal heart sounds.   Pulmonary:      Effort: Pulmonary effort is normal.   Abdominal:      General: Bowel sounds are normal.   Musculoskeletal:         General: Normal range of motion.      Cervical back: Normal range of motion.   Skin:     General: Skin is warm.      Capillary Refill: Capillary refill takes less than 2 seconds.   Neurological:      Mental Status: She is alert and oriented to person, place, and time.   Psychiatric:         Mood and Affect: Mood normal.         Behavior: Behavior normal.         Thought Content: Thought content normal.         Judgment: Judgment normal.         Emotional Behavior:    wnl   Debilities:   none  Results Review:    I reviewed the patient's new clinical results.  Lab Results (most recent)       Procedure Component Value Units Date/Time    Basic Metabolic Panel [[id number]]  (Abnormal) Collected: 06/21/25 0348    Specimen: Blood from Arm, Right Updated: 06/21/25 0509     Glucose 120 mg/dL      BUN 27.2 mg/dL      Creatinine 1.08 mg/dL      Sodium 139 mmol/L      Potassium 4.0 mmol/L      Chloride 101 mmol/L      CO2 26.0 mmol/L      Calcium 9.7 mg/dL      BUN/Creatinine Ratio 25.2     Anion Gap 12.0 mmol/L      eGFR 55.7 mL/min/1.73     Narrative:       GFR Categories in Chronic Kidney Disease (CKD)              GFR Category          GFR (mL/min/1.73)    Interpretation  G1                    90 or greater        Normal or high (1)  G2                    60-89                Mild decrease (1)  G3a                   45-59                Mild to moderate decrease  G3b                   30-44                Moderate to severe decrease  G4                    15-29                Severe decrease  G5                    14 or less           Kidney failure    (1)In the absence of evidence of kidney disease, neither GFR category G1 or G2 fulfill the criteria for CKD.    eGFR calculation 2021 CKD-EPI creatinine equation, which does not include race as a factor    CBC & Differential [[id number]]  (Abnormal) Collected: 06/21/25 0348    Specimen: Blood from Arm, Right Updated: 06/21/25 0433    Narrative:      The following orders were created for panel order CBC & Differential.  Procedure                               Abnormality         Status                     ---------                               -----------         ------                     CBC Auto Differential[[id number]]        Abnormal            Final result                 Please view results for these tests on the individual orders.    CBC Auto Differential [[id number]]  (Abnormal) Collected: 06/21/25 0348    Specimen: Blood from Arm, Right Updated: 06/21/25 0433     WBC 10.15 10*3/mm3      RBC 4.24 10*6/mm3      Hemoglobin 12.3 g/dL      Hematocrit 38.9 %      MCV 91.7 fL      MCH 29.0 pg      MCHC 31.6 g/dL      RDW 13.6 %      RDW-SD 45.5 fl      MPV 9.3 fL      Platelets 304 10*3/mm3      Neutrophil % 58.9 %      Lymphocyte % 32.4 %      Monocyte % 6.1 %      Eosinophil % 2.0 %      Basophil % 0.3 %      Immature Grans % 0.3 %      Neutrophils, Absolute 5.98 10*3/mm3      Lymphocytes, Absolute 3.29 10*3/mm3      Monocytes, Absolute 0.62 10*3/mm3      Eosinophils, Absolute 0.20 10*3/mm3      Basophils,  Absolute 0.03 10*3/mm3      Immature Grans, Absolute 0.03 10*3/mm3      nRBC 0.0 /100 WBC     BNP [[id number]]  (Normal) Collected: 06/20/25 1010    Specimen: Blood from Arm, Left Updated: 06/20/25 1607     proBNP 214.0 pg/mL     Narrative:      This assay is used as an aid in the diagnosis of individuals suspected of having heart failure. It can be used as an aid in the diagnosis of acute decompensated heart failure (ADHF) in patients presenting with signs and symptoms of ADHF to the emergency department (ED). In addition, NT-proBNP of <300 pg/mL indicates ADHF is not likely.    Age Range Result Interpretation  NT-proBNP Concentration (pg/mL:      <50             Positive            >450                   Gray                 300-450                    Negative             <300    50-75           Positive            >900                  Gray                300-900                  Negative            <300      >75             Positive            >1800                  Gray                300-1800                  Negative            <300    Hemoglobin A1c [[id number]]  (Abnormal) Collected: 06/20/25 0839    Specimen: Blood Updated: 06/20/25 1352     Hemoglobin A1C 6.33 %     Narrative:      Hemoglobin A1C Ranges:    Increased Risk for Diabetes  5.7% to 6.4%  Diabetes                     >= 6.5%  Diabetic Goal                < 7.0%    High Sensitivity Troponin T 1Hr [[id number]]  (Normal) Collected: 06/20/25 1010    Specimen: Blood from Arm, Left Updated: 06/20/25 1037     HS Troponin T 13 ng/L      Troponin T Numeric Delta 1 ng/L     Narrative:      High Sensitive Troponin T Reference Range:  <14.0 ng/L- Negative Female for AMI  <22.0 ng/L- Negative Male for AMI  >=14 - Abnormal Female indicating possible myocardial injury.  >=22 - Abnormal Male indicating possible myocardial injury.   Clinicians would have to utilize clinical acumen, EKG, Troponin, and serial changes to determine if it is an Acute Myocardial  Infarction or myocardial injury due to an underlying chronic condition.         High Sensitivity Troponin T [[id number]]  (Normal) Collected: 06/20/25 0839    Specimen: Blood Updated: 06/20/25 0924     HS Troponin T 12 ng/L      Comment: Specimen hemolyzed.  Results may be falsely decreased.       Narrative:      High Sensitive Troponin T Reference Range:  <14.0 ng/L- Negative Female for AMI  <22.0 ng/L- Negative Male for AMI  >=14 - Abnormal Female indicating possible myocardial injury.  >=22 - Abnormal Male indicating possible myocardial injury.   Clinicians would have to utilize clinical acumen, EKG, Troponin, and serial changes to determine if it is an Acute Myocardial Infarction or myocardial injury due to an underlying chronic condition.         Comprehensive Metabolic Panel [[id number]]  (Abnormal) Collected: 06/20/25 0839    Specimen: Blood Updated: 06/20/25 0924     Glucose 150 mg/dL      BUN 23.0 mg/dL      Creatinine 1.02 mg/dL      Sodium 139 mmol/L      Potassium 4.9 mmol/L      Comment: Specimen hemolyzed.  Result may be falsely elevated.        Chloride 102 mmol/L      CO2 23.8 mmol/L      Calcium 10.1 mg/dL      Total Protein 7.1 g/dL      Albumin 4.4 g/dL      ALT (SGPT) 21 U/L      Comment: Specimen hemolyzed.  Result may  be falsely elevated.        AST (SGOT) 32 U/L      Comment: Specimen hemolyzed.  Result may be falsely elevated.        Alkaline Phosphatase 93 U/L      Total Bilirubin 0.5 mg/dL      Globulin 2.7 gm/dL      A/G Ratio 1.6 g/dL      BUN/Creatinine Ratio 22.5     Anion Gap 13.2 mmol/L      eGFR 59.7 mL/min/1.73     Narrative:      GFR Categories in Chronic Kidney Disease (CKD)              GFR Category          GFR (mL/min/1.73)    Interpretation  G1                    90 or greater        Normal or high (1)  G2                    60-89                Mild decrease (1)  G3a                   45-59                Mild to moderate decrease  G3b                   30-44                 Moderate to severe decrease  G4                    15-29                Severe decrease  G5                    14 or less           Kidney failure    (1)In the absence of evidence of kidney disease, neither GFR category G1 or G2 fulfill the criteria for CKD.    eGFR calculation 2021 CKD-EPI creatinine equation, which does not include race as a factor    Magnesium [[id number]]  (Normal) Collected: 06/20/25 0839    Specimen: Blood Updated: 06/20/25 0924     Magnesium 1.8 mg/dL     TSH Rfx On Abnormal To Free T4 [[id number]]  (Normal) Collected: 06/20/25 0839    Specimen: Blood Updated: 06/20/25 0923     TSH 1.710 uIU/mL     Extra Tubes [[id number]] Collected: 06/20/25 0839    Specimen: Blood, Venous Line Updated: 06/20/25 0901    Narrative:      The following orders were created for panel order Extra Tubes.  Procedure                               Abnormality         Status                     ---------                               -----------         ------                     Gold Top - SST[[id number]]                                   Final result               Light Blue Top[[id number]]                                   Final result                 Please view results for these tests on the individual orders.    Gold Top - SST [[id number]] Collected: 06/20/25 0839    Specimen: Blood Updated: 06/20/25 0901     Extra Tube Hold for add-ons.     Comment: Auto resulted.       Light Blue Top [[id number]] Collected: 06/20/25 0839    Specimen: Blood Updated: 06/20/25 0901     Extra Tube Hold for add-ons.     Comment: Auto resulted       CBC & Differential [[id number]]  (Abnormal) Collected: 06/20/25 0839    Specimen: Blood Updated: 06/20/25 0854    Narrative:      The following orders were created for panel order CBC & Differential.  Procedure                               Abnormality         Status                     ---------                               -----------         ------                     CBC Auto  Differential[[id number]]        Abnormal            Final result                 Please view results for these tests on the individual orders.    CBC Auto Differential [[id number]]  (Abnormal) Collected: 06/20/25 0839    Specimen: Blood Updated: 06/20/25 0854     WBC 10.17 10*3/mm3      RBC 4.33 10*6/mm3      Hemoglobin 12.7 g/dL      Hematocrit 39.8 %      MCV 91.9 fL      MCH 29.3 pg      MCHC 31.9 g/dL      RDW 13.4 %      RDW-SD 45.5 fl      MPV 9.4 fL      Platelets 283 10*3/mm3      Neutrophil % 74.5 %      Lymphocyte % 19.0 %      Monocyte % 4.3 %      Eosinophil % 1.6 %      Basophil % 0.2 %      Immature Grans % 0.4 %      Neutrophils, Absolute 7.58 10*3/mm3      Lymphocytes, Absolute 1.93 10*3/mm3      Monocytes, Absolute 0.44 10*3/mm3      Eosinophils, Absolute 0.16 10*3/mm3      Basophils, Absolute 0.02 10*3/mm3      Immature Grans, Absolute 0.04 10*3/mm3      nRBC 0.0 /100 WBC     Carbon Monoxide, Blood [[id number]]  (Normal) Collected: 06/20/25 0839    Specimen: Blood Updated: 06/20/25 0852     Carbon Monoxide, Blood 2.2 %             Imaging Results (Most Recent)       Procedure Component Value Units Date/Time    MRI Brain Without Contrast [[id number]] Collected: 06/20/25 0939     Updated: 06/20/25 0946    Narrative:      MRI BRAIN WO CONTRAST    Date of Exam: 6/20/2025 9:16 AM EDT    Indication: Severe dizziness.     Comparison: None available.    Technique:  Routine multiplanar/multisequence sequence images of the brain were obtained without contrast administration.      Findings:  There is motion limitation.    Diffusion weighted imaging demonstrates no acute restriction abnormality. Midline structures of the brain, pituitary and sella structures are grossly unremarkable. Ventricles, cisterns and sulci appear appropriate for age. Mild patchy FLAIR and T2 signal   hyperintensity within the brain parenchyma is most compatible sequela small vessel ischemic disease in this age group. Limited imaging the  major intracranial flow voids appear grossly unremarkable in appearance. Cerebral pontine angle structures and   inner ear structures are grossly unremarkable. Mild mastoid effusions noted bilaterally. Paranasal sinuses demonstrate mild mucosal thickening. Globes and orbits demonstrate no acute abnormality.      Impression:      Impression:    1. No acute ischemic change    2. Mild white matter changes compatible small vessel ischemic disease in this age.    3. Mild bilateral mastoid effusions        Electronically Signed: Darren Wright MD    6/20/2025 9:44 AM EDT    Workstation ID: [id number]    XR Chest 1 View [[id number]] Collected: 06/20/25 0901     Updated: 06/20/25 0904    Narrative:      XR CHEST 1 VW    Date of Exam: 6/20/2025 9:00 AM EDT    Indication: Dizziness Short of breath chest pain    Comparison: 11/25/2024    Findings:  Mild elevation right hemidiaphragm. The lungs are clear bilaterally. Pleural spaces are normal. Cardiac and mediastinal contours are within normal limits. Regional skeleton is within normal limits for age.      Impression:      Impression:  No acute cardiopulmonary disease.        Electronically Signed: Anthony Alston MD    6/20/2025 9:02 AM EDT    Workstation ID: [id number]          reviewed    ECG/EMG Results (most recent)       Procedure Component Value Units Date/Time    Telemetry Scan [[id number]] Resulted: 06/20/25 1326     Updated: 06/20/25 1349    Telemetry Scan [[id number]] Resulted: 06/20/25 1523     Updated: 06/20/25 1551    Telemetry Scan [[id number]] Resulted: 06/20/25 2029     Updated: 06/20/25 2050    Telemetry Scan [[id number]] Resulted: 06/21/25 0032     Updated: 06/21/25 0051    Telemetry Scan [[id number]] Resulted: 06/21/25 0501     Updated: 06/21/25 0538    Telemetry Scan [[id number]] Resulted: 06/21/25 0732     Updated: 06/21/25 0751    ECG 12 Lead Dyspnea [[id number]] Collected: 06/20/25 0817     Updated: 06/21/25 0916     QT Interval 404 ms      QTC Interval 390 ms      Narrative:      HEART RATE=56  bpm  RR Ysgozdrx=9437  ms  NJ Dftgtzgu=316  ms  P Horizontal Axis=67  deg  P Front Axis=Invalid  deg  QRSD Interval=86  ms  QT Zobbhfal=372  ms  UWeN=961  ms  QRS Axis=4  deg  T Wave Axis=15  deg  - OTHERWISE NORMAL ECG -  Sinus bradycardia  Low voltage, precordial leads  When compared with ECG of 25-Nov-2024 13:12:24,  Nonspecific significant change  Electronically Signed By: Garcia Tom (ERASMO) 2025-06-21 09:16:21  Date and Time of Study:2025-06-20 08:17:20    Telemetry Scan [[id number]] Resulted: 06/21/25 1305     Updated: 06/21/25 1317    Telemetry Scan [[id number]] Resulted: 06/21/25 1503     Updated: 06/21/25 1518          reviewed    Results for orders placed during the hospital encounter of 06/22/23    Doppler Ankle Brachial Index Single Level CAR    Interpretation Summary    Right Conclusion: The right JALEN is normal. Normal digital pressures.    Left Conclusion: The left JALEN is normal. Normal digital pressures.      Results for orders placed during the hospital encounter of 11/07/23    Adult Transthoracic Echo Complete W/ Cont if Necessary Per Protocol    Interpretation Summary    Left ventricular systolic function is normal. Calculated left ventricular EF = 59% Left ventricular ejection fraction appears to be 56 - 60%.    Left ventricular diastolic function is consistent with (grade I) impaired relaxation.    Estimated right ventricular systolic pressure from tricuspid regurgitation is normal (<35 mmHg).    No significant valvular abnormalities.      Microbiology Results (last 10 days)       ** No results found for the last 240 hours. **            Assessment & Plan     Severe vertigo     Vertigo  -Troponin 12 and 13  -  -A1C 6.33  -Thyroid panel wnl   -Carbon monoxide wnl  -CXR: no acute cardiopulmonary findings   -MRI brain: no acute changes, mild small vessel ischemic disease, mild bilateral mastoid effusions  -Add Zyrtec and Mucinex, Flonase outpatient   -EKG: sinus  bradycardia, HR 56  - Hold Celebrex  - Stress test ordered   - PT consulted     MELANI  Lab Results   Component Value Date    CREATININE 1.08 (H) 06/21/2025    BUN 27.2 (H) 06/21/2025    BCR 25.2 (H) 06/21/2025    EGFR 55.7 (L) 06/21/2025   -Hold HCTZ   -Avoid nephrotoxic medication IV dye unless urgently needed  -Monitor BMP and I's and O's while admitted      Hypertension  BP Readings from Last 1 Encounters:   06/21/25 139/64   - Continue Losarstan, Imdur, Amlodipine, ASA  - Monitor while admitted    GERD  -Continue PPI     RLS  -Continue Requip    AGNIESZKA  -Continue Lexapro     Hyperlipidemia  -Continue statin     I discussed the patients findings and my recommendations with patient.     Discharge Diagnosis:      Severe vertigo      Hospital Course  Patient is a 70 y.o. female presented with dizziness and chest tightness.   Patient reported chest tightness and dizziness the past couple of days.  Patient had troponin of 12 and 13 and BNP of 214.  Patient has Lecky MELANI BUN 27 creatinine 1.08.  Hydrochlorothiazide held.  EKG shows sinus bradycardia heart rate 56.  Chest x-ray showed no acute cardiopulmonary disease.  White count within normal limits.  Patient did have stress test which showed no ischemia. Patient worked with physical therapy that did show patient was positive for vertigo. Patient also had MRI which showed no acute findings with mild bilateral mastoid effusions. Patient advised to follow-up with PCP and PT vestibular outpatient. Patient to monitor blood pressures daily. Tests and recommendations reviewed with patient. If symptoms worsen, patient to call 911 or go to nearest ED.     Past Medical History:     Past Medical History:   Diagnosis Date    Anxiety     Asthma 04/27/2016    Hyperlipidemia 03/02/2020    Hypertension 03/02/2020    Obesity (BMI 30-39.9) 03/02/2020    Osteopenia 03/02/2020    Sleep apnea     SOBOE (shortness of breath on exertion)        Past Surgical History:     Past Surgical History:    Procedure Laterality Date    CARDIAC CATHETERIZATION N/A 03/04/2020    Procedure: Left Heart Cath and coronary angiogram;  Surgeon: Hien Camacho MD;  Location: [facility] CATH INVASIVE LOCATION;  Service: Cardiovascular;  Laterality: N/A;    CARDIAC CATHETERIZATION N/A 03/04/2020    Procedure: Left ventriculography;  Surgeon: Hien Camacho MD;  Location: [facility] CATH INVASIVE LOCATION;  Service: Cardiovascular;  Laterality: N/A;    CARDIAC CATHETERIZATION N/A 01/30/2023    Procedure: Left Heart Cath and coronary angiogram;  Surgeon: Hien Camacho MD;  Location: [facility] CATH INVASIVE LOCATION;  Service: Cardiovascular;  Laterality: N/A;    CARDIAC CATHETERIZATION N/A 01/30/2023    Procedure: Right Heart Cath;  Surgeon: Hien Camacho MD;  Location: [facility] CATH INVASIVE LOCATION;  Service: Cardiovascular;  Laterality: N/A;    CHOLECYSTECTOMY WITH INTRAOPERATIVE CHOLANGIOGRAM N/A 2/6/2024    Procedure: CHOLECYSTECTOMY LAPAROSCOPIC INTRAOPERATIVE CHOLANGIOGRAM liver biopsy;  Surgeon: Tom Hernandez MD;  Location: [facility] MAIN OR;  Service: General;  Laterality: N/A;    COLONOSCOPY N/A 8/23/2024    Procedure: COLONOSCOPY with POLYPECTOMY X2 (COLD SNARE);  Surgeon: Bismark Cartagena MD;  Location: [facility] ENDOSCOPY;  Service: Gastroenterology;  Laterality: N/A;  POST; COLON POLYPS    ENDOSCOPY N/A 8/23/2024    Procedure: ESOPHAGOGASTRODUODENOSCOPY with BIOPSY;  Surgeon: Bismark Cartagena MD;  Location: [facility] ENDOSCOPY;  Service: Gastroenterology;  Laterality: N/A;    HYSTERECTOMY         Social History:   Social History     Socioeconomic History    Marital status:    Tobacco Use    Smoking status: Former     Current packs/day: 1.50     Average packs/day: 1.5 packs/day for 32.0 years (48.0 ttl pk-yrs)     Types: Cigarettes     Passive exposure: Past    Smokeless tobacco: Never   Vaping Use    Vaping status: Never Used   Substance and Sexual Activity    Alcohol use: Never    Drug use: Never     Sexual activity: Defer       Procedures Performed         Consults:   Consults       No orders found for last 30 day(s).            Condition on Discharge:     Stable    Discharge Disposition  Home or Self Care    Discharge Medications     Discharge Medications        New Medications        Instructions Start Date   cetirizine 10 MG tablet  Commonly known as: zyrTEC   10 mg, Oral, Daily   Start Date: June 22, 2025     ondansetron 4 MG tablet  Commonly known as: Zofran   4 mg, Oral, Every 8 Hours PRN             Continue These Medications        Instructions Start Date   amLODIPine 5 MG tablet  Commonly known as: NORVASC   5 mg, Oral, Every 24 Hours Scheduled      aspirin 81 MG EC tablet   81 mg, Daily      calcium carbonate 600 MG tablet  Commonly known as: OS-JEANA   600 mg, Daily      celecoxib 200 MG capsule  Commonly known as: CeleBREX   200 mg, Daily      Cholecalciferol 50 MCG (2000 UT) capsule   2,000 Int'l Units, Daily      escitalopram 10 MG tablet  Commonly known as: LEXAPRO   10 mg, Daily      ferrous sulfate 325 (65 FE) MG tablet   325 mg, Daily With Breakfast      hydrOXYzine 25 MG tablet  Commonly known as: ATARAX   25 mg, Nightly PRN      isosorbide mononitrate 30 MG 24 hr tablet  Commonly known as: IMDUR   30 mg, Oral, Every 24 Hours Scheduled      losartan 100 MG tablet  Commonly known as: COZAAR   100 mg, Oral, Every 24 Hours Scheduled      pantoprazole 40 MG EC tablet  Commonly known as: PROTONIX   40 mg, Oral, Every Early Morning      Potassium 99 MG tablet   1 tablet, 2 Times Daily      pravastatin 10 MG tablet  Commonly known as: PRAVACHOL   10 mg, Daily      Probiotic Blend capsule   1 capsule, Oral, Daily      rOPINIRole 0.5 MG tablet  Commonly known as: REQUIP   1 mg, Nightly      triamterene-hydrochlorothiazide 75-50 MG per tablet  Commonly known as: MAXZIDE   1 tablet, Daily      zinc gluconate 50 MG tablet   50 mg, Daily               Discharge Diet:   Diet Instructions       Diet: Cardiac  Diets; Low Sodium (2g); Regular (IDDSI 7); Thin (IDDSI 0)      Discharge Diet: Cardiac Diets    Cardiac Diet: Low Sodium (2g)    Texture: Regular (IDDSI 7)    Fluid Consistency: Thin (IDDSI 0)            Activity at Discharge:   Activity Instructions       Activity as Tolerated      Measure Blood Pressure              Follow-up Appointments  Future Appointments   Date Time Provider Department Center   7/18/2025  7:30 AM [facility]   1/12/2026  1:45 PM Angel Arnold MD [facility]     Additional Instructions for the Follow-ups that You Need to Schedule       Ambulatory Referral to Physical Therapy   As directed      Specialty needed: Vestibular   Follow-up needed: Yes        Discharge Follow-up with PCP   As directed       Currently Documented PCP:    Ruben Mejias MD    PCP Phone Number:    [phone]     Follow Up Details: 7-10 days                Test Results Pending at Discharge  Pending Results       None             Risk for Readmission (LACE) Score: 3 (6/21/2025  6:01 AM)          ANGELINA Hernandez  06/21/25  15:52 EDT      I spent 35 minutes caring for Scarlet on this date of service. This time includes time spent by me in the following activities: reviewing tests, performing a medically appropriate examination and/or evaluation, counseling and educating the patient/family/caregiver, referring and communicating with other health care professionals, documenting information in the medical record, independently interpreting results and communicating that information with the patient/family/caregiver, care coordination, ordering medications, ordering test(s), ordering procedure(s), obtaining a separately obtained history, and reviewing a separately obtained history.

## 2025-06-21 NOTE — PLAN OF CARE
Goal Outcome Evaluation:  Plan of Care Reviewed With: patient           Outcome Evaluation: Pt is a 69 YO F admitted with c/o dizziness with postural change. MRI (-) for acute change. Pt lives home alone, is independent with ADLs, ambulation without AD and no recent falls. Pt states she has been experiencing this dizziness for approx 1 week, mostly when exiting bed. PT performed Claribel Hallpike to R and L with no symptoms or nystagmus to R side, but noted upbeating torsional nystagmus to L. PT then performed 3 Modified Epley manuevers to treat L posterior canal BPPV, with improvment in symptoms and nystagmus. Pt then ambulated safely with CGA without mild balance deficits. Pt likely safe to return home at d/c. PT to f/u while pt admitted to [facility].    Anticipated Discharge Disposition (PT): home

## 2025-06-21 NOTE — PLAN OF CARE
Problem: Adult Inpatient Plan of Care  Goal: Plan of Care Review  Outcome: Progressing  Flowsheets  Taken 6/21/2025 1452 by Gilma Grant RN  Progress: no change  Plan of Care Reviewed With: patient  Taken 6/21/2025 0546 by Geraldine Monaco RN  Outcome Evaluation: sched for stress test. tolerated moving about bed. no complaints last night.  Goal: Patient-Specific Goal (Individualized)  Outcome: Progressing  Goal: Absence of Hospital-Acquired Illness or Injury  Outcome: Progressing  Intervention: Identify and Manage Fall Risk  Recent Flowsheet Documentation  Taken 6/21/2025 1400 by Gilma Grant RN  Safety Promotion/Fall Prevention:   room organization consistent   safety round/check completed  Taken 6/21/2025 1200 by Gilma Grant RN  Safety Promotion/Fall Prevention:   room organization consistent   safety round/check completed  Taken 6/21/2025 1000 by Gilma Grant RN  Safety Promotion/Fall Prevention:   room organization consistent   safety round/check completed  Taken 6/21/2025 0800 by Gilma Grant RN  Safety Promotion/Fall Prevention:   safety round/check completed   room organization consistent  Intervention: Prevent Skin Injury  Recent Flowsheet Documentation  Taken 6/21/2025 1200 by Gilma Grant RN  Body Position: position changed independently  Taken 6/21/2025 0800 by Gilma Grant RN  Body Position: position changed independently  Intervention: Prevent and Manage VTE (Venous Thromboembolism) Risk  Recent Flowsheet Documentation  Taken 6/21/2025 0800 by Gilma Grant RN  VTE Prevention/Management: SCDs (sequential compression devices) off  Intervention: Prevent Infection  Recent Flowsheet Documentation  Taken 6/21/2025 0800 by Gilma Grant RN  Infection Prevention:   hand hygiene promoted   rest/sleep promoted  Goal: Optimal Comfort and Wellbeing  Outcome: Progressing  Goal: Readiness for Transition of Care  Outcome: Progressing     Problem: Comorbidity Management  Goal: Blood  Pressure in Desired Range  Outcome: Progressing   Goal Outcome Evaluation:  Plan of Care Reviewed With: patient        Progress: no change

## 2025-06-21 NOTE — PLAN OF CARE
Problem: Adult Inpatient Plan of Care  Goal: Plan of Care Review  6/21/2025 1659 by Gilma Grant RN  Outcome: Met  Flowsheets (Taken 6/21/2025 1659)  Outcome Evaluation: discharged home  6/21/2025 1452 by Gilma Grant RN  Outcome: Progressing  Flowsheets  Taken 6/21/2025 1452 by Gilma Grant RN  Progress: no change  Plan of Care Reviewed With: patient  Taken 6/21/2025 0546 by Geraldine Monaco RN  Outcome Evaluation: sched for stress test. tolerated moving about bed. no complaints last night.  Goal: Patient-Specific Goal (Individualized)  6/21/2025 1659 by Gilma Grant RN  Outcome: Met  6/21/2025 1452 by Gilma Grant RN  Outcome: Progressing  Goal: Absence of Hospital-Acquired Illness or Injury  6/21/2025 1659 by Gilma Grant RN  Outcome: Met  6/21/2025 1452 by Gilma Grant RN  Outcome: Progressing  Intervention: Identify and Manage Fall Risk  Recent Flowsheet Documentation  Taken 6/21/2025 1400 by Gilma Grant RN  Safety Promotion/Fall Prevention:   room organization consistent   safety round/check completed  Taken 6/21/2025 1200 by Gilma Grant RN  Safety Promotion/Fall Prevention:   room organization consistent   safety round/check completed  Taken 6/21/2025 1000 by Gilma Grant RN  Safety Promotion/Fall Prevention:   room organization consistent   safety round/check completed  Taken 6/21/2025 0800 by Gilma Grant RN  Safety Promotion/Fall Prevention:   safety round/check completed   room organization consistent  Intervention: Prevent Skin Injury  Recent Flowsheet Documentation  Taken 6/21/2025 1200 by Gilma Grant RN  Body Position: position changed independently  Taken 6/21/2025 0800 by Gilma Grant RN  Body Position: position changed independently  Intervention: Prevent and Manage VTE (Venous Thromboembolism) Risk  Recent Flowsheet Documentation  Taken 6/21/2025 0800 by Gilma Grant RN  VTE Prevention/Management: SCDs (sequential compression devices)  off  Intervention: Prevent Infection  Recent Flowsheet Documentation  Taken 6/21/2025 0800 by Gilma Grant, RN  Infection Prevention:   hand hygiene promoted   rest/sleep promoted  Goal: Optimal Comfort and Wellbeing  6/21/2025 1659 by Gilma Grant, RN  Outcome: Met  6/21/2025 1452 by Gilma Grant, RN  Outcome: Progressing  Goal: Readiness for Transition of Care  6/21/2025 1659 by Gilma Grant, RN  Outcome: Met  6/21/2025 1452 by Gilma Grant, RN  Outcome: Progressing     Problem: Comorbidity Management  Goal: Blood Pressure in Desired Range  6/21/2025 1659 by Gilma Grant, RN  Outcome: Met  6/21/2025 1452 by Gilma Grant, RN  Outcome: Progressing   Goal Outcome Evaluation:  Plan of Care Reviewed With: patient        Progress: no change  Outcome Evaluation: discharged home

## 2025-06-23 NOTE — CASE MANAGEMENT/SOCIAL WORK
Case Management Discharge Note      Final Note: Home         Selected Continued Care - Discharged on 6/21/2025 Admission date: 6/20/2025 - Discharge disposition: Home or Self Care       Transportation Services  Private: Car    Final Discharge Disposition Code: 01 - home or self-care

## 2025-06-26 ENCOUNTER — TRANSCRIBE ORDERS (OUTPATIENT)
Dept: ADMINISTRATIVE | Facility: HOSPITAL | Age: 70
End: 2025-06-26
Payer: MEDICARE

## 2025-06-26 DIAGNOSIS — M54.42 LOW BACK PAIN WITH LEFT-SIDED SCIATICA, UNSPECIFIED BACK PAIN LATERALITY, UNSPECIFIED CHRONICITY: Primary | ICD-10-CM

## 2025-07-18 ENCOUNTER — HOSPITAL ENCOUNTER (OUTPATIENT)
Dept: MAMMOGRAPHY | Facility: HOSPITAL | Age: 70
Discharge: HOME | End: 2025-07-18
Admitting: FAMILY MEDICINE
Payer: MEDICARE

## 2025-07-18 DIAGNOSIS — Z12.31 BREAST CANCER SCREENING BY MAMMOGRAM: ICD-10-CM

## 2025-07-18 PROCEDURE — 77067 SCR MAMMO BI INCL CAD: CPT

## 2025-07-18 PROCEDURE — 77063 BREAST TOMOSYNTHESIS BI: CPT

## 2025-07-25 ENCOUNTER — HOSPITAL ENCOUNTER (OUTPATIENT)
Dept: MRI IMAGING | Facility: HOSPITAL | Age: 70
Discharge: HOME | End: 2025-07-25
Payer: MEDICARE

## 2025-07-25 DIAGNOSIS — M54.42 LOW BACK PAIN WITH LEFT-SIDED SCIATICA, UNSPECIFIED BACK PAIN LATERALITY, UNSPECIFIED CHRONICITY: ICD-10-CM

## 2025-07-25 PROCEDURE — 72148 MRI LUMBAR SPINE W/O DYE: CPT

## (undated) DEVICE — CVR SURG STND MAYO 24X53IN STRL

## (undated) DEVICE — TROC BLADLES XCEL/OPTI SLV THRD 12X100

## (undated) DEVICE — BG RETRV TISS SUPERBAG INTRO RIP/STOP NLY 10MM 240ML MD

## (undated) DEVICE — SCISS ENDOPATH CRV 5MM

## (undated) DEVICE — CATH DIAG IMPULSE PIG 5F 100CM

## (undated) DEVICE — CATH DIAG IMPULSE FL4 5F 100CM

## (undated) DEVICE — SNAR POLYP CAPTIVATOR OVL 13MM 240CM

## (undated) DEVICE — GW DIAG EMERALD HEPCOAT MOVE JTIP STD .035 3MM 150CM

## (undated) DEVICE — BITEBLOCK ENDO W/STRAP 60F A/ LF DISP

## (undated) DEVICE — PINNACLE INTRODUCER SHEATH: Brand: PINNACLE

## (undated) DEVICE — ADHS SKIN PREMIERPRO EXOFIN TOPICAL HI/VISC .5ML

## (undated) DEVICE — SUT VIC 0 UR6 27IN VCP603H

## (undated) DEVICE — CATH DIAG IMPULSE FR4 5F 100CM

## (undated) DEVICE — NDL BIOP W/DEPTH MARK 14G 4 1/4IN

## (undated) DEVICE — PK TRY HEART CATH 50

## (undated) DEVICE — PK ENDO GI 50

## (undated) DEVICE — SOL IRR H2O BTL 1000ML STRL

## (undated) DEVICE — SUT PDS 0 ENDOLP 18IN DYED EZ10G

## (undated) DEVICE — SXN/IRR STRYKEFLOW2 W/TIPS 5 32CM

## (undated) DEVICE — DRSNG TELFA PAD NONADH STR 1S 3X8IN

## (undated) DEVICE — GW FC J TFE/COAT .025 3MM 180CM

## (undated) DEVICE — UNDERGLV SURG BIOGEL INDICATOR LTX PF 7

## (undated) DEVICE — CVR HNDL LT SURG ACCSSRY BLU STRL

## (undated) DEVICE — SUT VIC 4/0 PS2 18IN VCP496H

## (undated) DEVICE — SYR LUERLOK 30CC

## (undated) DEVICE — PASS SUT PRO BARIATRIC XL W/TROC SWABS

## (undated) DEVICE — SLV SCD CALF HEMOFORCE DVT THERP REPROC MD

## (undated) DEVICE — ST TBG INSUFL ENDOFLATOR50 HEAT W/GAS/FLTR 1P/U

## (undated) DEVICE — TRAP WIDEEYE POLYP

## (undated) DEVICE — TROC BLADLES XCEL/OPTIVW SLV THRD 5X100

## (undated) DEVICE — PK GENERAL LAPAROSCOPY 50

## (undated) DEVICE — SINGLE-USE BIOPSY FORCEPS: Brand: RADIAL JAW 4

## (undated) DEVICE — RADIFOCUS OBTURATOR: Brand: RADIFOCUS

## (undated) DEVICE — BLANKT WARM UPPR/BDY ARM/OUT 57X196CM

## (undated) DEVICE — SWAN-GANZ TRUE SIZE THERMODILUTION "S" TIP: Brand: SWAN-GANZ TRUE SIZE

## (undated) DEVICE — Device

## (undated) DEVICE — KT SURG TURNOVER 050

## (undated) DEVICE — GLV SURG BIOGEL LTX PF 7